# Patient Record
Sex: MALE | Race: WHITE | NOT HISPANIC OR LATINO | Employment: FULL TIME | URBAN - METROPOLITAN AREA
[De-identification: names, ages, dates, MRNs, and addresses within clinical notes are randomized per-mention and may not be internally consistent; named-entity substitution may affect disease eponyms.]

---

## 2017-02-10 ENCOUNTER — OFFICE VISIT (OUTPATIENT)
Dept: URGENT CARE | Facility: URGENT CARE | Age: 37
End: 2017-02-10
Payer: COMMERCIAL

## 2017-02-10 ENCOUNTER — RADIANT APPOINTMENT (OUTPATIENT)
Dept: GENERAL RADIOLOGY | Facility: CLINIC | Age: 37
End: 2017-02-10
Attending: NURSE PRACTITIONER
Payer: COMMERCIAL

## 2017-02-10 VITALS
HEART RATE: 99 BPM | DIASTOLIC BLOOD PRESSURE: 76 MMHG | OXYGEN SATURATION: 98 % | RESPIRATION RATE: 22 BRPM | SYSTOLIC BLOOD PRESSURE: 144 MMHG | TEMPERATURE: 100.4 F

## 2017-02-10 DIAGNOSIS — R50.9 FEVER, UNSPECIFIED: ICD-10-CM

## 2017-02-10 DIAGNOSIS — J01.90 ACUTE SINUSITIS WITH COEXISTING CONDITION REQUIRING PROPHYLACTIC TREATMENT: Primary | ICD-10-CM

## 2017-02-10 DIAGNOSIS — J20.9 ACUTE BRONCHITIS WITH COEXISTING CONDITION REQUIRING PROPHYLACTIC TREATMENT: ICD-10-CM

## 2017-02-10 LAB
FLUAV+FLUBV AG SPEC QL: NORMAL
FLUAV+FLUBV AG SPEC QL: NORMAL
SPECIMEN SOURCE: NORMAL

## 2017-02-10 PROCEDURE — 87804 INFLUENZA ASSAY W/OPTIC: CPT | Performed by: NURSE PRACTITIONER

## 2017-02-10 PROCEDURE — 99214 OFFICE O/P EST MOD 30 MIN: CPT | Performed by: NURSE PRACTITIONER

## 2017-02-10 PROCEDURE — 71020 XR CHEST 2 VW: CPT

## 2017-02-10 RX ORDER — ALBUTEROL SULFATE 90 UG/1
2 AEROSOL, METERED RESPIRATORY (INHALATION) EVERY 6 HOURS PRN
Qty: 1 INHALER | Refills: 0 | Status: SHIPPED | OUTPATIENT
Start: 2017-02-10 | End: 2017-03-31

## 2017-02-10 RX ORDER — FLUTICASONE PROPIONATE 50 MCG
1-2 SPRAY, SUSPENSION (ML) NASAL DAILY
Qty: 16 G | Refills: 0 | Status: SHIPPED | OUTPATIENT
Start: 2017-02-10 | End: 2017-03-31

## 2017-02-10 RX ORDER — PREDNISONE 20 MG/1
20 TABLET ORAL 2 TIMES DAILY
Qty: 10 TABLET | Refills: 0 | Status: SHIPPED | OUTPATIENT
Start: 2017-02-10 | End: 2017-03-31

## 2017-02-10 NOTE — MR AVS SNAPSHOT
After Visit Summary   2/10/2017    Ashish Blas    MRN: [id number]           Patient Information     Date Of Birth          1980        Visit Information        Provider Department      2/10/2017 5:50 PM Michelle Lyn APRN CNP [facility]        Today's Diagnoses     Fever, unspecified    -  1     Acute sinusitis with coexisting condition requiring prophylactic treatment         Acute bronchitis with coexisting condition requiring prophylactic treatment           Care Instructions    No Antibiotic are warranted at this  time.  It is important if your symptoms worsen or not improved in a total of 7-10 days from the onset of symptoms it is important to return and be re-evaluated.    Symptom Relief: Afdrin do not use greater then 3 days  Intranasal corticosteroid   Flonase has  been prescribed.     Tylenol or Ibuprofen if able to take for fevers and discomfort.  Do not exceed 4 grams of Tylenol in a 24 hour period.  Take Ibuprofen with food.      Follow up in 3-5 days if not improving or return sooner if worsening or fail to improve as anticipated.  Follow-up with your primary care provider next week and as needed.    Indications for emergent return to emergency department discussed with patient, who verbalized good understanding and agreement.  Patient understands the limitations of today's evaluation.         Bronchitis, Antibiotic Treatment (Adult)    Bronchitis is an infection of the air passages (bronchial tubes) in your lungs. It often occurs when you have a cold. This illness is contagious during the first few days and is spread through the air by coughing and sneezing, or by direct contact (touching the sick person and then touching your own eyes, nose, or mouth).  Symptoms of bronchitis include cough with mucus (phlegm) and low-grade fever. Bronchitis usually lasts 7 to 14 days. Mild cases can be treated with simple home remedies. More severe infection is  treated with an antibiotic.  Home care  Follow these guidelines when caring for yourself at home:    If your symptoms are severe, rest at home for the first 2 to 3 days. When you go back to your usual activities, don't let yourself get too tired.    Do not smoke. Also avoid being exposed to secondhand smoke.    You may use over-the-counter medicines to control fever or pain, unless another medicine was prescribed. (Note: If you have chronic liver or kidney disease or have ever had a stomach ulcer or gastrointestinal bleeding, talk with your healthcare provider before using these medicines. Also talk to your provider if you are taking medicine to prevent blood clots.) Aspirin should never be given to anyone younger than 18 years of age who is ill with a viral infection or fever. It may cause severe liver or brain damage.    Your appetite may be poor, so a light diet is fine. Avoid dehydration by drinking 6 to 8 glasses of fluids per day (such as water, soft drinks, sports drinks, juices, tea, or soup). Extra fluids will help loosen secretions in the nose and lungs.    Over-the-counter cough, cold, and sore-throat medicines will not shorten the length of the illness, but they may be helpful to reduce symptoms. (Note: Do not use decongestants if you have high blood pressure.)    Finish all antibiotic medicine. Do this even if you are feeling better after only a few days.  Follow-up care  Follow up with your healthcare provider, or as advised. If you had an X-ray or ECG (electrocardiogram), a specialist will review it. You will be notified of any new findings that may affect your care.  Note: If you are age 65 or older, or if you have a chronic lung disease or condition that affects your immune system, or you smoke, talk to your healthcare provider about having pneumococcal vaccinations and a yearly influenza vaccination (flu shot).  When to seek medical advice  Call your healthcare provider right away if any of these  occur:    Fever of 100.4 F (38 C) or higher    Coughing up increased amounts of colored sputum    Weakness, drowsiness, headache, facial pain, ear pain, or a stiff neck   Call 911, or get immediate medical care  Contact emergency services right away if any of these occur.    Coughing up blood    Worsening weakness, drowsiness, headache, or stiff neck    Trouble breathing, wheezing, or pain with breathing    3488-5354 [employer]. [address] [place] [address]. All rights reserved. This information is not intended as a substitute for professional medical care. Always follow your healthcare professional's instructions.        Sinusitis (Antibiotic Treatment)    The sinuses are air-filled spaces within the bones of the face. They connect to the inside of the nose. Sinusitis is an inflammation of the tissue lining the sinus cavity. Sinus inflammation can occur during a cold. It can also be due to allergies to pollens and other particles in the air. Sinusitis can cause symptoms of sinus congestion and fullness. A sinus infection causes fever, headache and facial pain. There is often green or yellow drainage from the nose or into the back of the throat (post-nasal drip). You have been given antibiotics to treat this condition.  Home care:    Take the full course of antibiotics as instructed. Do not stop taking them, even if you feel better.    Drink plenty of water, hot tea, and other liquids. This may help thin mucus. It also may promote sinus drainage.    Heat may help soothe painful areas of the face. Use a towel soaked in hot water. Or,  the shower and direct the hot spray onto your face. Using a vaporizer along with a menthol rub at night may also help.     An expectorant containing guaifenesin may help thin the mucus and promote drainage from the sinuses.    Over-the-counter decongestants may be used unless a similar medicine was prescribed. Nasal sprays work the fastest. Use one  that contains phenylephrine or oxymetazoline. First blow the nose gently. Then use the spray. Do not use these medicines more often than directed on the label or symptoms may get worse. You may also use tablets containing pseudoephedrine. Avoid products that combine ingredients, because side effects may be increased. Read labels. You can also ask the pharmacist for help. (NOTE: Persons with high blood pressure should not use decongestants. They can raise blood pressure.)    Over-the-counter antihistamines may help if allergies contributed to your sinusitis.      Do not use nasal rinses or irrigation during an acute sinus infection, unless told to by your health care provider. Rinsing may spread the infection to other sinuses.    Use acetaminophen or ibuprofen to control pain, unless another pain medicine was prescribed. (If you have chronic liver or kidney disease or ever had a stomach ulcer, talk with your doctor before using these medicines. Aspirin should never be used in anyone under 18 years of age who is ill with a fever. It may cause severe liver damage.)    Don't smoke. This can worsen symptoms.  Follow-up care  Follow up with your healthcare provider or our staff if you are not improving within the next week.  When to seek medical advice  Call your healthcare provider if any of these occur:    Facial pain or headache becoming more severe    Stiff neck    Unusual drowsiness or confusion    Swelling of the forehead or eyelids    Vision problems, including blurred or double vision    Fever of 100.4 F (38 C) or higher, or as directed by your healthcare provider    Seizure    Breathing problems    Symptoms not resolving within 10 days    4763-8131 [employer]. [address] [place] [address], [place] [address]. All rights reserved. This information is not intended as a substitute for professional medical care. Always follow your healthcare professional's instructions.        Influenza  Influenza ( the flu )  is an infection that affects your respiratory tract (the mouth, nose, and lungs, and the passages between them). Unlike a cold, the flu can make you very ill. And it can lead to pneumonia, a serious lung infection. For some people, especially older adults, young children, and people with certain chronic conditions, the flu can have serious complications and even be fatal.  What Are the Risk Factors for the Flu?     Viruses that cause influenza spread through the air in droplets when someone who has the flu coughs, sneezes, laughs, or talks.   Anyone can get the flu. But you re more likely to become infected if you:    Have a weakened immune system.    Work in a health care setting where you may be exposed to flu germs.    Live or work with someone who has the flu.    Haven t received an annual flu shot.  How Does the Flu Spread?  The flu is caused by viruses. The viruses spread through the air in droplets when someone who has the flu coughs, sneezes, laughs, or talks. You can become infected when you inhale these viruses directly. You can also become infected when you touch a surface on which the droplets have landed and then transfer the germs to your eyes, nose, or mouth. Touching used tissues, or sharing utensils, drinking glasses, or a toothbrush with an infected person can expose you to flu viruses, too.  What Are the Symptoms of the Flu?  Flu symptoms tend to come on quickly and may last a few days to a few weeks. They include:    Fever usually higher than 101 F  (38.3 C) and chills    Sore throat and headache    Dry cough    Runny nose    Tiredness and weakness    Muscle aches  Factors That Can Make Flu Worse  For some people, the flu can be very serious. The risk of complications is greater for:    Children under age 5.    Adults 65 years of age and older.    People with a chronic illness, such as diabetes or heart, kidney, or lung disease.    People who live in a nursing home or long-term care facility.    How Is the Flu Treated?  Influenza usually improves after 7 days or so. In some cases, your health care provider may prescribe an antiviral medication. This may help you get well sooner. For the medication to help, you need to take it as soon as possible (ideally within 48 hours) after your symptoms start. If you develop pneumonia or other serious illness, hospital care may be needed.  Easing Flu Symptoms    Drink lots of fluids such as water, juice, and warm soup. A good rule is to drink enough so that you urinate your normal amount.    Get plenty of rest.    Ask your health care provider what to take for fever and pain.    Call your provider if your fever rises over 101 F (38.3 C) or you become dizzy, lightheaded, or short of breath.  Taking Steps to Protect Others    Wash your hands often, especially after coughing or sneezing. Or, clean your hands with an alcohol-based hand  containing at least 60 percent alcohol.    Cough or sneeze into a tissue. Then throw the tissue away and wash your hands. If you don t have a tissue, cough and sneeze into the crook of your elbow.    Stay home until at least 24 hours after you no longer have a fever or chills. Be sure the fever isn t being hidden by fever-reducing medication.    Don t share food, utensils, drinking glasses, or a toothbrush with others.    Ask your health care provider if others in your household should receive antiviral medication to help them avoid infection.  How Can the Flu Be Prevented?    One of the best ways to avoid the flu is to get a flu vaccination each year. Viruses that cause the flu change from year to year. For that reason, doctors recommend getting the flu vaccine each year, as soon as it's available in your area. The vaccine may be given as a shot or as a nasal spray. Your health care provider can tell you which vaccine is right for you.    Wash your hands often. Frequent handwashing is a proven way to help prevent  infection.    Carry an alcohol-based hand gel containing at least 60 percent alcohol. Use it when you don t have access to soap and water. Then wash your hands as soon as you can.    Avoid touching your eyes, nose, and mouth.    At home and work, clean phones, computer keyboards, and toys often with disinfectant wipes.    If possible, avoid close contact with others who have the flu or symptoms of the flu.  Handwashing Tips  Handwashing is one of the best ways to prevent many common infections. If you re caring for or visiting someone with the flu, wash your hands each time you enter and leave the room. Follow these steps:    Use warm water and plenty of soap. Rub your hands together well.    Clean the whole hand, under your nails, between your fingers, and up the wrists.    Wash for at least 15 seconds.    Rinse, letting the water run down your fingers, not up your wrists.    Dry your hands well. Use a paper towel to turn off the faucet and open the door.  Using Alcohol-Based Hand   Alcohol-based hand  are also a good choice. Use them when you don t have access to soap and water. Follow these steps:    Squeeze about a tablespoon of gel into the palm of one hand.    Rub your hands together briskly, cleaning the backs of your hands, the palms, between your fingers, and up the wrists.    Rub until the gel is gone and your hands are completely dry.  Preventing Influenza in Healthcare Settings  The flu is a special concern for people in hospitals and long-term care facilities. To help prevent the spread of flu, many hospitals and nursing homes take these steps:    Health care providers wash their hands or use an alcohol-based hand  before and after treating each patient.    People with the flu have private rooms and bathrooms or share a room with someone with the same infection.    High-risk patients who don t have the flu are encouraged to get the flu and pneumonia vaccines.    All health care  workers are encouraged or required to get flu shots.        4104-3300 [employer]. [address] [place] [address], [place] [address]. All rights reserved. This information is not intended as a substitute for professional medical care. Always follow your healthcare professional's instructions.              Follow-ups after your visit        Who to contact     If you have questions or need follow up information about today's clinic visit or your schedule please contact [facility] directly at [phone].  Normal or non-critical lab and imaging results will be communicated to you by [employer]UM[employer], letter or phone within 4 business days after the clinic has received the results. If you do not hear from us within 7 days, please contact the clinic through [employer] or phone. If you have a critical or abnormal lab result, we will notify you by phone as soon as possible.  Submit refill requests through [employer] or call your pharmacy and they will forward the refill request to us. Please allow 3 business days for your refill to be completed.          Additional Information About Your Visit        ZIM[employer]UM[employer] Information     [employer] gives you secure access to your electronic health record. If you see a primary care provider, you can also send messages to your care team and make appointments. If you have questions, please call your primary care clinic.  If you do not have a primary care provider, please call [phone] and they will assist you.        Care EveryWhere ID     This is your Care EveryWhere ID. This could be used by other organizations to access your [place] medical records  [id number]        Your Vitals Were     Pulse Temperature Respirations Pulse Oximetry          99 100.4  F (38  C) (Tympanic) 22 98%         Blood Pressure from Last 3 Encounters:   02/10/17 144/76   11/08/16 128/74   08/23/16 130/85    Weight from Last 3 Encounters:   11/08/16 214 lb (97.07 kg)   08/23/16 204 lb  (92.534 kg)   06/03/16 204 lb 9.6 oz (92.806 kg)              We Performed the Following     Influenza A/B antigen          Today's Medication Changes          These changes are accurate as of: 2/10/17  6:50 PM.  If you have any questions, ask your nurse or doctor.               Start taking these medicines.        Dose/Directions    albuterol 108 (90 BASE) MCG/ACT Inhaler   Commonly known as:  PROAIR HFA/PROVENTIL HFA/VENTOLIN HFA   Used for:  Acute bronchitis with coexisting condition requiring prophylactic treatment   Started by:  Michelle Lyn APRN CNP        Dose:  2 puff   Inhale 2 puffs into the lungs every 6 hours as needed for shortness of breath / dyspnea or wheezing   Quantity:  1 Inhaler   Refills:  0       amoxicillin-clavulanate 875-125 MG per tablet   Commonly known as:  AUGMENTIN   Used for:  Acute sinusitis with coexisting condition requiring prophylactic treatment, Acute bronchitis with coexisting condition requiring prophylactic treatment   Started by:  Michelle Lyn APRN CNP        Dose:  1 tablet   Take 1 tablet by mouth 2 times daily   Quantity:  20 tablet   Refills:  0       predniSONE 20 MG tablet   Commonly known as:  DELTASONE   Used for:  Acute bronchitis with coexisting condition requiring prophylactic treatment   Started by:  Michelle Lyn APRN CNP        Dose:  20 mg   Take 1 tablet (20 mg) by mouth 2 times daily   Quantity:  10 tablet   Refills:  0         These medicines have changed or have updated prescriptions.        Dose/Directions    * fluticasone 50 MCG/ACT spray   Commonly known as:  FLONASE   This may have changed:  Another medication with the same name was added. Make sure you understand how and when to take each.   Used for:  Seasonal allergic rhinitis, unspecified allergic rhinitis trigger   Changed by:  Arlin Perez APRN CNP        Dose:  1-2 spray   Spray 1-2 sprays into both nostrils daily   Quantity:  1 Bottle   Refills:  11       * fluticasone 50  MCG/ACT spray   Commonly known as:  FLONASE   This may have changed:  You were already taking a medication with the same name, and this prescription was added. Make sure you understand how and when to take each.   Used for:  Acute sinusitis with coexisting condition requiring prophylactic treatment   Changed by:  Michelle Lyn APRN CNP        Dose:  1-2 spray   Spray 1-2 sprays into both nostrils daily   Quantity:  16 g   Refills:  0       * Notice:  This list has 2 medication(s) that are the same as other medications prescribed for you. Read the directions carefully, and ask your doctor or other care provider to review them with you.         Where to get your medicines      These medications were sent to [id number] - [place], [place] - [address]  [address] [address]    Hours:  Closed 10-16-08 business to [facility] Phone:  [phone]    - albuterol 108 (90 BASE) MCG/ACT Inhaler  - amoxicillin-clavulanate 875-125 MG per tablet  - fluticasone 50 MCG/ACT spray  - predniSONE 20 MG tablet             Primary Care Provider Office Phone # Fax #    Sauravmiguel Jennifer Pack [phone]       [address] [address]        Thank you!     Thank you for choosing [facility]  for your care. Our goal is always to provide you with excellent care. Hearing back from our patients is one way we can continue to improve our services. Please take a few minutes to complete the written survey that you may receive in the mail after your visit with us. Thank you!             Your Updated Medication List - Protect others around you: Learn how to safely use, store and throw away your medicines at www.disposemymeds.org.          This list is accurate as of: 2/10/17  6:50 PM.  Always use your most recent med list.                   Brand Name Dispense Instructions for use    albuterol 108 (90 BASE)  MCG/ACT Inhaler    PROAIR HFA/PROVENTIL HFA/VENTOLIN HFA    1 Inhaler    Inhale 2 puffs into the lungs every 6 hours as needed for shortness of breath / dyspnea or wheezing       ALPRAZolam 0.25 MG tablet    XANAX    20 tablet    Take 1 tablet (0.25 mg) by mouth 3 times daily as needed for anxiety       amoxicillin-clavulanate 875-125 MG per tablet    AUGMENTIN    20 tablet    Take 1 tablet by mouth 2 times daily       citalopram 40 MG tablet    celeXA    90 tablet    Take 1 tablet (40 mg) by mouth daily To start 1/2 tab daily x 6 days then full tab daily.       * fluticasone 50 MCG/ACT spray    FLONASE    1 Bottle    Spray 1-2 sprays into both nostrils daily       * fluticasone 50 MCG/ACT spray    FLONASE    16 g    Spray 1-2 sprays into both nostrils daily       Multi-vitamin Tabs tablet   Generic drug:  multivitamin, therapeutic with minerals      1 TABLET ORALLY DAILY       predniSONE 20 MG tablet    DELTASONE    10 tablet    Take 1 tablet (20 mg) by mouth 2 times daily       TYLENOL PO          VITAMIN B COMPLEX PO      1 TABLET ORALLY DAILY       ZOLOFT PO      Take by mouth daily       * Notice:  This list has 2 medication(s) that are the same as other medications prescribed for you. Read the directions carefully, and ask your doctor or other care provider to review them with you.

## 2017-02-11 NOTE — PROGRESS NOTES
SUBJECTIVE:  Ashish Blas is a 36 year old male who presents to the clinic today with a chief complaint of cough  for 2 day(s).  His cough is described as nonproductive.    The patient's symptoms are moderate and worsening.  Associated symptoms include congestion and fever. The patient's symptoms are exacerbated by no particular triggers  Patient has been using nothing  to improve symptoms.    History reviewed. No pertinent past medical history.    Social History   Substance Use Topics     Smoking status: Former Smoker -- 10 years     Types: Cigarettes     Start date: 07/19/2016     Smokeless tobacco: Never Used      Comment: Quit date is 5/30/2016     Alcohol Use: Yes      Comment: occ         ROS  CONSTITUTIONAL:POSITIVE  for fever   INTEGUMENTARY/SKIN: NEGATIVE for worrisome rashes, moles or lesions  EYES: NEGATIVE for vision changes or irritation  ENT/MOUTH: POSITIVE for sinus pressure  RESP:POSITIVE for cough-non productive  CV: NEGATIVE for chest pain, palpitations or peripheral edema  MUSCULOSKELETAL: NEGATIVE for significant arthralgias or myalgia  NEURO: NEGATIVE for weakness, dizziness or paresthesias    OBJECTIVE:  /76 mmHg  Pulse 99  Temp(Src) 100.4  F (38  C) (Tympanic)  Resp 22  SpO2 98%  GENERAL APPEARANCE: healthy, alert and no distress  EYES: EOMI,  PERRL, conjunctiva clear  HENT: ear canals and TM's normal.  mouth without ulcers, erythema or lesions  HENT: nasal turbinates erythematous, swollen  NECK: supple, nontender, no lymphadenopathy  RESP: lungs clear to auscultation - no rales, rhonchi or wheezes  CV: regular rates and rhythm, normal S1 S2, no murmur noted  ABDOMEN:  soft, nontender, no HSM or masses and bowel sounds normal  NEURO: Normal strength and tone, sensory exam grossly normal,  normal speech and mentation  SKIN: no suspicious lesions or rashes    X-RAY:  CHEST TWO VIEWS  2/10/2017 6:49 PM       HISTORY: Fever.     COMPARISON:  4/26/2016                                                                       IMPRESSION: Normal. No change.  ASSESSMENT:      ICD-10-CM    1. Acute sinusitis with coexisting condition requiring prophylactic treatment J01.90 fluticasone (FLONASE) 50 MCG/ACT spray     amoxicillin-clavulanate (AUGMENTIN) 875-125 MG per tablet   2. Acute bronchitis with coexisting condition requiring prophylactic treatment J20.9 predniSONE (DELTASONE) 20 MG tablet     albuterol (PROAIR HFA/PROVENTIL HFA/VENTOLIN HFA) 108 (90 BASE) MCG/ACT Inhaler     amoxicillin-clavulanate (AUGMENTIN) 875-125 MG per tablet   3. Fever, unspecified R50.9 Influenza A/B antigen     XR Chest 2 Views         PLAN:  Patient Instructions     No Antibiotic are warranted at this  time.  It is important if your symptoms worsen or not improved in a total of 7-10 days from the onset of symptoms it is important to return and be re-evaluated.    Symptom Relief: Afdrin do not use greater then 3 days  Intranasal corticosteroid   Flonase has  been prescribed.     Tylenol or Ibuprofen if able to take for fevers and discomfort.  Do not exceed 4 grams of Tylenol in a 24 hour period.  Take Ibuprofen with food.      Follow up in 3-5 days if not improving or return sooner if worsening or fail to improve as anticipated.  Follow-up with your primary care provider next week and as needed.    Indications for emergent return to emergency department discussed with patient, who verbalized good understanding and agreement.  Patient understands the limitations of today's evaluation.         Bronchitis, Antibiotic Treatment (Adult)    Bronchitis is an infection of the air passages (bronchial tubes) in your lungs. It often occurs when you have a cold. This illness is contagious during the first few days and is spread through the air by coughing and sneezing, or by direct contact (touching the sick person and then touching your own eyes, nose, or mouth).  Symptoms of bronchitis  include cough with mucus (phlegm) and low-grade fever. Bronchitis usually lasts 7 to 14 days. Mild cases can be treated with simple home remedies. More severe infection is treated with an antibiotic.  Home care  Follow these guidelines when caring for yourself at home:    If your symptoms are severe, rest at home for the first 2 to 3 days. When you go back to your usual activities, don't let yourself get too tired.    Do not smoke. Also avoid being exposed to secondhand smoke.    You may use over-the-counter medicines to control fever or pain, unless another medicine was prescribed. (Note: If you have chronic liver or kidney disease or have ever had a stomach ulcer or gastrointestinal bleeding, talk with your healthcare provider before using these medicines. Also talk to your provider if you are taking medicine to prevent blood clots.) Aspirin should never be given to anyone younger than 18 years of age who is ill with a viral infection or fever. It may cause severe liver or brain damage.    Your appetite may be poor, so a light diet is fine. Avoid dehydration by drinking 6 to 8 glasses of fluids per day (such as water, soft drinks, sports drinks, juices, tea, or soup). Extra fluids will help loosen secretions in the nose and lungs.    Over-the-counter cough, cold, and sore-throat medicines will not shorten the length of the illness, but they may be helpful to reduce symptoms. (Note: Do not use decongestants if you have high blood pressure.)    Finish all antibiotic medicine. Do this even if you are feeling better after only a few days.  Follow-up care  Follow up with your healthcare provider, or as advised. If you had an X-ray or ECG (electrocardiogram), a specialist will review it. You will be notified of any new findings that may affect your care.  Note: If you are age 65 or older, or if you have a chronic lung disease or condition that affects your immune system, or you smoke, talk to your healthcare provider  about having pneumococcal vaccinations and a yearly influenza vaccination (flu shot).  When to seek medical advice  Call your healthcare provider right away if any of these occur:    Fever of 100.4 F (38 C) or higher    Coughing up increased amounts of colored sputum    Weakness, drowsiness, headache, facial pain, ear pain, or a stiff neck   Call 911, or get immediate medical care  Contact emergency services right away if any of these occur.    Coughing up blood    Worsening weakness, drowsiness, headache, or stiff neck    Trouble breathing, wheezing, or pain with breathing    5863-4417 [employer]. [address] [place] [address]. All rights reserved. This information is not intended as a substitute for professional medical care. Always follow your healthcare professional's instructions.        Sinusitis (Antibiotic Treatment)    The sinuses are air-filled spaces within the bones of the face. They connect to the inside of the nose. Sinusitis is an inflammation of the tissue lining the sinus cavity. Sinus inflammation can occur during a cold. It can also be due to allergies to pollens and other particles in the air. Sinusitis can cause symptoms of sinus congestion and fullness. A sinus infection causes fever, headache and facial pain. There is often green or yellow drainage from the nose or into the back of the throat (post-nasal drip). You have been given antibiotics to treat this condition.  Home care:    Take the full course of antibiotics as instructed. Do not stop taking them, even if you feel better.    Drink plenty of water, hot tea, and other liquids. This may help thin mucus. It also may promote sinus drainage.    Heat may help soothe painful areas of the face. Use a towel soaked in hot water. Or,  the shower and direct the hot spray onto your face. Using a vaporizer along with a menthol rub at night may also help.     An expectorant containing guaifenesin may help thin the  mucus and promote drainage from the sinuses.    Over-the-counter decongestants may be used unless a similar medicine was prescribed. Nasal sprays work the fastest. Use one that contains phenylephrine or oxymetazoline. First blow the nose gently. Then use the spray. Do not use these medicines more often than directed on the label or symptoms may get worse. You may also use tablets containing pseudoephedrine. Avoid products that combine ingredients, because side effects may be increased. Read labels. You can also ask the pharmacist for help. (NOTE: Persons with high blood pressure should not use decongestants. They can raise blood pressure.)    Over-the-counter antihistamines may help if allergies contributed to your sinusitis.      Do not use nasal rinses or irrigation during an acute sinus infection, unless told to by your health care provider. Rinsing may spread the infection to other sinuses.    Use acetaminophen or ibuprofen to control pain, unless another pain medicine was prescribed. (If you have chronic liver or kidney disease or ever had a stomach ulcer, talk with your doctor before using these medicines. Aspirin should never be used in anyone under 18 years of age who is ill with a fever. It may cause severe liver damage.)    Don't smoke. This can worsen symptoms.  Follow-up care  Follow up with your healthcare provider or our staff if you are not improving within the next week.  When to seek medical advice  Call your healthcare provider if any of these occur:    Facial pain or headache becoming more severe    Stiff neck    Unusual drowsiness or confusion    Swelling of the forehead or eyelids    Vision problems, including blurred or double vision    Fever of 100.4 F (38 C) or higher, or as directed by your healthcare provider    Seizure    Breathing problems    Symptoms not resolving within 10 days    [id number] [employer]. [address] [place] [address], [place] [address]. All rights reserved. This  information is not intended as a substitute for professional medical care. Always follow your healthcare professional's instructions.        Influenza  Influenza ( the flu ) is an infection that affects your respiratory tract (the mouth, nose, and lungs, and the passages between them). Unlike a cold, the flu can make you very ill. And it can lead to pneumonia, a serious lung infection. For some people, especially older adults, young children, and people with certain chronic conditions, the flu can have serious complications and even be fatal.  What Are the Risk Factors for the Flu?     Viruses that cause influenza spread through the air in droplets when someone who has the flu coughs, sneezes, laughs, or talks.   Anyone can get the flu. But you re more likely to become infected if you:    Have a weakened immune system.    Work in a health care setting where you may be exposed to flu germs.    Live or work with someone who has the flu.    Haven t received an annual flu shot.  How Does the Flu Spread?  The flu is caused by viruses. The viruses spread through the air in droplets when someone who has the flu coughs, sneezes, laughs, or talks. You can become infected when you inhale these viruses directly. You can also become infected when you touch a surface on which the droplets have landed and then transfer the germs to your eyes, nose, or mouth. Touching used tissues, or sharing utensils, drinking glasses, or a toothbrush with an infected person can expose you to flu viruses, too.  What Are the Symptoms of the Flu?  Flu symptoms tend to come on quickly and may last a few days to a few weeks. They include:    Fever usually higher than 101 F  (38.3 C) and chills    Sore throat and headache    Dry cough    Runny nose    Tiredness and weakness    Muscle aches  Factors That Can Make Flu Worse  For some people, the flu can be very serious. The risk of complications is greater for:    Children under age 5.    Adults 65  years of age and older.    People with a chronic illness, such as diabetes or heart, kidney, or lung disease.    People who live in a nursing home or long-term care facility.   How Is the Flu Treated?  Influenza usually improves after 7 days or so. In some cases, your health care provider may prescribe an antiviral medication. This may help you get well sooner. For the medication to help, you need to take it as soon as possible (ideally within 48 hours) after your symptoms start. If you develop pneumonia or other serious illness, hospital care may be needed.  Easing Flu Symptoms    Drink lots of fluids such as water, juice, and warm soup. A good rule is to drink enough so that you urinate your normal amount.    Get plenty of rest.    Ask your health care provider what to take for fever and pain.    Call your provider if your fever rises over 101 F (38.3 C) or you become dizzy, lightheaded, or short of breath.  Taking Steps to Protect Others    Wash your hands often, especially after coughing or sneezing. Or, clean your hands with an alcohol-based hand  containing at least 60 percent alcohol.    Cough or sneeze into a tissue. Then throw the tissue away and wash your hands. If you don t have a tissue, cough and sneeze into the crook of your elbow.    Stay home until at least 24 hours after you no longer have a fever or chills. Be sure the fever isn t being hidden by fever-reducing medication.    Don t share food, utensils, drinking glasses, or a toothbrush with others.    Ask your health care provider if others in your household should receive antiviral medication to help them avoid infection.  How Can the Flu Be Prevented?    One of the best ways to avoid the flu is to get a flu vaccination each year. Viruses that cause the flu change from year to year. For that reason, doctors recommend getting the flu vaccine each year, as soon as it's available in your area. The vaccine may be given as a shot or as a nasal  spray. Your health care provider can tell you which vaccine is right for you.    Wash your hands often. Frequent handwashing is a proven way to help prevent infection.    Carry an alcohol-based hand gel containing at least 60 percent alcohol. Use it when you don t have access to soap and water. Then wash your hands as soon as you can.    Avoid touching your eyes, nose, and mouth.    At home and work, clean phones, computer keyboards, and toys often with disinfectant wipes.    If possible, avoid close contact with others who have the flu or symptoms of the flu.  Handwashing Tips  Handwashing is one of the best ways to prevent many common infections. If you re caring for or visiting someone with the flu, wash your hands each time you enter and leave the room. Follow these steps:    Use warm water and plenty of soap. Rub your hands together well.    Clean the whole hand, under your nails, between your fingers, and up the wrists.    Wash for at least 15 seconds.    Rinse, letting the water run down your fingers, not up your wrists.    Dry your hands well. Use a paper towel to turn off the faucet and open the door.  Using Alcohol-Based Hand   Alcohol-based hand  are also a good choice. Use them when you don t have access to soap and water. Follow these steps:    Squeeze about a tablespoon of gel into the palm of one hand.    Rub your hands together briskly, cleaning the backs of your hands, the palms, between your fingers, and up the wrists.    Rub until the gel is gone and your hands are completely dry.  Preventing Influenza in Healthcare Settings  The flu is a special concern for people in hospitals and long-term care facilities. To help prevent the spread of flu, many hospitals and nursing homes take these steps:    Health care providers wash their hands or use an alcohol-based hand  before and after treating each patient.    People with the flu have private rooms and bathrooms or share a room  with someone with the same infection.    High-risk patients who don t have the flu are encouraged to get the flu and pneumonia vaccines.    All health care workers are encouraged or required to get flu shots.        2930-1244 [employer]. [address] [place] [address], [place] [address]. All rights reserved. This information is not intended as a substitute for professional medical care. Always follow your healthcare professional's instructions.            MARTHA Almaraz CNP

## 2017-02-11 NOTE — PATIENT INSTRUCTIONS
No Antibiotic are warranted at this  time.  It is important if your symptoms worsen or not improved in a total of 7-10 days from the onset of symptoms it is important to return and be re-evaluated.    Symptom Relief: Afdrin do not use greater then 3 days  Intranasal corticosteroid   Flonase has  been prescribed.     Tylenol or Ibuprofen if able to take for fevers and discomfort.  Do not exceed 4 grams of Tylenol in a 24 hour period.  Take Ibuprofen with food.      Follow up in 3-5 days if not improving or return sooner if worsening or fail to improve as anticipated.  Follow-up with your primary care provider next week and as needed.    Indications for emergent return to emergency department discussed with patient, who verbalized good understanding and agreement.  Patient understands the limitations of today's evaluation.         Bronchitis, Antibiotic Treatment (Adult)    Bronchitis is an infection of the air passages (bronchial tubes) in your lungs. It often occurs when you have a cold. This illness is contagious during the first few days and is spread through the air by coughing and sneezing, or by direct contact (touching the sick person and then touching your own eyes, nose, or mouth).  Symptoms of bronchitis include cough with mucus (phlegm) and low-grade fever. Bronchitis usually lasts 7 to 14 days. Mild cases can be treated with simple home remedies. More severe infection is treated with an antibiotic.  Home care  Follow these guidelines when caring for yourself at home:    If your symptoms are severe, rest at home for the first 2 to 3 days. When you go back to your usual activities, don't let yourself get too tired.    Do not smoke. Also avoid being exposed to secondhand smoke.    You may use over-the-counter medicines to control fever or pain, unless another medicine was prescribed. (Note: If you have chronic liver or kidney disease or have ever had a stomach ulcer or gastrointestinal bleeding, talk with  your healthcare provider before using these medicines. Also talk to your provider if you are taking medicine to prevent blood clots.) Aspirin should never be given to anyone younger than 18 years of age who is ill with a viral infection or fever. It may cause severe liver or brain damage.    Your appetite may be poor, so a light diet is fine. Avoid dehydration by drinking 6 to 8 glasses of fluids per day (such as water, soft drinks, sports drinks, juices, tea, or soup). Extra fluids will help loosen secretions in the nose and lungs.    Over-the-counter cough, cold, and sore-throat medicines will not shorten the length of the illness, but they may be helpful to reduce symptoms. (Note: Do not use decongestants if you have high blood pressure.)    Finish all antibiotic medicine. Do this even if you are feeling better after only a few days.  Follow-up care  Follow up with your healthcare provider, or as advised. If you had an X-ray or ECG (electrocardiogram), a specialist will review it. You will be notified of any new findings that may affect your care.  Note: If you are age 65 or older, or if you have a chronic lung disease or condition that affects your immune system, or you smoke, talk to your healthcare provider about having pneumococcal vaccinations and a yearly influenza vaccination (flu shot).  When to seek medical advice  Call your healthcare provider right away if any of these occur:    Fever of 100.4 F (38 C) or higher    Coughing up increased amounts of colored sputum    Weakness, drowsiness, headache, facial pain, ear pain, or a stiff neck   Call 911, or get immediate medical care  Contact emergency services right away if any of these occur.    Coughing up blood    Worsening weakness, drowsiness, headache, or stiff neck    Trouble breathing, wheezing, or pain with breathing    9262-1435 [employer]. [address] [place] [address], [place] [address]. All rights reserved. This information is not  intended as a substitute for professional medical care. Always follow your healthcare professional's instructions.        Sinusitis (Antibiotic Treatment)    The sinuses are air-filled spaces within the bones of the face. They connect to the inside of the nose. Sinusitis is an inflammation of the tissue lining the sinus cavity. Sinus inflammation can occur during a cold. It can also be due to allergies to pollens and other particles in the air. Sinusitis can cause symptoms of sinus congestion and fullness. A sinus infection causes fever, headache and facial pain. There is often green or yellow drainage from the nose or into the back of the throat (post-nasal drip). You have been given antibiotics to treat this condition.  Home care:    Take the full course of antibiotics as instructed. Do not stop taking them, even if you feel better.    Drink plenty of water, hot tea, and other liquids. This may help thin mucus. It also may promote sinus drainage.    Heat may help soothe painful areas of the face. Use a towel soaked in hot water. Or,  the shower and direct the hot spray onto your face. Using a vaporizer along with a menthol rub at night may also help.     An expectorant containing guaifenesin may help thin the mucus and promote drainage from the sinuses.    Over-the-counter decongestants may be used unless a similar medicine was prescribed. Nasal sprays work the fastest. Use one that contains phenylephrine or oxymetazoline. First blow the nose gently. Then use the spray. Do not use these medicines more often than directed on the label or symptoms may get worse. You may also use tablets containing pseudoephedrine. Avoid products that combine ingredients, because side effects may be increased. Read labels. You can also ask the pharmacist for help. (NOTE: Persons with high blood pressure should not use decongestants. They can raise blood pressure.)    Over-the-counter antihistamines may help if allergies  contributed to your sinusitis.      Do not use nasal rinses or irrigation during an acute sinus infection, unless told to by your health care provider. Rinsing may spread the infection to other sinuses.    Use acetaminophen or ibuprofen to control pain, unless another pain medicine was prescribed. (If you have chronic liver or kidney disease or ever had a stomach ulcer, talk with your doctor before using these medicines. Aspirin should never be used in anyone under 18 years of age who is ill with a fever. It may cause severe liver damage.)    Don't smoke. This can worsen symptoms.  Follow-up care  Follow up with your healthcare provider or our staff if you are not improving within the next week.  When to seek medical advice  Call your healthcare provider if any of these occur:    Facial pain or headache becoming more severe    Stiff neck    Unusual drowsiness or confusion    Swelling of the forehead or eyelids    Vision problems, including blurred or double vision    Fever of 100.4 F (38 C) or higher, or as directed by your healthcare provider    Seizure    Breathing problems    Symptoms not resolving within 10 days    3992-2318 [employer]. [address] [place] [address]. All rights reserved. This information is not intended as a substitute for professional medical care. Always follow your healthcare professional's instructions.        Influenza  Influenza ( the flu ) is an infection that affects your respiratory tract (the mouth, nose, and lungs, and the passages between them). Unlike a cold, the flu can make you very ill. And it can lead to pneumonia, a serious lung infection. For some people, especially older adults, young children, and people with certain chronic conditions, the flu can have serious complications and even be fatal.  What Are the Risk Factors for the Flu?     Viruses that cause influenza spread through the air in droplets when someone who has the flu coughs, sneezes,  laughs, or talks.   Anyone can get the flu. But you re more likely to become infected if you:    Have a weakened immune system.    Work in a health care setting where you may be exposed to flu germs.    Live or work with someone who has the flu.    Haven t received an annual flu shot.  How Does the Flu Spread?  The flu is caused by viruses. The viruses spread through the air in droplets when someone who has the flu coughs, sneezes, laughs, or talks. You can become infected when you inhale these viruses directly. You can also become infected when you touch a surface on which the droplets have landed and then transfer the germs to your eyes, nose, or mouth. Touching used tissues, or sharing utensils, drinking glasses, or a toothbrush with an infected person can expose you to flu viruses, too.  What Are the Symptoms of the Flu?  Flu symptoms tend to come on quickly and may last a few days to a few weeks. They include:    Fever usually higher than 101 F  (38.3 C) and chills    Sore throat and headache    Dry cough    Runny nose    Tiredness and weakness    Muscle aches  Factors That Can Make Flu Worse  For some people, the flu can be very serious. The risk of complications is greater for:    Children under age 5.    Adults 65 years of age and older.    People with a chronic illness, such as diabetes or heart, kidney, or lung disease.    People who live in a nursing home or long-term care facility.   How Is the Flu Treated?  Influenza usually improves after 7 days or so. In some cases, your health care provider may prescribe an antiviral medication. This may help you get well sooner. For the medication to help, you need to take it as soon as possible (ideally within 48 hours) after your symptoms start. If you develop pneumonia or other serious illness, hospital care may be needed.  Easing Flu Symptoms    Drink lots of fluids such as water, juice, and warm soup. A good rule is to drink enough so that you urinate your  normal amount.    Get plenty of rest.    Ask your health care provider what to take for fever and pain.    Call your provider if your fever rises over 101 F (38.3 C) or you become dizzy, lightheaded, or short of breath.  Taking Steps to Protect Others    Wash your hands often, especially after coughing or sneezing. Or, clean your hands with an alcohol-based hand  containing at least 60 percent alcohol.    Cough or sneeze into a tissue. Then throw the tissue away and wash your hands. If you don t have a tissue, cough and sneeze into the crook of your elbow.    Stay home until at least 24 hours after you no longer have a fever or chills. Be sure the fever isn t being hidden by fever-reducing medication.    Don t share food, utensils, drinking glasses, or a toothbrush with others.    Ask your health care provider if others in your household should receive antiviral medication to help them avoid infection.  How Can the Flu Be Prevented?    One of the best ways to avoid the flu is to get a flu vaccination each year. Viruses that cause the flu change from year to year. For that reason, doctors recommend getting the flu vaccine each year, as soon as it's available in your area. The vaccine may be given as a shot or as a nasal spray. Your health care provider can tell you which vaccine is right for you.    Wash your hands often. Frequent handwashing is a proven way to help prevent infection.    Carry an alcohol-based hand gel containing at least 60 percent alcohol. Use it when you don t have access to soap and water. Then wash your hands as soon as you can.    Avoid touching your eyes, nose, and mouth.    At home and work, clean phones, computer keyboards, and toys often with disinfectant wipes.    If possible, avoid close contact with others who have the flu or symptoms of the flu.  Handwashing Tips  Handwashing is one of the best ways to prevent many common infections. If you re caring for or visiting someone with  the flu, wash your hands each time you enter and leave the room. Follow these steps:    Use warm water and plenty of soap. Rub your hands together well.    Clean the whole hand, under your nails, between your fingers, and up the wrists.    Wash for at least 15 seconds.    Rinse, letting the water run down your fingers, not up your wrists.    Dry your hands well. Use a paper towel to turn off the faucet and open the door.  Using Alcohol-Based Hand   Alcohol-based hand  are also a good choice. Use them when you don t have access to soap and water. Follow these steps:    Squeeze about a tablespoon of gel into the palm of one hand.    Rub your hands together briskly, cleaning the backs of your hands, the palms, between your fingers, and up the wrists.    Rub until the gel is gone and your hands are completely dry.  Preventing Influenza in Healthcare Settings  The flu is a special concern for people in hospitals and long-term care facilities. To help prevent the spread of flu, many hospitals and nursing homes take these steps:    Health care providers wash their hands or use an alcohol-based hand  before and after treating each patient.    People with the flu have private rooms and bathrooms or share a room with someone with the same infection.    High-risk patients who don t have the flu are encouraged to get the flu and pneumonia vaccines.    All health care workers are encouraged or required to get flu shots.        5248-4253 [employer]. [address] [place] [address], [place] [address]. All rights reserved. This information is not intended as a substitute for professional medical care. Always follow your healthcare professional's instructions.

## 2017-03-31 ENCOUNTER — OFFICE VISIT (OUTPATIENT)
Dept: FAMILY MEDICINE | Facility: CLINIC | Age: 37
End: 2017-03-31
Payer: COMMERCIAL

## 2017-03-31 VITALS
DIASTOLIC BLOOD PRESSURE: 76 MMHG | BODY MASS INDEX: 27.8 KG/M2 | SYSTOLIC BLOOD PRESSURE: 131 MMHG | HEIGHT: 73 IN | TEMPERATURE: 97.7 F | WEIGHT: 209.8 LBS | HEART RATE: 69 BPM

## 2017-03-31 DIAGNOSIS — E78.2 MIXED HYPERLIPIDEMIA: ICD-10-CM

## 2017-03-31 DIAGNOSIS — K21.9 GASTROESOPHAGEAL REFLUX DISEASE, ESOPHAGITIS PRESENCE NOT SPECIFIED: ICD-10-CM

## 2017-03-31 DIAGNOSIS — Z00.01 ENCOUNTER FOR WELL ADULT EXAM WITH ABNORMAL FINDINGS: Primary | ICD-10-CM

## 2017-03-31 DIAGNOSIS — F41.9 ANXIETY: ICD-10-CM

## 2017-03-31 LAB
ALBUMIN SERPL-MCNC: 4.5 G/DL (ref 3.4–5)
ALP SERPL-CCNC: 69 U/L (ref 40–150)
ALT SERPL W P-5'-P-CCNC: 64 U/L (ref 0–70)
ANION GAP SERPL CALCULATED.3IONS-SCNC: 7 MMOL/L (ref 3–14)
AST SERPL W P-5'-P-CCNC: 32 U/L (ref 0–45)
BASOPHILS # BLD AUTO: 0 10E9/L (ref 0–0.2)
BASOPHILS NFR BLD AUTO: 0.4 %
BILIRUB SERPL-MCNC: 1 MG/DL (ref 0.2–1.3)
BUN SERPL-MCNC: 12 MG/DL (ref 7–30)
CALCIUM SERPL-MCNC: 9.4 MG/DL (ref 8.5–10.1)
CHLORIDE SERPL-SCNC: 101 MMOL/L (ref 94–109)
CHOLEST SERPL-MCNC: 285 MG/DL
CO2 SERPL-SCNC: 30 MMOL/L (ref 20–32)
CREAT SERPL-MCNC: 0.74 MG/DL (ref 0.66–1.25)
DIFFERENTIAL METHOD BLD: NORMAL
EOSINOPHIL # BLD AUTO: 0.1 10E9/L (ref 0–0.7)
EOSINOPHIL NFR BLD AUTO: 3 %
ERYTHROCYTE [DISTWIDTH] IN BLOOD BY AUTOMATED COUNT: 12.4 % (ref 10–15)
GFR SERPL CREATININE-BSD FRML MDRD: NORMAL ML/MIN/1.7M2
GLUCOSE SERPL-MCNC: 84 MG/DL (ref 70–99)
HCT VFR BLD AUTO: 40.6 % (ref 40–53)
HDLC SERPL-MCNC: 39 MG/DL
HGB BLD-MCNC: 14.1 G/DL (ref 13.3–17.7)
LDLC SERPL CALC-MCNC: 227 MG/DL
LYMPHOCYTES # BLD AUTO: 2 10E9/L (ref 0.8–5.3)
LYMPHOCYTES NFR BLD AUTO: 42.6 %
MCH RBC QN AUTO: 30.8 PG (ref 26.5–33)
MCHC RBC AUTO-ENTMCNC: 34.7 G/DL (ref 31.5–36.5)
MCV RBC AUTO: 89 FL (ref 78–100)
MONOCYTES # BLD AUTO: 0.6 10E9/L (ref 0–1.3)
MONOCYTES NFR BLD AUTO: 13.1 %
NEUTROPHILS # BLD AUTO: 1.9 10E9/L (ref 1.6–8.3)
NEUTROPHILS NFR BLD AUTO: 40.9 %
NONHDLC SERPL-MCNC: 246 MG/DL
PLATELET # BLD AUTO: 218 10E9/L (ref 150–450)
POTASSIUM SERPL-SCNC: 3.9 MMOL/L (ref 3.4–5.3)
PROT SERPL-MCNC: 8 G/DL (ref 6.8–8.8)
RBC # BLD AUTO: 4.58 10E12/L (ref 4.4–5.9)
SODIUM SERPL-SCNC: 138 MMOL/L (ref 133–144)
TRIGL SERPL-MCNC: 93 MG/DL
TSH SERPL DL<=0.005 MIU/L-ACNC: 0.99 MU/L (ref 0.4–4)
WBC # BLD AUTO: 4.7 10E9/L (ref 4–11)

## 2017-03-31 PROCEDURE — 80061 LIPID PANEL: CPT | Performed by: FAMILY MEDICINE

## 2017-03-31 PROCEDURE — 84443 ASSAY THYROID STIM HORMONE: CPT | Performed by: FAMILY MEDICINE

## 2017-03-31 PROCEDURE — 80053 COMPREHEN METABOLIC PANEL: CPT | Performed by: FAMILY MEDICINE

## 2017-03-31 PROCEDURE — 99213 OFFICE O/P EST LOW 20 MIN: CPT | Mod: 25 | Performed by: FAMILY MEDICINE

## 2017-03-31 PROCEDURE — 85025 COMPLETE CBC W/AUTO DIFF WBC: CPT | Performed by: FAMILY MEDICINE

## 2017-03-31 PROCEDURE — 36415 COLL VENOUS BLD VENIPUNCTURE: CPT | Performed by: FAMILY MEDICINE

## 2017-03-31 PROCEDURE — 99395 PREV VISIT EST AGE 18-39: CPT | Mod: 25 | Performed by: FAMILY MEDICINE

## 2017-03-31 RX ORDER — CITALOPRAM HYDROBROMIDE 40 MG/1
40 TABLET ORAL DAILY
Qty: 90 TABLET | Refills: 3 | Status: SHIPPED | OUTPATIENT
Start: 2017-03-31 | End: 2017-07-31

## 2017-03-31 RX ORDER — ALPRAZOLAM 0.25 MG
0.25 TABLET ORAL 3 TIMES DAILY PRN
Qty: 20 TABLET | Refills: 0 | Status: CANCELLED | OUTPATIENT
Start: 2017-03-31

## 2017-03-31 RX ORDER — OMEPRAZOLE 40 MG/1
40 CAPSULE, DELAYED RELEASE ORAL DAILY
Qty: 90 CAPSULE | Refills: 3 | Status: SHIPPED | OUTPATIENT
Start: 2017-03-31 | End: 2018-05-04

## 2017-03-31 RX ORDER — ALPRAZOLAM 0.25 MG
0.25 TABLET ORAL 3 TIMES DAILY PRN
Qty: 20 TABLET | Refills: 0 | Status: SHIPPED | OUTPATIENT
Start: 2017-03-31 | End: 2017-06-01

## 2017-03-31 ASSESSMENT — ANXIETY QUESTIONNAIRES
GAD7 TOTAL SCORE: 5
2. NOT BEING ABLE TO STOP OR CONTROL WORRYING: SEVERAL DAYS
7. FEELING AFRAID AS IF SOMETHING AWFUL MIGHT HAPPEN: SEVERAL DAYS
1. FEELING NERVOUS, ANXIOUS, OR ON EDGE: SEVERAL DAYS
5. BEING SO RESTLESS THAT IT IS HARD TO SIT STILL: NOT AT ALL
6. BECOMING EASILY ANNOYED OR IRRITABLE: SEVERAL DAYS
3. WORRYING TOO MUCH ABOUT DIFFERENT THINGS: SEVERAL DAYS

## 2017-03-31 ASSESSMENT — PATIENT HEALTH QUESTIONNAIRE - PHQ9: 5. POOR APPETITE OR OVEREATING: NOT AT ALL

## 2017-03-31 NOTE — PROGRESS NOTES
"SUBJECTIVE:     CC: Ashish Blas is an 36 year old male who presents for preventative health visit.     Has a history of GERD s/p Nissen 2008. Has had a return of GERD. Has started having reflux symptoms again for about 1 year. Initially after NIssen he had a hard time swallowing starchy foods as they would seem to get stuck. Now has no issue with those so is wondering if \"things have loosened up\".  Father also had Nissen and is also back on PPIs now.    Physical   Annual:     Getting at least 3 servings of Calcium per day::  NO    Bi-annual eye exam::  Yes    Dental care twice a year::  Yes    Sleep apnea or symptoms of sleep apnea::  Daytime drowsiness    Diet::  Regular (no restrictions)    Frequency of exercise::  1 day/week    Duration of exercise::  Less than 15 minutes    Taking medications regularly::  Yes    Medication side effects::  None    Additional concerns today::  YES          Anxiety Follow-Up    Status since last visit: Improved     Other associated symptoms:None    Complicating factors:   Significant life event: No   Current substance abuse: None  Depression symptoms: No  CELSO-7 SCORE 12/16/2014 4/8/2016 5/27/2016   Total Score 18 - -   Total Score - 18 11        GAD7           Today's PHQ-2 Score:   PHQ-2 ( 1999 Pfizer) 3/30/2017   Q1: Little interest or pleasure in doing things -   Q2: Feeling down, depressed or hopeless -   PHQ-2 Score -   Little interest or pleasure in doing things Not at all   Feeling down, depressed or hopeless Several days   PHQ-2 Score 1       Abuse: Current or Past(Physical, Sexual or Emotional)- No  Do you feel safe in your environment - Yes    Social History   Substance Use Topics     Smoking status: Former Smoker     Years: 10.00     Types: Cigarettes     Start date: 7/19/2016     Smokeless tobacco: Never Used      Comment: Quit date is 5/30/2016     Alcohol use Yes      Comment: occ     The patient does not drink >3 drinks per day nor >7 drinks per week.    Last PSA: " No results found for: PSA    Recent Labs   Lab Test  01/20/14   0906   CHOL  269*   HDL  35*   LDL  202*   TRIG  158*   CHOLHDLRATIO  8.0*       Reviewed orders with patient. Reviewed health maintenance and updated orders accordingly - Yes    Reviewed and updated as needed this visit by clinical staff         Reviewed and updated as needed this visit by Provider        History reviewed. No pertinent past medical history.   Past Surgical History:   Procedure Laterality Date     APPENDECTOMY  10/15/2009    age 30 approx     COLONOSCOPY  2/17/2014    Procedure: COLONOSCOPY;  Colonoscopy  ;  Surgeon: Abdon Pagna MD;  Location: [facility]     COLONOSCOPY N/A 5/11/2016    Procedure: COLONOSCOPY;  Surgeon: Christian Malagon MD;  Location: [facility]     ENDOSCOPY      many since he was 18     procedure for acid reflux  08/15/2009       ROS:  Constitutional, neuro, ENT, endocrine, pulmonary, cardiac, gastrointestinal, genitourinary, musculoskeletal, integument and psychiatric systems are negative, except as otherwise noted.     Problem list, Medication list, Allergies, and Medical/Social/Surgical histories reviewed in [facility] and updated as appropriate.  Labs reviewed in EPIC  Patient Active Problem List   Diagnosis     Esophageal reflux     Anxiety     Mixed hyperlipidemia     Past Surgical History:   Procedure Laterality Date     APPENDECTOMY  10/15/2009    age 30 approx     COLONOSCOPY  2/17/2014    Procedure: COLONOSCOPY;  Colonoscopy  ;  Surgeon: Abdon Pagan MD;  Location: [facility]     COLONOSCOPY N/A 5/11/2016    Procedure: COLONOSCOPY;  Surgeon: Christian Malagon MD;  Location: [facility]     ENDOSCOPY      many since he was 18     procedure for acid reflux  08/15/2009       Social History   Substance Use Topics     Smoking status: Former Smoker     Years: 10.00     Types: Cigarettes     Start date: 7/19/2016     Smokeless tobacco: Never Used      Comment: Quit date is 5/30/2016     Alcohol use Yes       Comment: occ     Family History   Problem Relation Age of Onset     Hypertension Mother      DIABETES Mother      Lipids Mother      Hyperlipidemia Mother      Lipids Father      CANCER Father      Non Hodgkins Lymphoma     Hyperlipidemia Father      DIABETES Maternal Grandmother      Hypertension Maternal Grandmother      CEREBROVASCULAR DISEASE Maternal Grandmother      70     CANCER Maternal Grandmother      rectal cancer     HEART DISEASE Maternal Grandfather      later 50s     Breast Cancer Paternal Grandmother      Lipids Brother      CANCER Sister      skin cancer     Cancer - colorectal Brother 37     Hypothyroidism Brother          Current Outpatient Prescriptions   Medication Sig Dispense Refill     ESOMEPRAZOLE MAGNESIUM PO        citalopram (CELEXA) 40 MG tablet Take 1 tablet (40 mg) by mouth daily 90 tablet 03     omeprazole (PRILOSEC) 40 MG capsule Take 1 capsule (40 mg) by mouth daily Take 30-60 minutes before a meal. 90 capsule 3     ALPRAZolam (XANAX) 0.25 MG tablet Take 1 tablet (0.25 mg) by mouth 3 times daily as needed for anxiety 20 tablet 0     MULTI-VITAMIN OR TABS 1 TABLET ORALLY DAILY       VITAMIN B COMPLEX OR 1 TABLET ORALLY DAILY       [DISCONTINUED] citalopram (CELEXA) 40 MG tablet Take 1 tablet (40 mg) by mouth daily To start 1/2 tab daily x 6 days then full tab daily. 90 tablet 03     Acetaminophen (TYLENOL PO)        No Known Allergies  OBJECTIVE:     There were no vitals taken for this visit.  EXAM:  GENERAL: healthy, alert and no distress  EYES: Eyes grossly normal to inspection, PERRL and conjunctivae and sclerae normal  HENT: ear canals and TM's normal, nose and mouth without ulcers or lesions  NECK: no adenopathy, no asymmetry, masses, or scars and thyroid normal to palpation  RESP: lungs clear to auscultation - no rales, rhonchi or wheezes  CV: regular rate and rhythm, normal S1 S2, no S3 or S4, no murmur, click or rub, no peripheral edema and peripheral pulses strong  ABDOMEN:  soft, nontender, no hepatosplenomegaly, no masses and bowel sounds normal  MS: no gross musculoskeletal defects noted, no edema  SKIN: no suspicious lesions or rashes  NEURO: Normal strength and tone, mentation intact and speech normal  PSYCH: mentation appears normal, affect normal/bright    ASSESSMENT/PLAN:     1. Encounter for well adult exam with abnormal findings    2. Anxiety  Stable. Refilled medication.    - citalopram (CELEXA) 40 MG tablet; Take 1 tablet (40 mg) by mouth daily  Dispense: 90 tablet; Refill: 03  - ALPRAZolam (XANAX) 0.25 MG tablet; Take 1 tablet (0.25 mg) by mouth 3 times daily as needed for anxiety  Dispense: 20 tablet; Refill: 0  - TSH with free T4 reflex    3. Gastroesophageal reflux disease, esophagitis presence not specified  Suboptimally controlled. Start prescription strength omeprazole.  Will also have him follow-up with GI given history of Nissen to discuss further management,  - omeprazole (PRILOSEC) 40 MG capsule; Take 1 capsule (40 mg) by mouth daily Take 30-60 minutes before a meal.  Dispense: 90 capsule; Refill: 3  - GASTROENTEROLOGY ADULT REF CONSULT ONLY  - CBC with platelets differential    4. Mixed hyperlipidemia  Will try lifestyle measures first. Future lipid pended so he can see if that's working to lower lipids. If not may need to start medications. Developed hyperlipidemia in his late teens. Family history of CAD.  - Lipid panel reflex to direct LDL  - Comprehensive metabolic panel  - Lipid panel reflex to direct LDL; Future    COUNSELING:   Reviewed preventive health counseling, as reflected in patient instructions    BP Screening:   Last 3 BP Readings:    BP Readings from Last 3 Encounters:   03/31/17 131/76   02/10/17 144/76   11/08/16 128/74       The following was recommended to the patient:  Re-screen BP within a year and recommended lifestyle modifications     reports that he has quit smoking. His smoking use included Cigarettes. He started smoking about 8 months  "ago. He quit after 10.00 years of use. He has never used smokeless tobacco.    Estimated body mass index is 28.43 kg/(m^2) as calculated from the following:    Height as of 11/8/16: 6' 0.75\" (1.848 m).    Weight as of 11/8/16: 214 lb (97.1 kg).   Weight management plan: Discussed healthy diet and exercise guidelines and patient will follow up in 12 months in clinic to re-evaluate.    Counseling Resources:  ATP IV Guidelines  Pooled Cohorts Equation Calculator  FRAX Risk Assessment  ICSI Preventive Guidelines  Dietary Guidelines for Americans, 2010  USDA's MyPlate  ASA Prophylaxis  Lung CA Screening    Kimmie Pack MD  [facility]   "

## 2017-03-31 NOTE — MR AVS SNAPSHOT
After Visit Summary   3/31/2017    Ashish Blas    MRN: [id number]           Patient Information     Date Of Birth          1980        Visit Information        Provider Department      3/31/2017 7:40 AM Kimmie Pack MD [facility]        Today's Diagnoses     Gastroesophageal reflux disease, esophagitis presence not specified    -  1    Anxiety        Encounter for well adult exam with abnormal findings        Mixed hyperlipidemia          Care Instructions      Preventive Health Recommendations  Male Ages 26 - 39    Yearly exam:             See your health care provider every year in order to  o   Review health changes.   o   Discuss preventive care.    o   Review your medicines if your doctor has prescribed any.    You should be tested each year for STDs (sexually transmitted diseases), if you re at risk.     After age 35, talk to your provider about cholesterol testing. If you are at risk for heart disease, have your cholesterol tested at least every 5 years.     If you are at risk for diabetes, you should have a diabetes test (fasting glucose).  Shots: Get a flu shot each year. Get a tetanus shot every 10 years.     Nutrition:    Eat at least 5 servings of fruits and vegetables daily.     Eat whole-grain bread, whole-wheat pasta and brown rice instead of white grains and rice.     Talk to your provider about Calcium and Vitamin D.     Lifestyle    Exercise for at least 150 minutes a week (30 minutes a day, 5 days a week). This will help you control your weight and prevent disease.     Limit alcohol to one drink per day.     No smoking.     Wear sunscreen to prevent skin cancer.     See your dentist every six months for an exam and cleaning.           Follow-ups after your visit        Additional Services     GASTROENTEROLOGY ADULT REF CONSULT ONLY       Preferred Location: [facility] [phone], [facility] [place], [place]: [phone] and [place] [facility] ([phone])  [phone]    Has a history of GERD s/p Nissen 2008. Has had a return of GERD. Has started having reflux symptoms again for about 1 year. Initially after Nissen he had a hard time swallowing starchy foods as they would seem to get stuck. Now has no issue with those so is wondering if \"things have loosened up\".  Father also had Nissen and is also back on PPIs now.    Please be aware that coverage of these services is subject to the terms and limitations of your health insurance plan.  Call member services at your health plan with any benefit or coverage questions.  Any procedures must be performed at a [place] facility OR coordinated by your clinic's referral office.    Please bring the following with you to your appointment:    (1) Any X-Rays, CTs or MRIs which have been performed.  Contact the facility where they were done to arrange for  prior to your scheduled appointment.    (2) List of current medications   (3) This referral request   (4) Any documents/labs given to you for this referral                  Future tests that were ordered for you today     Open Future Orders        Priority Expected Expires Ordered    Lipid panel reflex to direct LDL Routine  3/31/2018 3/31/2017            Who to contact     If you have questions or need follow up information about today's clinic visit or your schedule please contact [facility] directly at [phone].  Normal or non-critical lab and imaging results will be communicated to you by MyChart, letter or phone within 4 business days after the clinic has received the results. If you do not hear from us within 7 days, please contact the clinic through MyChart or phone. If you have a critical or abnormal lab result, we will notify you by phone as soon as possible.  Submit refill requests through "[employer] or call your pharmacy and they will forward the refill request to us. Please allow 3 business days for your refill to be completed.          " "Additional Information About Your Visit        [employer] Information     [email or web address] gives you secure access to your electronic health record. If you see a primary care provider, you can also send messages to your care team and make appointments. If you have questions, please call your primary care clinic.  If you do not have a primary care provider, please call [phone] and they will assist you.        Care EveryWhere ID     This is your Care EveryWhere ID. This could be used by other organizations to access your [facility] medical records  [id number]        Your Vitals Were     Pulse Temperature Height BMI (Body Mass Index)          69 97.7  F (36.5  C) (Tympanic) 6' 0.75\" (1.848 m) 27.87 kg/m2         Blood Pressure from Last 3 Encounters:   03/31/17 131/76   02/10/17 144/76   11/08/16 128/74    Weight from Last 3 Encounters:   03/31/17 209 lb 12.8 oz (95.2 kg)   11/08/16 214 lb (97.1 kg)   08/23/16 204 lb (92.5 kg)              We Performed the Following     CBC with platelets differential     Comprehensive metabolic panel     GASTROENTEROLOGY ADULT REF CONSULT ONLY     Lipid panel reflex to direct LDL     TSH with free T4 reflex          Today's Medication Changes          These changes are accurate as of: 3/31/17  8:22 AM.  If you have any questions, ask your nurse or doctor.               Start taking these medicines.        Dose/Directions    omeprazole 40 MG capsule   Commonly known as:  priLOSEC   Used for:  Gastroesophageal reflux disease, esophagitis presence not specified   Started by:  Kimmie Pack MD        Dose:  40 mg   Take 1 capsule (40 mg) by mouth daily Take 30-60 minutes before a meal.   Quantity:  90 capsule   Refills:  3         These medicines have changed or have updated prescriptions.        Dose/Directions    citalopram 40 MG tablet   Commonly known as:  celeXA   This may have changed:  additional instructions   Used for:  Anxiety   Changed by:  Kimmie Pack MD     "    Dose:  40 mg   Take 1 tablet (40 mg) by mouth daily   Quantity:  90 tablet   Refills:  03         Stop taking these medicines if you haven't already. Please contact your care team if you have questions.     albuterol 108 (90 BASE) MCG/ACT Inhaler   Commonly known as:  PROAIR HFA/PROVENTIL HFA/VENTOLIN HFA   Stopped by:  Kimmie Pack MD           amoxicillin-clavulanate 875-125 MG per tablet   Commonly known as:  AUGMENTIN   Stopped by:  Kimmie Pack MD           fluticasone 50 MCG/ACT spray   Commonly known as:  FLONASE   Stopped by:  Kimmie Pack MD           predniSONE 20 MG tablet   Commonly known as:  DELTASONE   Stopped by:  Kimmie Pack MD           ZOLOFT PO   Stopped by:  Kimmie Pack MD                Where to get your medicines      These medications were sent to [id number] [place], [place] - [address]  [address] [facility] [address]    Hours:  Closed 10-16-08 business to [facility] Phone:  [phone]     citalopram 40 MG tablet    omeprazole 40 MG capsule         Some of these will need a paper prescription and others can be bought over the counter.  Ask your nurse if you have questions.     Bring a paper prescription for each of these medications     ALPRAZolam 0.25 MG tablet                Primary Care Provider Office Phone # Fax #    Kimmie Pack [phone]       [address] [address]        Thank you!     Thank you for choosing [facility]  for your care. Our goal is always to provide you with excellent care. Hearing back from our patients is one way we can continue to improve our services. Please take a few minutes to complete the written survey that you may receive in the mail after your visit with us. Thank you!             Your Updated Medication List - Protect others around you: Learn how to safely  use, store and throw away your medicines at www.disposemymeds.org.          This list is accurate as of: 3/31/17  8:22 AM.  Always use your most recent med list.                   Brand Name Dispense Instructions for use    ALPRAZolam 0.25 MG tablet    XANAX    20 tablet    Take 1 tablet (0.25 mg) by mouth 3 times daily as needed for anxiety       citalopram 40 MG tablet    celeXA    90 tablet    Take 1 tablet (40 mg) by mouth daily       ESOMEPRAZOLE MAGNESIUM PO          Multi-vitamin Tabs tablet   Generic drug:  multivitamin, therapeutic with minerals      1 TABLET ORALLY DAILY       omeprazole 40 MG capsule    priLOSEC    90 capsule    Take 1 capsule (40 mg) by mouth daily Take 30-60 minutes before a meal.       TYLENOL PO          VITAMIN B COMPLEX PO      1 TABLET ORALLY DAILY

## 2017-03-31 NOTE — NURSING NOTE
"Chief Complaint   Patient presents with     Physical       Initial /76 (BP Location: Right arm, Patient Position: Chair, Cuff Size: Adult Regular)  Pulse 69  Temp 97.7  F (36.5  C) (Tympanic)  Ht 6' 0.75\" (1.848 m)  Wt 209 lb 12.8 oz (95.2 kg)  BMI 27.87 kg/m2 Estimated body mass index is 27.87 kg/(m^2) as calculated from the following:    Height as of this encounter: 6' 0.75\" (1.848 m).    Weight as of this encounter: 209 lb 12.8 oz (95.2 kg).  Medication Reconciliation: complete    Health Maintenance that is potentially due pending provider review:  NONE and PHQ9    Akilah SPANGLER MA        "

## 2017-04-01 ASSESSMENT — PATIENT HEALTH QUESTIONNAIRE - PHQ9: SUM OF ALL RESPONSES TO PHQ QUESTIONS 1-9: 3

## 2017-04-01 ASSESSMENT — ANXIETY QUESTIONNAIRES: GAD7 TOTAL SCORE: 5

## 2017-04-11 ENCOUNTER — MYC MEDICAL ADVICE (OUTPATIENT)
Dept: FAMILY MEDICINE | Facility: CLINIC | Age: 37
End: 2017-04-11

## 2017-06-01 ENCOUNTER — MYC REFILL (OUTPATIENT)
Dept: FAMILY MEDICINE | Facility: CLINIC | Age: 37
End: 2017-06-01

## 2017-06-01 DIAGNOSIS — F41.9 ANXIETY: ICD-10-CM

## 2017-06-02 RX ORDER — ALPRAZOLAM 0.25 MG
0.25 TABLET ORAL 3 TIMES DAILY PRN
Qty: 20 TABLET | Refills: 0 | Status: SHIPPED | OUTPATIENT
Start: 2017-06-02 | End: 2017-12-06

## 2017-06-02 NOTE — TELEPHONE ENCOUNTER
Message from [employer]:  Original authorizing provider: MD Ashish Rick would like a refill of the following medications:  ALPRAZolam (XANAX) 0.25 MG tablet [Kimmie Pack MD]    Preferred pharmacy: [id number] [place] [address]    Comment:  Good morning, Can I get a script written for Alprazolam? We are going on a trip and would like to take these with me just in case stressful situations or panic attack happens. Thank you, Ashish Blas

## 2017-07-17 ASSESSMENT — ENCOUNTER SYMPTOMS
MUSCLE CRAMPS: 1
INSOMNIA: 0
POLYDIPSIA: 0
JOINT SWELLING: 0
NIGHT SWEATS: 0
HEARTBURN: 1
CHILLS: 0
SINUS PAIN: 1
SORE THROAT: 0
MYALGIAS: 1
WEIGHT GAIN: 0
NAUSEA: 1
ALTERED TEMPERATURE REGULATION: 0
FEVER: 0
BOWEL INCONTINENCE: 0
JAUNDICE: 0
DEPRESSION: 0
DECREASED APPETITE: 0
WEIGHT LOSS: 0
ARTHRALGIAS: 1
BLOOD IN STOOL: 0
POLYPHAGIA: 0
PANIC: 1
HALLUCINATIONS: 0
HOARSE VOICE: 0
NECK MASS: 0
BACK PAIN: 1
DIARRHEA: 1
NERVOUS/ANXIOUS: 1
MUSCLE WEAKNESS: 1
INCREASED ENERGY: 0
ABDOMINAL PAIN: 1
RECTAL PAIN: 1
FATIGUE: 1
BLOATING: 1
RECTAL BLEEDING: 1
NECK PAIN: 1
VOMITING: 0
STIFFNESS: 1
CONSTIPATION: 0
TASTE DISTURBANCE: 0
SMELL DISTURBANCE: 0
SINUS CONGESTION: 1
TROUBLE SWALLOWING: 0
DECREASED CONCENTRATION: 1

## 2017-07-19 ENCOUNTER — PRE VISIT (OUTPATIENT)
Dept: GASTROENTEROLOGY | Facility: CLINIC | Age: 37
End: 2017-07-19

## 2017-07-19 NOTE — TELEPHONE ENCOUNTER
1.  Date/reason for appt: 7/31/17-Gastroesophageal reflux disease    2.  Referring provider: Dr. Pack - internal     3.  Call to patient (Yes / No - short description): No - pt is referred. All records, imaging and procedures are in Epic.     4.  Previous care at / records requested from:     1. [facility] - 3/31/17   2. Op note Nissen 8/7/09 in Epic

## 2017-07-28 ENCOUNTER — TELEPHONE (OUTPATIENT)
Dept: GASTROENTEROLOGY | Facility: CLINIC | Age: 37
End: 2017-07-28

## 2017-07-31 ENCOUNTER — OFFICE VISIT (OUTPATIENT)
Dept: GASTROENTEROLOGY | Facility: CLINIC | Age: 37
End: 2017-07-31

## 2017-07-31 ENCOUNTER — HOSPITAL ENCOUNTER (OUTPATIENT)
Facility: CLINIC | Age: 37
End: 2017-07-31
Attending: INTERNAL MEDICINE | Admitting: INTERNAL MEDICINE

## 2017-07-31 VITALS
HEART RATE: 74 BPM | BODY MASS INDEX: 23.97 KG/M2 | TEMPERATURE: 98.2 F | SYSTOLIC BLOOD PRESSURE: 132 MMHG | WEIGHT: 180.9 LBS | DIASTOLIC BLOOD PRESSURE: 81 MMHG | HEIGHT: 73 IN

## 2017-07-31 DIAGNOSIS — K21.9 GASTROESOPHAGEAL REFLUX DISEASE, ESOPHAGITIS PRESENCE NOT SPECIFIED: Primary | ICD-10-CM

## 2017-07-31 DIAGNOSIS — Z98.890 S/P NISSEN FUNDOPLICATION (WITHOUT GASTROSTOMY TUBE) PROCEDURE: ICD-10-CM

## 2017-07-31 RX ORDER — SERTRALINE HYDROCHLORIDE 100 MG/1
TABLET, FILM COATED ORAL
COMMUNITY
Start: 2013-10-05 | End: 2020-10-27 | Stop reason: SINTOL

## 2017-07-31 ASSESSMENT — PAIN SCALES - GENERAL: PAINLEVEL: MILD PAIN (3)

## 2017-07-31 NOTE — PATIENT INSTRUCTIONS
Great to meet you today    Schedule upper endoscopy with Dr. Angeles at [address]  Does not need to be mac   You are scheduled on August 22  Check in time is 230 pm   [facility]   [address] [address] [facility]   You will need a   You will be sent the instructions  A pre assessment nurse will call about one hour prior to procedure      Schedule esophagram to look for evidence of slipped Nissen  You are scheduled August 16  Check in time is 7301 [address] [facility] floor imaging department  Nothing to eat or drink after  midnight      Continue omeprazole and follow anti-reflux precautions      Follow up with Rashaad Stockton in 2-3 months

## 2017-07-31 NOTE — MR AVS SNAPSHOT
After Visit Summary   7/31/2017    Ashish Blas    MRN: [id number]           Patient Information     Date Of Birth          1980        Visit Information        Provider Department      7/31/2017 8:40 AM Thor Angeles MD [facility]        Today's Diagnoses     Gastroesophageal reflux disease, esophagitis presence not specified    -  1    S/P Nissen fundoplication (without gastrostomy tube) procedure          Care Instructions    Great to meet you today    Schedule upper endoscopy with Dr. Angeles at [address]  Does not need to be mac   You are scheduled on August 22  Check in time is 230 pm   [address]   You will need a   You will be sent the instructions  A pre assessment nurse will call about one hour prior to procedure      Schedule esophagram to look for evidence of slipped Nissen  You are scheduled August 16  Check in time is 9952 [address] [facility] imaging department  Nothing to eat or drink after  midnight      Continue omeprazole and follow anti-reflux precautions      Follow up with Rashaad Stockton in 2-3 months            Follow-ups after your visit        Additional Services     GASTROENTEROLOGY ADULT REF PROCEDURE ONLY       Last Lab Result: Creatinine (mg/dL)       Date                     Value                 03/31/2017               0.74             ----------  Body mass index is 24.03 kg/(m^2).     Needed:  No  Language:  English    Patient will be contacted to schedule procedure.     Please be aware that coverage of these services is subject to the terms and limitations of your health insurance plan.  Call member services at your health plan with any benefit or coverage questions.  Any procedures must be performed at a [facility] facility OR coordinated by your clinic's referral office.    Please bring the following with you to your appointment:    (1) Any X-Rays, CTs or MRIs which have been  performed.  Contact the facility where they were done to arrange for  prior to your scheduled appointment.    (2) List of current medications   (3) This referral request   (4) Any documents/labs given to you for this referral                  Your next 10 appointments already scheduled     Aug 16, 2017 11:30 AM CDT   XR ESOPHAGRAM with UCXR2, UC GIGU RAD   [facility] ([facility])    [address] [place] [address]  [address] [facility] [address] [phone]           Please bring a list of your current medicines to your exam. (Include vitamins, minerals and over-the-counter medicines.) Leave your valuables at home.  Tell the doctor if there is a chance you could be pregnant.  Do not eat for 8 hours before the exam. Keep drinking clear liquids until 2 hours before the exam.  You may take pain medicine (with a sip of water) up to 4 hours before the exam.  Do not swallow any other medicines unless your doctor tells you to. Talk to your doctor to be sure it s safe to stop your medicines.  Please call the Imaging Department at your exam site with any questions.            Oct 02, 2017  7:00 AM CDT   (Arrive by 6:45 AM)   Return Visit with Rashaad Stockton PA-C   [facility] ([facility])    [address] [place] [address]  [address] [facility] [address] [phone]              Future tests that were ordered for you today     Open Future Orders        Priority Expected Expires Ordered    XR Esophagram Routine 7/31/2017 7/31/2018 7/31/2017            Who to contact     Please call your clinic at [phone] to:    Ask questions about your health    Make or cancel appointments    Discuss your medicines    Learn about your test results    Speak to your doctor   If you have compliments or concerns about an experience at your clinic, or if you wish to file a complaint, please contact [facility] Patient  "Relations at [phone] or email us at [email or web address]         Additional Information About Your Visit        [employer] Information     [employer] gives you secure access to your electronic health record. If you see a primary care provider, you can also send messages to your care team and make appointments. If you have questions, please call your primary care clinic.  If you do not have a primary care provider, please call [phone] and they will assist you.      [employer] is an electronic gateway that provides easy, online access to your medical records. With [employer], you can request a clinic appointment, read your test results, renew a prescription or communicate with your care team.     To access your existing account, please contact your [facility] or call [phone] for assistance.        Care EveryWhere ID     This is your Care EveryWhere ID. This could be used by other organizations to access your [facility] medical records  [id number]        Your Vitals Were     Pulse Temperature Height BMI (Body Mass Index)          74 98.2  F (36.8  C) (Oral) 1.848 m (6' 0.75\") 24.03 kg/m2         Blood Pressure from Last 3 Encounters:   07/31/17 132/81   03/31/17 131/76   02/10/17 144/76    Weight from Last 3 Encounters:   07/31/17 82.1 kg (180 lb 14.4 oz)   03/31/17 95.2 kg (209 lb 12.8 oz)   11/08/16 97.1 kg (214 lb)              We Performed the Following     GASTROENTEROLOGY ADULT REF PROCEDURE ONLY          Today's Medication Changes          These changes are accurate as of: 7/31/17  9:31 AM.  If you have any questions, ask your nurse or doctor.               Stop taking these medicines if you haven't already. Please contact your care team if you have questions.     citalopram 40 MG tablet   Commonly known as:  celeXA   Stopped by:  Thor Angeles MD           ESOMEPRAZOLE MAGNESIUM PO   Stopped by:  Thor Angeles MD                    Primary " Care Provider Office Phone # Fax #    Kimmie Jennifer Pack [phone]       [facility] [address] [place] [address]        Equal Access to Services     SHANI CHEUNG : Hadii demian ku williamo Somarkusali, waaxda luqadaha, qaybta kaalmada adeegyada, waxlea leonn antonio sandoval laLadirebecca nolan. So [facility] [phone].    ATENCIÓN: Si habla español, tiene a powell disposición servicios gratuitos de asistencia lingüística. Llame al [phone].    We comply with applicable federal civil rights laws and [place] laws. We do not discriminate on the basis of race, color, national origin, age, disability sex, sexual orientation or gender identity.            Thank you!     Thank you for choosing [facility]  for your care. Our goal is always to provide you with excellent care. Hearing back from our patients is one way we can continue to improve our services. Please take a few minutes to complete the written survey that you may receive in the mail after your visit with us. Thank you!             Your Updated Medication List - Protect others around you: Learn how to safely use, store and throw away your medicines at www.disposemymeds.org.          This list is accurate as of: 7/31/17  9:31 AM.  Always use your most recent med list.                   Brand Name Dispense Instructions for use Diagnosis    ALPRAZolam 0.25 MG tablet    XANAX    20 tablet    Take 1 tablet (0.25 mg) by mouth 3 times daily as needed for anxiety    Anxiety       Multi-vitamin Tabs tablet   Generic drug:  multivitamin, therapeutic with minerals      1 TABLET ORALLY DAILY        omeprazole 40 MG capsule    priLOSEC    90 capsule    Take 1 capsule (40 mg) by mouth daily Take 30-60 minutes before a meal.    Gastroesophageal reflux disease, esophagitis presence not specified       sertraline 100 MG tablet    ZOLOFT     TAKE ONE TABLET BY MOUTH ONE TIME DAILY        TYLENOL PO           VITAMIN  B COMPLEX PO      1 TABLET ORALLY DAILY

## 2017-07-31 NOTE — NURSING NOTE
Printed after visit summary given to pt along with verbal instructions.  Esophogram  and egd scheduled.  Has follow up appt.

## 2017-07-31 NOTE — PROGRESS NOTES
GI CLINIC VISIT - NEW PATIENT    CC/REFERRING MD:   Kimmie Pack    REASON FOR CONSULTATION:  GERD and history of Nissen    HPI:  36 year old male with life long history of GERD. Has had hiatal hernia in the past (See EPIC procedure notes). Underwent Nissen fundoplication in 2009 with good results. However, symptoms of reflux returned this January. Burning coming up chest and into back of throat. Worse with lying down at night. His PCP game him omeprazole which has helped - though he thinks this med causes some epigastric burning.    No dysphagia or odynophagia. After surgery he had trouble swallowing rice but this has resolved.    No changes in BMs.    ROS:  10pt ROS performed and otherwise negative.    PERTINENT PAST MEDICAL HISTORY:  Past Medical History:   Diagnosis Date     Anxiety 2002     Esophageal reflux 1/15/17    Started around this time again.     Stomach problems     gas, burning sensation       PREVIOUS ABDOMINAL/GYNECOLOGIC SURGERIES:  Past Surgical History:   Procedure Laterality Date     ABDOMEN SURGERY  08/1/2009    niacin     APPENDECTOMY  10/15/2009    age 30 approx     COLONOSCOPY  2/17/2014    Procedure: COLONOSCOPY;  Colonoscopy  ;  Surgeon: Abdon Pagan MD;  Location: [facility]     COLONOSCOPY N/A 5/11/2016    Procedure: COLONOSCOPY;  Surgeon: Christian Malagon MD;  Location: [facility]     ENDOSCOPY      many since he was 18     procedure for acid reflux  08/15/2009         PREVIOUS ENDOSCOPY:  EGDs reviewed  2 normal colonoscopies (last 2016)    PERTINENT MEDICATIONS:  Current Outpatient Prescriptions   Medication     sertraline (ZOLOFT) 100 MG tablet     ALPRAZolam (XANAX) 0.25 MG tablet     omeprazole (PRILOSEC) 40 MG capsule     Acetaminophen (TYLENOL PO)     MULTI-VITAMIN OR TABS     VITAMIN B COMPLEX OR     No current facility-administered medications for this visit.        Medications reviewed with patient today, see Medication List/Assessment for details.  No other  "NSAID/anticoagulation reported by patient.  No other OTC/herbal/supplements reported by patient.    SOCIAL HISTORY:  Does not smoke. Drinks alcohol a couple times per week. Working.    FAMILY HISTORY:  No panc/esophageal/other GI CA, no other HNPCC-related Shanda.  No IBD/celiac, no other AI/liver/thyroid disease.    Grandmother with rectal cancer    Brother with colon cancer in his 30s.    Mother - HTN and diabetes    PHYSICAL EXAMINATION:  Vitals reviewed, AFVSS  /81  Pulse 74  Temp 98.2  F (36.8  C) (Oral)  Ht 1.848 m (6' 0.75\")  Wt 82.1 kg (180 lb 14.4 oz)  BMI 24.03 kg/m2  Gen: aaox3, cooperative, pleasant, not dyspneic/diaphoretic, nad  HEENT: ncat, neck supple, no clad/sclad, normal op w/o ulcer/exudate, anicteric, mmm  Resp/CV unremarkable  Abd:  Soft, nt/nd  Ext: no c/c/e  Skin: warm, perfused, no jaundice  Neuro: grossly intact, no asterixis noted    PERTINENT STUDIES:    Office Visit on 03/31/2017   Component Date Value Ref Range Status     Cholesterol 03/31/2017 285* <200 mg/dL Final     Triglycerides 03/31/2017 93  <150 mg/dL Final     HDL Cholesterol 03/31/2017 39* >39 mg/dL Final     LDL Cholesterol Calculated 03/31/2017 227* <100 mg/dL Final     Non HDL Cholesterol 03/31/2017 246* <130 mg/dL Final     Sodium 03/31/2017 138  133 - 144 mmol/L Final     Potassium 03/31/2017 3.9  3.4 - 5.3 mmol/L Final     Chloride 03/31/2017 101  94 - 109 mmol/L Final     Carbon Dioxide 03/31/2017 30  20 - 32 mmol/L Final     Anion Gap 03/31/2017 7  3 - 14 mmol/L Final     Glucose 03/31/2017 84  70 - 99 mg/dL Final     Urea Nitrogen 03/31/2017 12  7 - 30 mg/dL Final     Creatinine 03/31/2017 0.74  0.66 - 1.25 mg/dL Final     GFR Estimate 03/31/2017   >60 mL/min/1.7m2 Final                    Value:>90  Non  GFR Calc       GFR Estimate If Black 03/31/2017   >60 mL/min/1.7m2 Final                    Value:>90   GFR Calc       Calcium 03/31/2017 9.4  8.5 - 10.1 mg/dL Final     " Bilirubin Total 03/31/2017 1.0  0.2 - 1.3 mg/dL Final     Albumin 03/31/2017 4.5  3.4 - 5.0 g/dL Final     Protein Total 03/31/2017 8.0  6.8 - 8.8 g/dL Final     Alkaline Phosphatase 03/31/2017 69  40 - 150 U/L Final     ALT 03/31/2017 64  0 - 70 U/L Final     AST 03/31/2017 32  0 - 45 U/L Final     WBC 03/31/2017 4.7  4.0 - 11.0 10e9/L Final     RBC Count 03/31/2017 4.58  4.4 - 5.9 10e12/L Final     Hemoglobin 03/31/2017 14.1  13.3 - 17.7 g/dL Final     Hematocrit 03/31/2017 40.6  40.0 - 53.0 % Final     MCV 03/31/2017 89  78 - 100 fl Final     MCH 03/31/2017 30.8  26.5 - 33.0 pg Final     MCHC 03/31/2017 34.7  31.5 - 36.5 g/dL Final     RDW 03/31/2017 12.4  10.0 - 15.0 % Final     Platelet Count 03/31/2017 218  150 - 450 10e9/L Final     Diff Method 03/31/2017 Automated Method   Final     % Neutrophils 03/31/2017 40.9  % Final     % Lymphocytes 03/31/2017 42.6  % Final     % Monocytes 03/31/2017 13.1  % Final     % Eosinophils 03/31/2017 3.0  % Final     % Basophils 03/31/2017 0.4  % Final     Absolute Neutrophil 03/31/2017 1.9  1.6 - 8.3 10e9/L Final     Absolute Lymphocytes 03/31/2017 2.0  0.8 - 5.3 10e9/L Final     Absolute Monocytes 03/31/2017 0.6  0.0 - 1.3 10e9/L Final     Absolute Eosinophils 03/31/2017 0.1  0.0 - 0.7 10e9/L Final     Absolute Basophils 03/31/2017 0.0  0.0 - 0.2 10e9/L Final     TSH 03/31/2017 0.99  0.40 - 4.00 mU/L Final       ASSESSMENT/PLAN:  1.  GERD with history of Nissen fundoplication. Symptoms are suspicious for slipped/loosened Nissen.   -check esophagram and EGD to further evaluate. If slipped will refer to thoracic surgery to consider a re-do  -continue PPI for now  -continue anti-reflux precautions    2. Family history of colon cancer. Brother with colon cancer in 30s and grandma with rectal cancer. Pt has had 2 colonoscopies without abnormality  -at follow up visit will discuss genetic counseling referral (evaluate for Kapoor Syndrome)    RTC 2 months with Rashaad Stockton, lula  if symptomatic.      Thank you for this consultation.  It was a pleasure to participate in the care of this patient; please contact us with any further questions.      Thor Angeles MD    [facility]   Department of Gastroenterology, Hepatology and Nutrition    Answers for HPI/ROS submitted by the patient on 7/17/2017   General Symptoms: Yes  Skin Symptoms: No  HENT Symptoms: Yes  EYE SYMPTOMS: No  HEART SYMPTOMS: No  LUNG SYMPTOMS: No  INTESTINAL SYMPTOMS: Yes  URINARY SYMPTOMS: No  REPRODUCTIVE SYMPTOMS: No  SKELETAL SYMPTOMS: Yes  BLOOD SYMPTOMS: No  NERVOUS SYSTEM SYMPTOMS: No  MENTAL HEALTH SYMPTOMS: Yes  Fever: No  Loss of appetite: No  Weight loss: No  Weight gain: No  Fatigue: Yes  Night sweats: No  Chills: No  Increased stress: Yes  Excessive hunger: No  Excessive thirst: No  Feeling hot or cold when others believe the temperature is normal: No  Loss of height: No  Post-operative complications: No  Surgical site pain: No  Hallucinations: No  Change in or Loss of Energy: No  Hyperactivity: No  Confusion: No  Ear pain: Yes  Ear discharge: No  Hearing loss: No  Tinnitus: No  Nosebleeds: No  Congestion: Yes  Sinus pain: Yes  Trouble swallowing: No   Voice hoarseness: No  Mouth sores: Yes  Sore throat: No  Tooth pain: No  Gum tenderness: Yes  Bleeding gums: Yes  Change in taste: No  Change in sense of smell: No  Dry mouth: No  Hearing aid used: No  Neck lump: No  Heart burn or indigestion: Yes  Nausea: Yes  Vomiting: No  Abdominal pain: Yes  Bloating: Yes  Constipation: No  Diarrhea: Yes  Blood in stool: No  Black stools: No  Rectal or Anal pain: Yes  Fecal incontinence: No  Rectal bleeding: Yes  Yellowing of skin or eyes: No  Vomit with blood: No  Change in stools: No  Hemorrhoids: Yes  Back pain: Yes  Muscle aches: Yes  Neck pain: Yes  Swollen joints: No  Joint pain: Yes  Bone pain: No  Muscle cramps: Yes  Muscle weakness: Yes  Joint stiffness: Yes  Bone fracture:  No  Nervous or Anxious: Yes  Depression: No  Trouble sleeping: No  Trouble thinking or concentrating: Yes  Mood changes: Yes  Panic attacks: Yes

## 2017-07-31 NOTE — LETTER
7/31/2017       RE: Ashish Blas  [id number]  [facility] [address]     Dear Colleague,    Thank you for referring your patient, Ashish Blas, to the [facility] GASTROENTEROLOGY AND IBD at [facility]. Please see a copy of my visit note below.    GI CLINIC VISIT - NEW PATIENT    CC/REFERRING MD:   Kimmie Pack    REASON FOR CONSULTATION:  GERD and history of Nissen    HPI:  36 year old male with life long history of GERD. Has had hiatal hernia in the past (See EPIC procedure notes). Underwent Nissen fundoplication in 2009 with good results. However, symptoms of reflux returned this January. Burning coming up chest and into back of throat. Worse with lying down at night. His PCP game him omeprazole which has helped - though he thinks this med causes some epigastric burning.    No dysphagia or odynophagia. After surgery he had trouble swallowing rice but this has resolved.    No changes in BMs.    ROS:  10pt ROS performed and otherwise negative.    PERTINENT PAST MEDICAL HISTORY:  Past Medical History:   Diagnosis Date     Anxiety 2002     Esophageal reflux 1/15/17    Started around this time again.     Stomach problems     gas, burning sensation       PREVIOUS ABDOMINAL/GYNECOLOGIC SURGERIES:  Past Surgical History:   Procedure Laterality Date     ABDOMEN SURGERY  08/1/2009    niacin     APPENDECTOMY  10/15/2009    age 30 approx     COLONOSCOPY  2/17/2014    Procedure: COLONOSCOPY;  Colonoscopy  ;  Surgeon: Abdon Pagan MD;  Location: [facility]     COLONOSCOPY N/A 5/11/2016    Procedure: COLONOSCOPY;  Surgeon: Christian Malagon MD;  Location: [facility]     ENDOSCOPY      many since he was 18     procedure for acid reflux  08/15/2009         PREVIOUS ENDOSCOPY:  EGDs reviewed  2 normal colonoscopies (last 2016)    PERTINENT MEDICATIONS:  Current Outpatient Prescriptions   Medication     sertraline (ZOLOFT) 100 MG tablet     ALPRAZolam (XANAX) 0.25 MG tablet  "    omeprazole (PRILOSEC) 40 MG capsule     Acetaminophen (TYLENOL PO)     MULTI-VITAMIN OR TABS     VITAMIN B COMPLEX OR     No current facility-administered medications for this visit.        Medications reviewed with patient today, see Medication List/Assessment for details.  No other NSAID/anticoagulation reported by patient.  No other OTC/herbal/supplements reported by patient.    SOCIAL HISTORY:  Does not smoke. Drinks alcohol a couple times per week. Working.    FAMILY HISTORY:  No panc/esophageal/other GI CA, no other HNPCC-related Shanda.  No IBD/celiac, no other AI/liver/thyroid disease.    Grandmother with rectal cancer    Brother with colon cancer in his 30s.    Mother - HTN and diabetes    PHYSICAL EXAMINATION:  Vitals reviewed, AFVSS  /81  Pulse 74  Temp 98.2  F (36.8  C) (Oral)  Ht 1.848 m (6' 0.75\")  Wt 82.1 kg (180 lb 14.4 oz)  BMI 24.03 kg/m2  Gen: aaox3, cooperative, pleasant, not dyspneic/diaphoretic, nad  HEENT: ncat, neck supple, no clad/sclad, normal op w/o ulcer/exudate, anicteric, mmm  Resp/CV unremarkable  Abd:  Soft, nt/nd  Ext: no c/c/e  Skin: warm, perfused, no jaundice  Neuro: grossly intact, no asterixis noted    PERTINENT STUDIES:    Office Visit on 03/31/2017   Component Date Value Ref Range Status     Cholesterol 03/31/2017 285* <200 mg/dL Final     Triglycerides 03/31/2017 93  <150 mg/dL Final     HDL Cholesterol 03/31/2017 39* >39 mg/dL Final     LDL Cholesterol Calculated 03/31/2017 227* <100 mg/dL Final     Non HDL Cholesterol 03/31/2017 246* <130 mg/dL Final     Sodium 03/31/2017 138  133 - 144 mmol/L Final     Potassium 03/31/2017 3.9  3.4 - 5.3 mmol/L Final     Chloride 03/31/2017 101  94 - 109 mmol/L Final     Carbon Dioxide 03/31/2017 30  20 - 32 mmol/L Final     Anion Gap 03/31/2017 7  3 - 14 mmol/L Final     Glucose 03/31/2017 84  70 - 99 mg/dL Final     Urea Nitrogen 03/31/2017 12  7 - 30 mg/dL Final     Creatinine 03/31/2017 0.74  0.66 - 1.25 mg/dL Final     GFR " Estimate 03/31/2017   >60 mL/min/1.7m2 Final                    Value:>90  Non  GFR Calc       GFR Estimate If Black 03/31/2017   >60 mL/min/1.7m2 Final                    Value:>90   GFR Calc       Calcium 03/31/2017 9.4  8.5 - 10.1 mg/dL Final     Bilirubin Total 03/31/2017 1.0  0.2 - 1.3 mg/dL Final     Albumin 03/31/2017 4.5  3.4 - 5.0 g/dL Final     Protein Total 03/31/2017 8.0  6.8 - 8.8 g/dL Final     Alkaline Phosphatase 03/31/2017 69  40 - 150 U/L Final     ALT 03/31/2017 64  0 - 70 U/L Final     AST 03/31/2017 32  0 - 45 U/L Final     WBC 03/31/2017 4.7  4.0 - 11.0 10e9/L Final     RBC Count 03/31/2017 4.58  4.4 - 5.9 10e12/L Final     Hemoglobin 03/31/2017 14.1  13.3 - 17.7 g/dL Final     Hematocrit 03/31/2017 40.6  40.0 - 53.0 % Final     MCV 03/31/2017 89  78 - 100 fl Final     MCH 03/31/2017 30.8  26.5 - 33.0 pg Final     MCHC 03/31/2017 34.7  31.5 - 36.5 g/dL Final     RDW 03/31/2017 12.4  10.0 - 15.0 % Final     Platelet Count 03/31/2017 218  150 - 450 10e9/L Final     Diff Method 03/31/2017 Automated Method   Final     % Neutrophils 03/31/2017 40.9  % Final     % Lymphocytes 03/31/2017 42.6  % Final     % Monocytes 03/31/2017 13.1  % Final     % Eosinophils 03/31/2017 3.0  % Final     % Basophils 03/31/2017 0.4  % Final     Absolute Neutrophil 03/31/2017 1.9  1.6 - 8.3 10e9/L Final     Absolute Lymphocytes 03/31/2017 2.0  0.8 - 5.3 10e9/L Final     Absolute Monocytes 03/31/2017 0.6  0.0 - 1.3 10e9/L Final     Absolute Eosinophils 03/31/2017 0.1  0.0 - 0.7 10e9/L Final     Absolute Basophils 03/31/2017 0.0  0.0 - 0.2 10e9/L Final     TSH 03/31/2017 0.99  0.40 - 4.00 mU/L Final       ASSESSMENT/PLAN:  1.  GERD with history of Nissen fundoplication. Symptoms are suspicious for slipped/loosened Nissen.   -check esophagram and EGD to further evaluate. If slipped will refer to thoracic surgery to consider a re-do  -continue PPI for now  -continue anti-reflux  precautions    2. Family history of colon cancer. Brother with colon cancer in 30s and grandma with rectal cancer. Pt has had 2 colonoscopies without abnormality  -at follow up visit will discuss genetic counseling referral (evaluate for Kapoor Syndrome)    RTC 2 months with Rashaad Stockton, sooner if symptomatic.      Thank you for this consultation.  It was a pleasure to participate in the care of this patient; please contact us with any further questions.      Thor Angeles MD

## 2017-07-31 NOTE — NURSING NOTE
"Chief Complaint   Patient presents with     Consult     Dx; Gastroesophageal reflux disease, esophagitis presence not specified        Vitals:    07/31/17 0820   BP: 132/81   Pulse: 74   Temp: 98.2  F (36.8  C)   TempSrc: Oral   Weight: 180 lb 14.4 oz   Height: 6' 0.75\"       Body mass index is 24.03 kg/(m^2).  Farnk Arciniega CMA                     "

## 2017-08-09 ENCOUNTER — HOSPITAL ENCOUNTER (EMERGENCY)
Facility: CLINIC | Age: 37
Discharge: HOME | End: 2017-08-09
Attending: EMERGENCY MEDICINE | Admitting: EMERGENCY MEDICINE
Payer: COMMERCIAL

## 2017-08-09 VITALS
OXYGEN SATURATION: 100 % | WEIGHT: 177 LBS | DIASTOLIC BLOOD PRESSURE: 77 MMHG | TEMPERATURE: 98 F | HEART RATE: 63 BPM | SYSTOLIC BLOOD PRESSURE: 122 MMHG | HEIGHT: 72 IN | BODY MASS INDEX: 23.98 KG/M2 | RESPIRATION RATE: 16 BRPM

## 2017-08-09 DIAGNOSIS — R21 RASH AND NONSPECIFIC SKIN ERUPTION: ICD-10-CM

## 2017-08-09 PROCEDURE — 99282 EMERGENCY DEPT VISIT SF MDM: CPT

## 2017-08-09 PROCEDURE — 99283 EMERGENCY DEPT VISIT LOW MDM: CPT | Performed by: EMERGENCY MEDICINE

## 2017-08-09 NOTE — ED AVS SNAPSHOT
[facility] Emergency Department    [address] [facility] [address]    Phone:  [phone]    Fax:  [phone]                                       Ashish Blas   MRN: [id number]    Department:  [facility] Emergency Department   Date of Visit:  8/9/2017           Patient Information     Date Of Birth          1980        Your diagnoses for this visit were:     Rash and nonspecific skin eruption        You were seen by Mark Cazares MD.      Follow-up Information     Follow up with [facility] Emergency Department.    Specialty:  EMERGENCY MEDICINE    Why:  If symptoms worsen    Contact information:    [address] [place] [address] [id number] [phone]    Additional information:    The medical center is located at   [address] [place] [address] (between [facility] and   [address] [facility], [place] north   of [place]).        Follow up with [facility].    Specialty:  Allergy    Why:  For re-evaluation if symptoms not resolving    Contact information:    [address] [place] [id number] [phone]    Additional information:    The medical center is located at   [address] [place] [address] (between [address]   [address] [facility], four [place] north   of [place]).        Discharge Instructions         Avoid nutritional yeast product which appears to have triggered  your rash/allergic reaction.    Use Benadryl 25 mg to 50 mg every 6 hours as needed for rash or itching.    Return if worsening, otherwise follow-up in allergy clinic if symptoms don't resolve promptly      Discharge References/Attachments     ALLERGIC REACTION, OTHER (GENERAL) (ENGLISH)      Future Appointments        Provider Department Dept Phone Center    8/16/2017 11:30 AM [facility]; [facility] IMAGING CENTER XRAY ROOM 2 [facility] Center Xray [phone] [facility]    10/2/2017 7:00 AM Rashaad Stockton PA-C [facility] Gastroenterology and [phone] [facility]      24 Hour  Appointment Hotline       To make an appointment at any [facility] clinic, call [phone] ([phone]). If you don't have a family doctor or clinic, we will help you find one. [facility] clinics are conveniently located to serve the needs of you and your family.             Review of your medicines      Our records show that you are taking the medicines listed below. If these are incorrect, please call your family doctor or clinic.        Dose / Directions Last dose taken    ALPRAZolam 0.25 MG tablet   Commonly known as:  XANAX   Dose:  0.25 mg   Quantity:  20 tablet        Take 1 tablet (0.25 mg) by mouth 3 times daily as needed for anxiety   Refills:  0        Multi-vitamin Tabs tablet   Generic drug:  multivitamin, therapeutic with minerals        1 TABLET ORALLY DAILY   Refills:  0        omeprazole 40 MG capsule   Commonly known as:  priLOSEC   Dose:  40 mg   Quantity:  90 capsule        Take 1 capsule (40 mg) by mouth daily Take 30-60 minutes before a meal.   Refills:  3        sertraline 100 MG tablet   Commonly known as:  ZOLOFT        TAKE ONE TABLET BY MOUTH ONE TIME DAILY   Refills:  0        TYLENOL PO        Refills:  0        VITAMIN B COMPLEX PO        1 TABLET ORALLY DAILY   Refills:  0                Orders Needing Specimen Collection     None      Pending Results     No orders found from 8/7/2017 to 8/10/2017.            Pending Culture Results     No orders found from 8/7/2017 to 8/10/2017.            Pending Results Instructions     If you had any lab results that were not finalized at the time of your Discharge, you can call the ED Lab Result RN at [phone]. You will be contacted by this team for any positive Lab results or changes in treatment. The nurses are available 7 days a week from 10A to 6:30P.  You can leave a message 24 hours per day and they will return your call.        Test Results From Your Hospital Stay               Thank you for choosing [facility]       Thank you for  choosing [facility] for your care. Our goal is always to provide you with excellent care. Hearing back from our patients is one way we can continue to improve our services. Please take a few minutes to complete the written survey that you may receive in the mail after you visit with us. Thank you!        [facility] Information     [facility] gives you secure access to your electronic health record. If you see a primary care provider, you can also send messages to your care team and make appointments. If you have questions, please call your primary care clinic.  If you do not have a primary care provider, please call [phone] and they will assist you.        Care EveryWhere ID     This is your Care EveryWhere ID. This could be used by other organizations to access your [facility] medical records  [id number]        Equal Access to Services     SHANI CHEUNG : Nba Browning, sabine sher, nola dao, jane nolan. So [facility] [phone].    ATENCIÓN: Si habla español, tiene a powell disposición servicios gratuitos de asistencia lingüística. Llame al [phone].    We comply with applicable federal civil rights laws and [place] laws. We do not discriminate on the basis of race, color, national origin, age, disability sex, sexual orientation or gender identity.            After Visit Summary       This is your record. Keep this with you and show to your community pharmacist(s) and doctor(s) at your next visit.

## 2017-08-09 NOTE — DISCHARGE INSTRUCTIONS
Avoid nutritional yeast product which appears to have triggered  your rash/allergic reaction.    Use Benadryl 25 mg to 50 mg every 6 hours as needed for rash or itching.    Return if worsening, otherwise follow-up in allergy clinic if symptoms don't resolve promptly.

## 2017-08-09 NOTE — ED AVS SNAPSHOT
[facility] Emergency Department    [address] [facility] [address] [facility] [address]    Phone:  [phone]    Fax:  [phone]                                       Ashish Blas   MRN: [id number]    Department:  [facility] Emergency Department   Date of Visit:  8/9/2017           After Visit Summary Signature Page     I have received my discharge instructions, and my questions have been answered. I have discussed any challenges I see with this plan with the nurse or doctor.    ..........................................................................................................................................  Patient/Patient Representative Signature      ..........................................................................................................................................  Patient Representative Print Name and Relationship to Patient    ..................................................               ................................................  Date                                            Time    ..........................................................................................................................................  Reviewed by Signature/Title    ...................................................              ..............................................  Date                                                            Time

## 2017-08-09 NOTE — ED NOTES
Pt with red hot rash on arms and legs and face Denies any new meds, changes in dietary and or chemical exposure in the last few days

## 2017-08-09 NOTE — ED PROVIDER NOTES
"  History     Chief Complaint   Patient presents with     Rash     body aches      HPI  Ashish Blas is a 36 year old male who is using a new Nutritional Yeast over the counter product with B vitamins and developed a \"burning\" erythematous rash to the arms and anterior knees a short time after taking this product this morning at ~ 0630 am.  He began using this product yesterday.  He has no other signs or symptoms of an allergic reaction or anaphylactic reaction.  He has no hives, angioedema, or lip, tongue or oropharyngeal swelling.  No shortness of breath, wheezing or chest pain.  No abdominal pain.  No other known potential allergen exposures.  No prior history of allergic reaction.  No other acute complaints or concerns, no other systemic symptoms or complaints.    I have reviewed the Medications, Allergies, Past Medical and Surgical History, and Social History in the Epic system.    Patient Active Problem List   Diagnosis     Esophageal reflux     Anxiety     Mixed hyperlipidemia     Past Medical History:   Diagnosis Date     Anxiety 2002     Esophageal reflux 1/15/17    Started around this time again.     Stomach problems     gas, burning sensation     Past Surgical History:   Procedure Laterality Date     ABDOMEN SURGERY  08/1/2009    niacin     APPENDECTOMY  10/15/2009    age 30 approx     COLONOSCOPY  2/17/2014    Procedure: COLONOSCOPY;  Colonoscopy  ;  Surgeon: Abdon Pagan MD;  Location: [facility]     COLONOSCOPY N/A 5/11/2016    Procedure: COLONOSCOPY;  Surgeon: Christian Malagon MD;  Location: [facility]     ENDOSCOPY      many since he was 18     procedure for acid reflux  08/15/2009     No current facility-administered medications for this encounter.      Current Outpatient Prescriptions   Medication     sertraline (ZOLOFT) 100 MG tablet     ALPRAZolam (XANAX) 0.25 MG tablet     omeprazole (PRILOSEC) 40 MG capsule     Acetaminophen (TYLENOL PO)     MULTI-VITAMIN OR TABS     VITAMIN B COMPLEX OR "     No Known Allergies  Social History   Substance Use Topics     Smoking status: Former Smoker     Years: 10.00     Types: Cigarettes     Start date: 7/19/2016     Quit date: 1/2/2017     Smokeless tobacco: Never Used      Comment: Quit date is 5/30/2016     Alcohol use Yes      Comment: occ     Family History   Problem Relation Age of Onset     Hypertension Mother      DIABETES Mother      Lipids Mother      Hyperlipidemia Mother      Lipids Father      CANCER Father      Non Hodgkins Lymphoma     Hyperlipidemia Father      DIABETES Maternal Grandmother      Hypertension Maternal Grandmother      CEREBROVASCULAR DISEASE Maternal Grandmother      70     CANCER Maternal Grandmother      rectal cancer     HEART DISEASE Maternal Grandfather      later 50s     Breast Cancer Paternal Grandmother      Lipids Brother      CANCER Sister      skin cancer     Cancer - colorectal Brother 37     Hypothyroidism Brother      Thyroid Disease Brother        Review of Systems   Constitutional: Negative.    HENT: Negative.    Respiratory: Negative.    Cardiovascular: Negative.    Gastrointestinal: Negative.    Musculoskeletal: Negative.    Skin: Positive for rash.       Physical Exam   BP: 122/77  Pulse: 63  Temp: 98  F (36.7  C)  Resp: 16  Height: 182.9 cm (6')  Weight: 80.3 kg (177 lb)  SpO2: 100 %  Physical Exam   Constitutional: He is oriented to person, place, and time. He appears well-developed and well-nourished. No distress.   HENT:   Head: Normocephalic and atraumatic.   Mouth/Throat: Oropharynx is clear and moist.   No facial, lip, tongue or oropharyngeal angioedema or swelling.   Eyes: Conjunctivae and EOM are normal. No scleral icterus.   Neck: Normal range of motion and phonation normal. Neck supple.   Cardiovascular: Normal heart sounds.    Pulmonary/Chest: Effort normal and breath sounds normal. No stridor. No respiratory distress.   Musculoskeletal: Normal range of motion. He exhibits no edema.   Neurological: He is  "alert and oriented to person, place, and time.   Skin: Skin is warm and dry. Rash (non-urticarial erythematous areas involving the dorsal and medial forearms and distal upper arms) noted. He is not diaphoretic. No pallor.   Psychiatric: He has a normal mood and affect. His behavior is normal.   Nursing note and vitals reviewed.      ED Course     ED Course     Procedures             Labs Ordered and Resulted from Time of ED Arrival Up to the Time of Departure from the ED - No data to display    9:04 AM - Markedly improved after cool compresses to the arms.  He declined Benadryl.  He states that he will take Benadryl OTC prn.    Assessments & Plan (with Medical Decision Making)   Patient appears to have an adverse reaction to the new over-the-counter nutritional supplement that he states is called \"nutritional yeast\" and has B vitamin.  He has erythematous flushing to the arms and anterior knees, but no other evidence of allergic or anaphylactic reaction. ? true Type I/IgE mediated allergic reaction vs  flushing/hypersensitivity from B vitamin/Niacin in the supplement. He appears stable and appropriate for discharge with plan to use Benadryl prn and avoid the supplement/precipitant.  He'll continue cool compresses which significantly helped when used in the emergency department.  He can follow-up and allergy clinic if symptoms don't resolve with discontinuation of use of the product. Patient was provided instructions for supportive care and will return as needed for worsened condition or worsening symptoms, or new problems or concerns.      I have reviewed the nursing notes.    I have reviewed the findings, diagnosis, plan and need for follow up with the patient.      New Prescriptions    No medications on file       Final diagnoses:   Rash and nonspecific skin eruption       8/9/2017   [facility] EMERGENCY DEPARTMENT     Mark Cazares MD  08/10/17 0700    "

## 2017-08-10 ENCOUNTER — OFFICE VISIT (OUTPATIENT)
Dept: FAMILY MEDICINE | Facility: CLINIC | Age: 37
End: 2017-08-10
Payer: COMMERCIAL

## 2017-08-10 VITALS
BODY MASS INDEX: 25.63 KG/M2 | TEMPERATURE: 97.6 F | SYSTOLIC BLOOD PRESSURE: 124 MMHG | DIASTOLIC BLOOD PRESSURE: 79 MMHG | WEIGHT: 189 LBS | HEART RATE: 62 BPM

## 2017-08-10 DIAGNOSIS — R20.8 BURNING SENSATION: ICD-10-CM

## 2017-08-10 DIAGNOSIS — N48.89 PENIS PAIN: Primary | ICD-10-CM

## 2017-08-10 LAB
ALBUMIN UR-MCNC: NEGATIVE MG/DL
APPEARANCE UR: CLEAR
BILIRUB UR QL STRIP: NEGATIVE
COLOR UR AUTO: YELLOW
GLUCOSE UR STRIP-MCNC: NEGATIVE MG/DL
HGB UR QL STRIP: NEGATIVE
KETONES UR STRIP-MCNC: 40 MG/DL
LEUKOCYTE ESTERASE UR QL STRIP: NEGATIVE
NITRATE UR QL: NEGATIVE
PH UR STRIP: 6 PH (ref 5–7)
SP GR UR STRIP: 1.01 (ref 1–1.03)
URN SPEC COLLECT METH UR: ABNORMAL
UROBILINOGEN UR STRIP-ACNC: 0.2 EU/DL (ref 0.2–1)

## 2017-08-10 PROCEDURE — 81003 URINALYSIS AUTO W/O SCOPE: CPT | Performed by: NURSE PRACTITIONER

## 2017-08-10 PROCEDURE — 87591 N.GONORRHOEAE DNA AMP PROB: CPT | Performed by: NURSE PRACTITIONER

## 2017-08-10 PROCEDURE — 87491 CHLMYD TRACH DNA AMP PROBE: CPT | Performed by: NURSE PRACTITIONER

## 2017-08-10 PROCEDURE — 99213 OFFICE O/P EST LOW 20 MIN: CPT | Performed by: NURSE PRACTITIONER

## 2017-08-10 ASSESSMENT — ENCOUNTER SYMPTOMS
CARDIOVASCULAR NEGATIVE: 1
CONSTITUTIONAL NEGATIVE: 1
GASTROINTESTINAL NEGATIVE: 1
RESPIRATORY NEGATIVE: 1
MUSCULOSKELETAL NEGATIVE: 1

## 2017-08-10 NOTE — PATIENT INSTRUCTIONS
Your provider has referred you to: FMG: [facility] [place] [phone]       Follow-up with your primary care provider next week and as needed.    Indications for emergent return to emergency department discussed with patient, who verbalized good understanding and agreement.  Patient understands the limitations of today's evaluation.         Male Reproductive Anatomy  The reproductive system is the part of the body involved in sexual function. Below are the main parts of the male reproductive anatomy.       The urethra transports urine and semen. When you have an orgasm, semen is ejaculated out of the urethra.  The testes are glands that produce sperm (reproductive cells) and hormones.  The epididymis is a coiled tube that holds sperm while they mature.  The vas deferens carry sperm from the epididymis to the penis.  After sexual arousal, sperm move away from the epididymis through the vas deferens. Along the way, the sperm mix with fluids produced by the seminal vesicles and prostate gland to form semen. The semen helps nourish sperm and carry them along.   Date Last Reviewed: 1/1/2017 2000-2017 [employer]. [address] [place] [address], [place] [address]. All rights reserved. This information is not intended as a substitute for professional medical care. Always follow your healthcare professional's instructions.

## 2017-08-10 NOTE — MR AVS SNAPSHOT
After Visit Summary   8/10/2017    Ashish Blas    MRN: [id number]           Patient Information     Date Of Birth          1980        Visit Information        Provider Department      8/10/2017 1:00 PM Michelle Lyn APRN [facility]        Today's Diagnoses     Burning sensation    -  1    Penis pain          Care Instructions    Your provider has referred you to: FMG: [facility] [phone]       Follow-up with your primary care provider next week and as needed.    Indications for emergent return to emergency department discussed with patient, who verbalized good understanding and agreement.  Patient understands the limitations of today's evaluation.         Male Reproductive Anatomy  The reproductive system is the part of the body involved in sexual function. Below are the main parts of the male reproductive anatomy.       The urethra transports urine and semen. When you have an orgasm, semen is ejaculated out of the urethra.  The testes are glands that produce sperm (reproductive cells) and hormones.  The epididymis is a coiled tube that holds sperm while they mature.  The vas deferens carry sperm from the epididymis to the penis.  After sexual arousal, sperm move away from the epididymis through the vas deferens. Along the way, the sperm mix with fluids produced by the seminal vesicles and prostate gland to form semen. The semen helps nourish sperm and carry them along.   Date Last Reviewed: 1/1/2017 2000-2017 [employer]. [address] [place] [address]. All rights reserved. This information is not intended as a substitute for professional medical care. Always follow your healthcare professional's instructions.                Follow-ups after your visit        Additional Services     UROLOGY ADULT REFERRAL       Your provider has referred you to: FMG: [facility] [phone]   [email or web address]    Please be aware that coverage of these services is subject to the terms and limitations of your health insurance plan.  Call member services at your health plan with any benefit or coverage questions.      Please bring the following with you to your appointment:    (1) Any X-Rays, CTs or MRIs which have been performed.  Contact the facility where they were done to arrange for  prior to your scheduled appointment.    (2) List of current medications  (3) This referral request   (4) Any documents/labs given to you for this referral                  Your next 10 appointments already scheduled     Aug 16, 2017 11:30 AM CDT   XR ESOPHAGRAM with UCXR2, UC GIGU RAD   [facility] Xray ([facility])    [address] [address] [phone]           Please bring a list of your current medicines to your exam. (Include vitamins, minerals and over-the-counter medicines.) Leave your valuables at home.  Tell the doctor if there is a chance you could be pregnant.  Do not eat for 8 hours before the exam. Keep drinking clear liquids until 2 hours before the exam.  You may take pain medicine (with a sip of water) up to 4 hours before the exam.  Do not swallow any other medicines unless your doctor tells you to. Talk to your doctor to be sure it s safe to stop your medicines.  Please call the Imaging Department at your exam site with any questions.            Aug 22, 2017   Procedure with Thor Angeles MD   [facility], [place], Endoscopy ([facility])    [address] [facility] [id number] [phone]           The [facility] is located on the corner of [facility] and [facility] on the [facility]. It is easily accessible from virtually any point in the [place] area, via [address] and [address].            Oct  02, 2017  7:00 AM CDT   (Arrive by 6:45 AM)   Return Visit with Rashaad Stockton PA-C   [facility] Gastroenterology and IBD ([facility])    [address] [address] [phone]              Who to contact     If you have questions or need follow up information about today's clinic visit or your schedule please contact [facility] directly at [phone].  Normal or non-critical lab and imaging results will be communicated to you by [employer], letter or phone within 4 business days after the clinic has received the results. If you do not hear from us within 7 days, please contact the clinic through [employer] or phone. If you have a critical or abnormal lab result, we will notify you by phone as soon as possible.  Submit refill requests through [employer] or call your pharmacy and they will forward the refill request to us. Please allow 3 business days for your refill to be completed.          Additional Information About Your Visit        [employer] Information     [employer] gives you secure access to your electronic health record. If you see a primary care provider, you can also send messages to your care team and make appointments. If you have questions, please call your primary care clinic.  If you do not have a primary care provider, please call [phone] and they will assist you.        Care EveryWhere ID     This is your Care EveryWhere ID. This could be used by other organizations to access your [facility] medical records  [id number]        Your Vitals Were     Pulse Temperature BMI (Body Mass Index)             62 97.6  F (36.4  C) (Tympanic) 25.63 kg/m2          Blood Pressure from Last 3 Encounters:   08/10/17 124/79   08/09/17 122/77   07/31/17 132/81    Weight from Last 3 Encounters:   08/10/17 189 lb (85.7 kg)   08/09/17 177 lb (80.3 kg)   07/31/17 180 lb 14.4 oz (82.1 kg)              We Performed the Following     *UA reflex to  Microscopic and Culture ([facility] [facility] (except [place] and [place])     Chlamydia trachomatis PCR     Neisseria gonorrhoeae PCR     UROLOGY ADULT REFERRAL        Primary Care Provider Office Phone # Fax #    Kimmie Jennifer Pack [phone] [address] [address] [facility] [address]        Equal Access to Services     SHANI CHEUNG : Hadii aad ku hadasho Soomaali, waaxda luqadaha, qaybta kaalmada adeegyada, waxay idiin hayaan adeeg kharash lacindyn . So [facility] [phone].    ATENCIÓN: Si habla español, tiene a powell disposición servicios gratuitos de asistencia lingüística. Llame al [phone].    We comply with applicable federal civil rights laws and [place] laws. We do not discriminate on the basis of race, color, national origin, age, disability sex, sexual orientation or gender identity.            Thank you!     Thank you for choosing [facility]  for your care. Our goal is always to provide you with excellent care. Hearing back from our patients is one way we can continue to improve our services. Please take a few minutes to complete the written survey that you may receive in the mail after your visit with us. Thank you!             Your Updated Medication List - Protect others around you: Learn how to safely use, store and throw away your medicines at www.disposemymeds.org.          This list is accurate as of: 8/10/17  1:59 PM.  Always use your most recent med list.                   Brand Name Dispense Instructions for use Diagnosis    ALPRAZolam 0.25 MG tablet    XANAX    20 tablet    Take 1 tablet (0.25 mg) by mouth 3 times daily as needed for anxiety    Anxiety       Multi-vitamin Tabs tablet   Generic drug:  multivitamin, therapeutic with minerals      1 TABLET ORALLY DAILY        omeprazole 40 MG capsule    priLOSEC    90 capsule    Take 1 capsule (40 mg) by mouth daily Take 30-60 minutes before a meal.    Gastroesophageal reflux disease,  esophagitis presence not specified       sertraline 100 MG tablet    ZOLOFT     TAKE ONE TABLET BY MOUTH ONE TIME DAILY        TYLENOL PO           VITAMIN B COMPLEX PO      1 TABLET ORALLY DAILY

## 2017-08-10 NOTE — NURSING NOTE
Chief Complaint   Patient presents with     Penis/Scrotum Problem       Initial /79 (BP Location: Right arm, Cuff Size: Adult Large)  Pulse 62  Temp 97.6  F (36.4  C) (Tympanic)  Wt 189 lb (85.7 kg)  BMI 25.63 kg/m2 Estimated body mass index is 25.63 kg/(m^2) as calculated from the following:    Height as of 8/9/17: 6' (1.829 m).    Weight as of this encounter: 189 lb (85.7 kg).  Medication Reconciliation: complete    Health Maintenance that is potentially due pending provider review:  NONE    n/a    Is there anyone who you would like to be able to receive your results? Not Applicable  If yes have patient fill out MARIBEL Bacon M.A.

## 2017-08-10 NOTE — PROGRESS NOTES
SUBJECTIVE:                                                    Ashish Blas is a 36 year old male who presents to clinic today for the following health issues:    Concern - burning on penis tip   Onset: 2-3 months     Description:   Patient describes having a burning and stinging pain on tip of penis.  It burns with urination and also just all the time.  Will go away for about 3 weeks then come back.     Intensity: mild    Progression of Symptoms:  same    Accompanying Signs & Symptoms:  No discharge, clear urine. no discoloration, or swelling.      Previous history of similar problem:   Maybe when was 21, though cant remember     Precipitating factors:   Worsened by: None  No new partners.     Alleviating factors:  Improved by: NA    Therapies Tried and outcome: Pushing fluids, cranberry supplement.       Problem list and histories reviewed & adjusted, as indicated.  Additional history: as documented    Patient Active Problem List   Diagnosis     Esophageal reflux     Anxiety     Mixed hyperlipidemia     Past Surgical History:   Procedure Laterality Date     ABDOMEN SURGERY  08/1/2009    niacin     APPENDECTOMY  10/15/2009    age 30 approx     COLONOSCOPY  2/17/2014    Procedure: COLONOSCOPY;  Colonoscopy  ;  Surgeon: Abdon Pagan MD;  Location: [facility]     COLONOSCOPY N/A 5/11/2016    Procedure: COLONOSCOPY;  Surgeon: Christian Malagon MD;  Location: [facility]     ENDOSCOPY      many since he was 18     procedure for acid reflux  08/15/2009       Social History   Substance Use Topics     Smoking status: Former Smoker     Years: 10.00     Types: Cigarettes     Start date: 7/19/2016     Quit date: 1/2/2017     Smokeless tobacco: Never Used      Comment: Quit date is 5/30/2016     Alcohol use Yes      Comment: occ     Family History   Problem Relation Age of Onset     Hypertension Mother      DIABETES Mother      Lipids Mother      Hyperlipidemia Mother      Lipids Father      CANCER Father      Non Hodgkins  Lymphoma     Hyperlipidemia Father      DIABETES Maternal Grandmother      Hypertension Maternal Grandmother      CEREBROVASCULAR DISEASE Maternal Grandmother      70     CANCER Maternal Grandmother      rectal cancer     HEART DISEASE Maternal Grandfather      later 50s     Breast Cancer Paternal Grandmother      Lipids Brother      CANCER Sister      skin cancer     Cancer - colorectal Brother 37     Hypothyroidism Brother      Thyroid Disease Brother          Current Outpatient Prescriptions   Medication Sig Dispense Refill     sertraline (ZOLOFT) 100 MG tablet TAKE ONE TABLET BY MOUTH ONE TIME DAILY       ALPRAZolam (XANAX) 0.25 MG tablet Take 1 tablet (0.25 mg) by mouth 3 times daily as needed for anxiety 20 tablet 0     omeprazole (PRILOSEC) 40 MG capsule Take 1 capsule (40 mg) by mouth daily Take 30-60 minutes before a meal. 90 capsule 3     Acetaminophen (TYLENOL PO)        MULTI-VITAMIN OR TABS 1 TABLET ORALLY DAILY       VITAMIN B COMPLEX OR 1 TABLET ORALLY DAILY       No Known Allergies      Reviewed and updated as needed this visit by clinical staff     Reviewed and updated as needed this visit by Provider         ROS:  Constitutional, HEENT, cardiovascular, pulmonary, gi and gu systems are negative, except as otherwise noted.      OBJECTIVE:   /79 (BP Location: Right arm, Cuff Size: Adult Large)  Pulse 62  Temp 97.6  F (36.4  C) (Tympanic)  Wt 189 lb (85.7 kg)  BMI 25.63 kg/m2  Body mass index is 25.63 kg/(m^2).   GENERAL: healthy, alert and no distress  EYES: Eyes grossly normal to inspection, PERRL and conjunctivae and sclerae normal  HENT: ear canals and TM's normal, nose and mouth without ulcers or lesions  NECK: no adenopathy, no asymmetry, masses, or scars and thyroid normal to palpation  RESP: lungs clear to auscultation - no rales, rhonchi or wheezes  CV: regular rate and rhythm, normal S1 S2, no S3 or S4, no murmur, click or rub, no peripheral edema and peripheral pulses strong    (male): normal male genitalia without lesions or urethral discharge, no hernia  MS: no gross musculoskeletal defects noted, no edema  SKIN: no suspicious lesions or rashes  NEURO: Normal strength and tone, mentation intact and speech normal  PSYCH: mentation appears normal, affect normal/bright    Results for orders placed or performed in visit on 08/10/17   *UA reflex to Microscopic and Culture ([facility] (except [place] and [place])   Result Value Ref Range    Color Urine Yellow     Appearance Urine Clear     Glucose Urine Negative NEG mg/dL    Bilirubin Urine Negative NEG    Ketones Urine 40 (A) NEG mg/dL    Specific Gravity Urine 1.010 1.003 - 1.035    Blood Urine Negative NEG    pH Urine 6.0 5.0 - 7.0 pH    Protein Albumin Urine Negative NEG mg/dL    Urobilinogen Urine 0.2 0.2 - 1.0 EU/dL    Nitrite Urine Negative NEG    Leukocyte Esterase Urine Negative NEG    Source Midstream Urine    Chlamydia trachomatis PCR   Result Value Ref Range    Specimen Description Urine     Chlamydia Trachomatis PCR  NEG     Negative   Negative for C. trachomatis rRNA by transcription mediated amplification.   A negative result by transcription mediated amplification does not preclude the   presence of C. trachomatis infection because results are dependent on proper   and adequate collection, absence of inhibitors, and sufficient rRNA to be   detected.     Neisseria gonorrhoeae PCR   Result Value Ref Range    Specimen Descrip Urine     N Gonorrhea PCR  NEG     Negative   Negative for N. gonorrhoeae rRNA by transcription mediated amplification.   A negative result by transcription mediated amplification does not preclude the   presence of N. gonorrhoeae infection because results are dependent on proper   and adequate collection, absence of inhibitors, and sufficient rRNA to be   detected.           ASSESSMENT:       ICD-10-CM    1. Penis pain N48.89 UROLOGY ADULT REFERRAL   2. Burning sensation R20.8 *UA reflex  to Microscopic and Culture ([facility] [facility] (except [place] and [place])     Chlamydia trachomatis PCR     Neisseria gonorrhoeae PCR     UROLOGY ADULT REFERRAL       PLAN:   Test results negative has had increased penis pain to the head of the penis been present for 2 month,  testicle exam negative.  Will have him follow-up with Urology.    Patient Instructions     Your provider has referred you to: FMG: [facility] [place] [phone]       Follow-up with your primary care provider next week and as needed.    Indications for emergent return to emergency department discussed with patient, who verbalized good understanding and agreement.  Patient understands the limitations of today's evaluation.         Male Reproductive Anatomy  The reproductive system is the part of the body involved in sexual function. Below are the main parts of the male reproductive anatomy.       The urethra transports urine and semen. When you have an orgasm, semen is ejaculated out of the urethra.  The testes are glands that produce sperm (reproductive cells) and hormones.  The epididymis is a coiled tube that holds sperm while they mature.  The vas deferens carry sperm from the epididymis to the penis.  After sexual arousal, sperm move away from the epididymis through the vas deferens. Along the way, the sperm mix with fluids produced by the seminal vesicles and prostate gland to form semen. The semen helps nourish sperm and carry them along.   Date Last Reviewed: 1/1/2017 2000-2017 [employer]. [address] [place] [address]. All rights reserved. This information is not intended as a substitute for professional medical care. Always follow your healthcare professional's instructions.            MARTHA Almaraz [facility]

## 2017-08-10 NOTE — LETTER
Ashish Blas  [address] [facility] [address]        August 14, 2017          Dear ,    We are writing to inform you of your test results.    Your recent chlamydia and gonorrhea tests were negative.    Resulted Orders   *UA reflex to Microscopic and Culture ([facility] (except [place] and [place])   Result Value Ref Range    Color Urine Yellow     Appearance Urine Clear     Glucose Urine Negative NEG mg/dL    Bilirubin Urine Negative NEG    Ketones Urine 40 (A) NEG mg/dL    Specific Gravity Urine 1.010 1.003 - 1.035    Blood Urine Negative NEG    pH Urine 6.0 5.0 - 7.0 pH    Protein Albumin Urine Negative NEG mg/dL    Urobilinogen Urine 0.2 0.2 - 1.0 EU/dL    Nitrite Urine Negative NEG    Leukocyte Esterase Urine Negative NEG    Source Midstream Urine    Chlamydia trachomatis PCR   Result Value Ref Range    Specimen Description Urine     Chlamydia Trachomatis PCR  NEG     Negative   Negative for C. trachomatis rRNA by transcription mediated amplification.   A negative result by transcription mediated amplification does not preclude the   presence of C. trachomatis infection because results are dependent on proper   and adequate collection, absence of inhibitors, and sufficient rRNA to be   detected.     Neisseria gonorrhoeae PCR   Result Value Ref Range    Specimen Descrip Urine     N Gonorrhea PCR  NEG     Negative   Negative for N. gonorrhoeae rRNA by transcription mediated amplification.   A negative result by transcription mediated amplification does not preclude the   presence of N. gonorrhoeae infection because results are dependent on proper   and adequate collection, absence of inhibitors, and sufficient rRNA to be   detected.         If you have any questions or concerns, please call the clinic at the number listed above.       Sincerely,    Michelle Lyn, APRN CNP/ ss

## 2017-08-11 LAB
C TRACH DNA SPEC QL NAA+PROBE: NORMAL
N GONORRHOEA DNA SPEC QL NAA+PROBE: NORMAL
SPECIMEN SOURCE: NORMAL
SPECIMEN SOURCE: NORMAL

## 2017-12-06 ENCOUNTER — MYC REFILL (OUTPATIENT)
Dept: FAMILY MEDICINE | Facility: CLINIC | Age: 37
End: 2017-12-06

## 2017-12-06 DIAGNOSIS — F41.9 ANXIETY: ICD-10-CM

## 2017-12-06 NOTE — TELEPHONE ENCOUNTER
Message from [employer]:  Original authorizing provider: MD Ashish Rick would like a refill of the following medications:  ALPRAZolam (XANAX) 0.25 MG tablet [Kimmie Pack MD]    Preferred pharmacy: [id number] [facility] [address] [facility]    Comment:  Good Morning, I would like a refill on this if possible. I use it sometimes in need but not to often. Thank you, Ashish Blas

## 2017-12-08 RX ORDER — ALPRAZOLAM 0.25 MG
0.25 TABLET ORAL 3 TIMES DAILY PRN
Qty: 20 TABLET | Refills: 0 | Status: SHIPPED | OUTPATIENT
Start: 2017-12-08 | End: 2018-01-24

## 2018-01-24 ENCOUNTER — MYC REFILL (OUTPATIENT)
Dept: FAMILY MEDICINE | Facility: CLINIC | Age: 38
End: 2018-01-24

## 2018-01-24 DIAGNOSIS — F41.9 ANXIETY: ICD-10-CM

## 2018-01-24 NOTE — TELEPHONE ENCOUNTER
Dr. Pack,    Patient sent a my chart asking for refill of Xanax.  Please advise. Poornima BROWN RN

## 2018-01-24 NOTE — TELEPHONE ENCOUNTER
Message from [employer]:  Original authorizing provider: MD Ashish Rick would like a refill of the following medications:  ALPRAZolam (XANAX) 0.25 MG tablet [Kimime Pack MD]    Preferred pharmacy: [facility] #[id number] [facility] [address]    Comment:  Hi, I would like to get a refill on this med. I only have 2 left. I'm going to schedule an appointment to get recheck. My anxiety has been pretty bad lately. Thank you, Ashish LINDA

## 2018-01-25 RX ORDER — ALPRAZOLAM 0.25 MG
0.25 TABLET ORAL 3 TIMES DAILY PRN
Qty: 20 TABLET | Refills: 0 | Status: SHIPPED | OUTPATIENT
Start: 2018-01-25 | End: 2018-12-07

## 2018-01-25 NOTE — TELEPHONE ENCOUNTER
Prescription printed please fax and notify patient.  Please advise I think follow-up would be good.  I would recommend adjusting his baseline medication for anxiety, sertraline.  We can discuss options at his office visit.

## 2018-01-26 NOTE — TELEPHONE ENCOUNTER
[facility] faxed the Xanax script now. Left message to call back to give remainder of message, patient has appointment on 2-2-18, will let the patient know Kimmie's recommendation once calls back.    HANNA Garcia

## 2018-02-15 ENCOUNTER — OFFICE VISIT (OUTPATIENT)
Dept: FAMILY MEDICINE | Facility: CLINIC | Age: 38
End: 2018-02-15
Payer: COMMERCIAL

## 2018-02-15 VITALS
WEIGHT: 174.6 LBS | SYSTOLIC BLOOD PRESSURE: 131 MMHG | HEIGHT: 72 IN | BODY MASS INDEX: 23.65 KG/M2 | HEART RATE: 78 BPM | TEMPERATURE: 98.2 F | DIASTOLIC BLOOD PRESSURE: 81 MMHG

## 2018-02-15 DIAGNOSIS — R19.7 DIARRHEA, UNSPECIFIED TYPE: Primary | ICD-10-CM

## 2018-02-15 LAB
BASOPHILS # BLD AUTO: 0 10E9/L (ref 0–0.2)
BASOPHILS NFR BLD AUTO: 0.5 %
DIFFERENTIAL METHOD BLD: NORMAL
EOSINOPHIL # BLD AUTO: 0.2 10E9/L (ref 0–0.7)
EOSINOPHIL NFR BLD AUTO: 2.4 %
ERYTHROCYTE [DISTWIDTH] IN BLOOD BY AUTOMATED COUNT: 12 % (ref 10–15)
HCT VFR BLD AUTO: 42.1 % (ref 40–53)
HGB BLD-MCNC: 14.3 G/DL (ref 13.3–17.7)
LYMPHOCYTES # BLD AUTO: 1.8 10E9/L (ref 0.8–5.3)
LYMPHOCYTES NFR BLD AUTO: 29 %
MCH RBC QN AUTO: 31.6 PG (ref 26.5–33)
MCHC RBC AUTO-ENTMCNC: 34 G/DL (ref 31.5–36.5)
MCV RBC AUTO: 93 FL (ref 78–100)
MONOCYTES # BLD AUTO: 1 10E9/L (ref 0–1.3)
MONOCYTES NFR BLD AUTO: 15.6 %
NEUTROPHILS # BLD AUTO: 3.3 10E9/L (ref 1.6–8.3)
NEUTROPHILS NFR BLD AUTO: 52.5 %
PLATELET # BLD AUTO: 236 10E9/L (ref 150–450)
RBC # BLD AUTO: 4.53 10E12/L (ref 4.4–5.9)
WBC # BLD AUTO: 6.2 10E9/L (ref 4–11)

## 2018-02-15 PROCEDURE — 87506 IADNA-DNA/RNA PROBE TQ 6-11: CPT | Performed by: PHYSICIAN ASSISTANT

## 2018-02-15 PROCEDURE — 85025 COMPLETE CBC W/AUTO DIFF WBC: CPT | Performed by: PHYSICIAN ASSISTANT

## 2018-02-15 PROCEDURE — 80053 COMPREHEN METABOLIC PANEL: CPT | Performed by: PHYSICIAN ASSISTANT

## 2018-02-15 PROCEDURE — 99213 OFFICE O/P EST LOW 20 MIN: CPT | Performed by: PHYSICIAN ASSISTANT

## 2018-02-15 PROCEDURE — 36415 COLL VENOUS BLD VENIPUNCTURE: CPT | Performed by: PHYSICIAN ASSISTANT

## 2018-02-15 ASSESSMENT — ENCOUNTER SYMPTOMS
VOMITING: 0
SEIZURES: 0
PHOTOPHOBIA: 0
EYE REDNESS: 0
SENSORY CHANGE: 0
MYALGIAS: 0
DYSURIA: 0
PALPITATIONS: 0
FREQUENCY: 0
WEIGHT LOSS: 0
TINGLING: 0
COUGH: 0
BACK PAIN: 0
NECK PAIN: 0
EYE PAIN: 0
WHEEZING: 0
BLURRED VISION: 0
HEADACHES: 0
HALLUCINATIONS: 0
ORTHOPNEA: 0
LOSS OF CONSCIOUSNESS: 0
EYE DISCHARGE: 0
NEUROLOGICAL NEGATIVE: 1
DEPRESSION: 0
NERVOUS/ANXIOUS: 0
DIAPHORESIS: 0
WEAKNESS: 0
SHORTNESS OF BREATH: 0
SPUTUM PRODUCTION: 0
DOUBLE VISION: 0
HEMOPTYSIS: 0
HEARTBURN: 0
NAUSEA: 0
FEVER: 0
SORE THROAT: 0
INSOMNIA: 0
CONSTIPATION: 0
FOCAL WEAKNESS: 0
DIARRHEA: 1
DIZZINESS: 0
BLOOD IN STOOL: 0
ABDOMINAL PAIN: 0

## 2018-02-15 ASSESSMENT — LIFESTYLE VARIABLES: SUBSTANCE_ABUSE: 0

## 2018-02-15 NOTE — NURSING NOTE
Chief Complaint   Patient presents with     Diarrhea     Generalized Body Aches       Initial /81 (BP Location: Right arm, Patient Position: Sitting, Cuff Size: Adult Large)  Pulse 78  Temp 98.2  F (36.8  C) (Tympanic)  Ht 6' (1.829 m)  Wt 174 lb 9.6 oz (79.2 kg)  BMI 23.68 kg/m2 Estimated body mass index is 23.68 kg/(m^2) as calculated from the following:    Height as of this encounter: 6' (1.829 m).    Weight as of this encounter: 174 lb 9.6 oz (79.2 kg).      Health Maintenance that is potentially due pending provider review:  NONE    n/a    Is there anyone who you would like to be able to receive your results? No  If yes have patient fill out MARIBEL ALLEN CMA

## 2018-02-15 NOTE — MR AVS SNAPSHOT
After Visit Summary   2/15/2018    Ashish Blas    MRN: [id number]           Patient Information     Date Of Birth          1980        Visit Information        Provider Department      2/15/2018 1:20 PM Layo Smith PA-C [facility]        Today's Diagnoses     Diarrhea, unspecified type    -  1       Follow-ups after your visit        Follow-up notes from your care team     Return if symptoms worsen or fail to improve.      Who to contact     If you have questions or need follow up information about today's clinic visit or your schedule please contact [facility] directly at [phone].  Normal or non-critical lab and imaging results will be communicated to you by Keep Me Certifiedhart, letter or phone within 4 business days after the clinic has received the results. If you do not hear from us within 7 days, please contact the clinic through Keep Me Certifiedhart or phone. If you have a critical or abnormal lab result, we will notify you by phone as soon as possible.  Submit refill requests through [employer] or call your pharmacy and they will forward the refill request to us. Please allow 3 business days for your refill to be completed.          Additional Information About Your Visit        MyChart Information     [employer] gives you secure access to your electronic health record. If you see a primary care provider, you can also send messages to your care team and make appointments. If you have questions, please call your primary care clinic.  If you do not have a primary care provider, please call [phone] and they will assist you.        Care EveryWhere ID     This is your Care EveryWhere ID. This could be used by other organizations to access your [facility] medical records  [id number]        Your Vitals Were     Pulse Temperature Height BMI (Body Mass Index)          78 98.2  F (36.8  C) (Tympanic) 6' (1.829 m) 23.68 kg/m2         Blood Pressure from Last 3 Encounters:    02/15/18 131/81   08/10/17 124/79   08/09/17 122/77    Weight from Last 3 Encounters:   02/15/18 174 lb 9.6 oz (79.2 kg)   08/10/17 189 lb (85.7 kg)   08/09/17 177 lb (80.3 kg)              We Performed the Following     CBC with platelets differential     Comprehensive metabolic panel (BMP + Alb, Alk Phos, ALT, AST, Total. Bili, TP)        Primary Care Provider Office Phone # Fax #    Kimmie Jennifer Pack [phone] [address] [facility] [address]        Equal Access to Services     PABLO CHEUNG : Hadii demian mott hadasho Sojohnny, waaxda luqadaha, qaybta kaalmada adeemelinayada, jane nolan. So [facility] [phone].    ATENCIÓN: Si habla español, tiene a powell disposición servicios gratuitos de asistencia lingüística. Llame al [phone].    We comply with applicable federal civil rights laws and [place] laws. We do not discriminate on the basis of race, color, national origin, age, disability, sex, sexual orientation, or gender identity.            Thank you!     Thank you for choosing [facility]  for your care. Our goal is always to provide you with excellent care. Hearing back from our patients is one way we can continue to improve our services. Please take a few minutes to complete the written survey that you may receive in the mail after your visit with us. Thank you!             Your Updated Medication List - Protect others around you: Learn how to safely use, store and throw away your medicines at www.disposemymeds.org.          This list is accurate as of 2/15/18  2:06 PM.  Always use your most recent med list.                   Brand Name Dispense Instructions for use Diagnosis    ALPRAZolam 0.25 MG tablet    XANAX    20 tablet    Take 1 tablet (0.25 mg) by mouth 3 times daily as needed for anxiety    Anxiety       Multi-vitamin Tabs tablet   Generic drug:  multivitamin, therapeutic with minerals      1 TABLET ORALLY DAILY         omeprazole 40 MG capsule    priLOSEC    90 capsule    Take 1 capsule (40 mg) by mouth daily Take 30-60 minutes before a meal.    Gastroesophageal reflux disease, esophagitis presence not specified       sertraline 100 MG tablet    ZOLOFT     TAKE ONE TABLET BY MOUTH ONE TIME DAILY        TYLENOL PO           VITAMIN B COMPLEX PO      1 TABLET ORALLY DAILY

## 2018-02-15 NOTE — LETTER
[facility]  [address] [place] [address]  Phone: [phone]  Fax: [phone]    February 15, 2018        Ashish Blas  [address]   [place] [address]          To whom it may concern:    RE: Ashish Blas    Patient was seen and treated today at our clinic and missed work 2/14-2/16/18 due to illness      Please contact me for questions or concerns.      Sincerely,        Layo Smith PA-C

## 2018-02-15 NOTE — PROGRESS NOTES
HPI    SUBJECTIVE:   Ashish Blas is a 37 year old male who presents to clinic today for 3 day history of diarrhea and nausea.  He has tried antidiarrheal medications with no improvement.  He needs a work note.  He is concerned about dehydration as he has not been drinking fluids well because that seems to increase his diarrhea.  I discussed the importance of fluids and he is going to increase fluids    Stomach flu       Duration: 3 days     Description (location/character/radiation): Patient states that for 3 days he has had diarrhea, fever, body aches     Intensity:  moderate    Accompanying signs and symptoms: none    History (similar episodes/previous evaluation): None    Precipitating or alleviating factors: None    Therapies tried and outcome: None     Problem list and histories reviewed & adjusted, as indicated.  Additional history: as documented    Patient Active Problem List   Diagnosis     Esophageal reflux     Anxiety     Mixed hyperlipidemia     Past Surgical History:   Procedure Laterality Date     ABDOMEN SURGERY  08/1/2009    niacin     APPENDECTOMY  10/15/2009    age 30 approx     COLONOSCOPY  2/17/2014    Procedure: COLONOSCOPY;  Colonoscopy  ;  Surgeon: Abdon Pagan MD;  Location: [facility]     COLONOSCOPY N/A 5/11/2016    Procedure: COLONOSCOPY;  Surgeon: Christian Malagon MD;  Location: [facility]     ENDOSCOPY      many since he was 18     procedure for acid reflux  08/15/2009       Social History   Substance Use Topics     Smoking status: Former Smoker     Years: 10.00     Types: Cigarettes     Start date: 7/19/2016     Quit date: 1/2/2017     Smokeless tobacco: Never Used      Comment: Quit date is 5/30/2016     Alcohol use Yes      Comment: occ     Family History   Problem Relation Age of Onset     Hypertension Mother      DIABETES Mother      Lipids Mother      Hyperlipidemia Mother      Lipids Father      CANCER Father      Non Hodgkins Lymphoma     Hyperlipidemia Father      DIABETES  Maternal Grandmother      Hypertension Maternal Grandmother      CEREBROVASCULAR DISEASE Maternal Grandmother      70     CANCER Maternal Grandmother      rectal cancer     HEART DISEASE Maternal Grandfather      later 50s     Breast Cancer Paternal Grandmother      Lipids Brother      CANCER Sister      skin cancer     Cancer - colorectal Brother 37     Hypothyroidism Brother      Thyroid Disease Brother          Current Outpatient Prescriptions   Medication Sig Dispense Refill     ALPRAZolam (XANAX) 0.25 MG tablet Take 1 tablet (0.25 mg) by mouth 3 times daily as needed for anxiety 20 tablet 0     sertraline (ZOLOFT) 100 MG tablet TAKE ONE TABLET BY MOUTH ONE TIME DAILY       omeprazole (PRILOSEC) 40 MG capsule Take 1 capsule (40 mg) by mouth daily Take 30-60 minutes before a meal. 90 capsule 3     Acetaminophen (TYLENOL PO)        MULTI-VITAMIN OR TABS 1 TABLET ORALLY DAILY       VITAMIN B COMPLEX OR 1 TABLET ORALLY DAILY       No Known Allergies  Labs reviewed in EPIC    Reviewed and updated as needed this visit by clinical staff  Tobacco  Allergies  Meds  Med Hx  Surg Hx  Fam Hx  Soc Hx      Reviewed and updated as needed this visit by Provider       Review of Systems   Constitutional: Negative for diaphoresis, fever, malaise/fatigue and weight loss.   HENT: Negative for congestion, ear discharge, ear pain, hearing loss, nosebleeds and sore throat.    Eyes: Negative for blurred vision, double vision, photophobia, pain, discharge and redness.   Respiratory: Negative for cough, hemoptysis, sputum production, shortness of breath and wheezing.    Cardiovascular: Negative for chest pain, palpitations, orthopnea and leg swelling.   Gastrointestinal: Positive for diarrhea. Negative for abdominal pain, blood in stool, constipation, heartburn, melena, nausea and vomiting.   Genitourinary: Negative.  Negative for dysuria, frequency and urgency.   Musculoskeletal: Negative for back pain, joint pain, myalgias and  neck pain.   Skin: Negative for itching and rash.   Neurological: Negative.  Negative for dizziness, tingling, sensory change, focal weakness, seizures, loss of consciousness, weakness and headaches.   Endo/Heme/Allergies: Negative.    Psychiatric/Behavioral: Negative for depression, hallucinations, substance abuse and suicidal ideas. The patient is not nervous/anxious and does not have insomnia.          Physical Exam   Constitutional: He is oriented to person, place, and time and well-developed, well-nourished, and in no distress.   HENT:   Head: Normocephalic and atraumatic.   Right Ear: External ear normal.   Left Ear: External ear normal.   Nose: Nose normal.   Mouth/Throat: Oropharynx is clear and moist.   Eyes: Conjunctivae and EOM are normal. Pupils are equal, round, and reactive to light. Right eye exhibits no discharge. Left eye exhibits no discharge. No scleral icterus.   Neck: Normal range of motion. Neck supple. No thyromegaly present.   Cardiovascular: Normal rate, regular rhythm, normal heart sounds and intact distal pulses.  Exam reveals no gallop and no friction rub.    No murmur heard.  Pulmonary/Chest: Effort normal and breath sounds normal. No respiratory distress. He has no wheezes. He has no rales. He exhibits no tenderness.   Abdominal: Soft. Bowel sounds are normal. He exhibits no distension and no mass. There is no tenderness. There is no rebound and no guarding.   Musculoskeletal: Normal range of motion. He exhibits no edema or tenderness.   Lymphadenopathy:     He has no cervical adenopathy.   Neurological: He is alert and oriented to person, place, and time. He has normal reflexes. No cranial nerve deficit. He exhibits normal muscle tone. Gait normal. Coordination normal.   Skin: Skin is warm and dry. No rash noted. No erythema.   Psychiatric: Mood, memory, affect and judgment normal.         (R19.7) Diarrhea, unspecified type  (primary encounter diagnosis)  Comment:   Plan: Enteric  Bacteria and Virus Panel by CRISELDA Stool,         CBC with platelets differential, Comprehensive         metabolic panel (BMP + Alb, Alk Phos, ALT, AST,        Total. Bili, TP)         We will contact him with lab results and liquids will be increased and a work note was written

## 2018-02-16 LAB
ALBUMIN SERPL-MCNC: 4.3 G/DL (ref 3.4–5)
ALP SERPL-CCNC: 54 U/L (ref 40–150)
ALT SERPL W P-5'-P-CCNC: 43 U/L (ref 0–70)
ANION GAP SERPL CALCULATED.3IONS-SCNC: 5 MMOL/L (ref 3–14)
AST SERPL W P-5'-P-CCNC: 27 U/L (ref 0–45)
BILIRUB SERPL-MCNC: 0.4 MG/DL (ref 0.2–1.3)
BUN SERPL-MCNC: 8 MG/DL (ref 7–30)
C COLI+JEJUNI+LARI FUSA STL QL NAA+PROBE: NOT DETECTED
CALCIUM SERPL-MCNC: 9.1 MG/DL (ref 8.5–10.1)
CHLORIDE SERPL-SCNC: 102 MMOL/L (ref 94–109)
CO2 SERPL-SCNC: 28 MMOL/L (ref 20–32)
CREAT SERPL-MCNC: 0.63 MG/DL (ref 0.66–1.25)
EC STX1 GENE STL QL NAA+PROBE: NOT DETECTED
EC STX2 GENE STL QL NAA+PROBE: NOT DETECTED
ENTERIC PATHOGEN COMMENT: ABNORMAL
GFR SERPL CREATININE-BSD FRML MDRD: >90 ML/MIN/1.7M2
GLUCOSE SERPL-MCNC: 93 MG/DL (ref 70–99)
NOROV GI+II ORF1-ORF2 JNC STL QL NAA+PR: ABNORMAL
POTASSIUM SERPL-SCNC: 4 MMOL/L (ref 3.4–5.3)
PROT SERPL-MCNC: 8.2 G/DL (ref 6.8–8.8)
RVA NSP5 STL QL NAA+PROBE: NOT DETECTED
SALMONELLA SP RPOD STL QL NAA+PROBE: NOT DETECTED
SHIGELLA SP+EIEC IPAH STL QL NAA+PROBE: NOT DETECTED
SODIUM SERPL-SCNC: 135 MMOL/L (ref 133–144)
V CHOL+PARA RFBL+TRKH+TNAA STL QL NAA+PR: NOT DETECTED
Y ENTERO RECN STL QL NAA+PROBE: NOT DETECTED

## 2018-03-25 ENCOUNTER — OFFICE VISIT (OUTPATIENT)
Dept: URGENT CARE | Facility: URGENT CARE | Age: 38
End: 2018-03-25
Payer: COMMERCIAL

## 2018-03-25 VITALS
OXYGEN SATURATION: 98 % | BODY MASS INDEX: 25.04 KG/M2 | WEIGHT: 184.6 LBS | DIASTOLIC BLOOD PRESSURE: 78 MMHG | SYSTOLIC BLOOD PRESSURE: 147 MMHG | RESPIRATION RATE: 16 BRPM | TEMPERATURE: 96.1 F | HEART RATE: 76 BPM

## 2018-03-25 DIAGNOSIS — J01.90 ACUTE SINUSITIS WITH SYMPTOMS > 10 DAYS: Primary | ICD-10-CM

## 2018-03-25 PROCEDURE — 99213 OFFICE O/P EST LOW 20 MIN: CPT | Performed by: PHYSICIAN ASSISTANT

## 2018-03-25 RX ORDER — FLUTICASONE PROPIONATE 50 MCG
1 SPRAY, SUSPENSION (ML) NASAL
Qty: 1 BOTTLE | Refills: 11 | Status: SHIPPED | OUTPATIENT
Start: 2018-03-25 | End: 2018-08-12

## 2018-03-25 ASSESSMENT — ENCOUNTER SYMPTOMS
VOMITING: 0
UNEXPECTED WEIGHT CHANGE: 0
CONSTIPATION: 0
SINUS PAIN: 1
DIARRHEA: 0
EYE REDNESS: 0
FEVER: 0
CHILLS: 0
EYE PAIN: 0
EYE ITCHING: 0
RHINORRHEA: 1
SORE THROAT: 0
ABDOMINAL PAIN: 0
SINUS PRESSURE: 1
NAUSEA: 0
COUGH: 0
SHORTNESS OF BREATH: 0
PALPITATIONS: 0
EYE DISCHARGE: 0
FATIGUE: 0
MYALGIAS: 0
WHEEZING: 0
ARTHRALGIAS: 0

## 2018-03-25 NOTE — MR AVS SNAPSHOT
After Visit Summary   3/25/2018    Ashish Blas    MRN: [id number]           Patient Information     Date Of Birth          1980        Visit Information        Provider Department      3/25/2018 10:40 AM Estephania Flores PA-C [facility]        Today's Diagnoses     Acute sinusitis with symptoms > 10 days    -  1       Follow-ups after your visit        Follow-up notes from your care team     Return if symptoms worsen or fail to improve.      Who to contact     If you have questions or need follow up information about today's clinic visit or your schedule please contact [facility] directly at [phone].  Normal or non-critical lab and imaging results will be communicated to you by MyChart, letter or phone within 4 business days after the clinic has received the results. If you do not hear from us within 7 days, please contact the clinic through [email or web address] or phone. If you have a critical or abnormal lab result, we will notify you by phone as soon as possible.  Submit refill requests through [employer] or call your pharmacy and they will forward the refill request to us. Please allow 3 business days for your refill to be completed.          Additional Information About Your Visit        MyChart Information     [employer] gives you secure access to your electronic health record. If you see a primary care provider, you can also send messages to your care team and make appointments. If you have questions, please call your primary care clinic.  If you do not have a primary care provider, please call [phone] and they will assist you.        Care EveryWhere ID     This is your Care EveryWhere ID. This could be used by other organizations to access your [facility] medical records  [id number]        Your Vitals Were     Pulse Temperature Respirations Pulse Oximetry BMI (Body Mass Index)       76 96.1  F (35.6  C) (Tympanic) 16 98% 25.04  kg/m2        Blood Pressure from Last 3 Encounters:   03/25/18 147/78   02/15/18 131/81   08/10/17 124/79    Weight from Last 3 Encounters:   03/25/18 184 lb 9.6 oz (83.7 kg)   02/15/18 174 lb 9.6 oz (79.2 kg)   08/10/17 189 lb (85.7 kg)              Today, you had the following     No orders found for display         Today's Medication Changes          These changes are accurate as of 3/25/18 11:16 AM.  If you have any questions, ask your nurse or doctor.               Start taking these medicines.        Dose/Directions    amoxicillin-clavulanate 875-125 MG per tablet   Commonly known as:  AUGMENTIN   Used for:  Acute sinusitis with symptoms > 10 days   Started by:  Estephania Flores PA-C        Dose:  1 tablet   Take 1 tablet by mouth 2 times daily for 10 days   Quantity:  20 tablet   Refills:  0       fluticasone 50 MCG/ACT spray   Commonly known as:  FLONASE   Used for:  Acute sinusitis with symptoms > 10 days   Started by:  Estephania Flores PA-C        Dose:  1 spray   Spray 1 spray into both nostrils 2 times daily   Quantity:  1 Bottle   Refills:  11            Where to get your medicines      These medications were sent to [facility] #[id number] [address] [address]    Hours:  Closed 10-16-08 business to [facility] Phone:  [phone]     amoxicillin-clavulanate 875-125 MG per tablet    fluticasone 50 MCG/ACT spray                Primary Care Provider Office Phone # Fax #    Kimmie Pack [phone] [address] [facility] [address]        Equal Access to Services     [facility]: Nba Browning, sabine sher, nola dao, jane nolan. [facility] [phone].    ATENCIÓN: Si habla español, tiene a powell disposición servicios gratuitos de asistencia lingüística. Llame al [phone].    We comply with applicable federal civil rights laws and  [place] laws. We do not discriminate on the basis of race, color, national origin, age, disability, sex, sexual orientation, or gender identity.            Thank you!     Thank you for choosing [facility]  for your care. Our goal is always to provide you with excellent care. Hearing back from our patients is one way we can continue to improve our services. Please take a few minutes to complete the written survey that you may receive in the mail after your visit with us. Thank you!             Your Updated Medication List - Protect others around you: Learn how to safely use, store and throw away your medicines at www.disposemymeds.org.          This list is accurate as of 3/25/18 11:16 AM.  Always use your most recent med list.                   Brand Name Dispense Instructions for use Diagnosis    ALPRAZolam 0.25 MG tablet    XANAX    20 tablet    Take 1 tablet (0.25 mg) by mouth 3 times daily as needed for anxiety    Anxiety       amoxicillin-clavulanate 875-125 MG per tablet    AUGMENTIN    20 tablet    Take 1 tablet by mouth 2 times daily for 10 days    Acute sinusitis with symptoms > 10 days       fluticasone 50 MCG/ACT spray    FLONASE    1 Bottle    Spray 1 spray into both nostrils 2 times daily    Acute sinusitis with symptoms > 10 days       Multi-vitamin Tabs tablet   Generic drug:  multivitamin, therapeutic with minerals      1 TABLET ORALLY DAILY        omeprazole 40 MG capsule    priLOSEC    90 capsule    Take 1 capsule (40 mg) by mouth daily Take 30-60 minutes before a meal.    Gastroesophageal reflux disease, esophagitis presence not specified       sertraline 100 MG tablet    ZOLOFT     TAKE ONE TABLET BY MOUTH ONE TIME DAILY        TYLENOL PO           VITAMIN B COMPLEX PO      1 TABLET ORALLY DAILY

## 2018-03-25 NOTE — PROGRESS NOTES
SUBJECTIVE:   Ashish Blas is a 37 year old male presenting with a chief complaint of   Chief Complaint   Patient presents with     Otalgia     both ear pain, 1 week, sound sensitivity in both ears, jaw pain        He is an established patient of [facility].    Onset of symptoms was 1 week ago for ear pain and 1+ month for sinus drainage/pressure.  Course of illness is same.    Severity moderate  Current and Associated symptoms: ear pain bilateral  Treatment measures tried include Tylenol/Ibuprofen.  Predisposing factors include None.  No fever/chills    Review of Systems   Constitutional: Negative for chills, fatigue, fever and unexpected weight change.   HENT: Positive for ear pain, rhinorrhea, sinus pain and sinus pressure. Negative for congestion and sore throat.    Eyes: Negative for pain, discharge, redness and itching.   Respiratory: Negative for cough, shortness of breath and wheezing.    Cardiovascular: Negative for chest pain, palpitations and leg swelling.   Gastrointestinal: Negative for abdominal pain, constipation, diarrhea, nausea and vomiting.   Musculoskeletal: Negative for arthralgias and myalgias.   Skin: Negative for rash.       Past Medical History:   Diagnosis Date     Anxiety 2002     Esophageal reflux 1/15/17    Started around this time again.     Stomach problems     gas, burning sensation     Family History   Problem Relation Age of Onset     Hypertension Mother      DIABETES Mother      Lipids Mother      Hyperlipidemia Mother      Lipids Father      CANCER Father      Non Hodgkins Lymphoma     Hyperlipidemia Father      DIABETES Maternal Grandmother      Hypertension Maternal Grandmother      CEREBROVASCULAR DISEASE Maternal Grandmother      70     CANCER Maternal Grandmother      rectal cancer     HEART DISEASE Maternal Grandfather      later 50s     Breast Cancer Paternal Grandmother      Lipids Brother      CANCER Sister      skin cancer     Cancer - colorectal Brother 37      Hypothyroidism Brother      Thyroid Disease Brother      Current Outpatient Prescriptions   Medication Sig Dispense Refill     amoxicillin-clavulanate (AUGMENTIN) 875-125 MG per tablet Take 1 tablet by mouth 2 times daily for 10 days 20 tablet 0     fluticasone (FLONASE) 50 MCG/ACT spray Spray 1 spray into both nostrils 2 times daily 1 Bottle 11     ALPRAZolam (XANAX) 0.25 MG tablet Take 1 tablet (0.25 mg) by mouth 3 times daily as needed for anxiety 20 tablet 0     sertraline (ZOLOFT) 100 MG tablet TAKE ONE TABLET BY MOUTH ONE TIME DAILY       Acetaminophen (TYLENOL PO)        MULTI-VITAMIN OR TABS 1 TABLET ORALLY DAILY       VITAMIN B COMPLEX OR 1 TABLET ORALLY DAILY       omeprazole (PRILOSEC) 40 MG capsule Take 1 capsule (40 mg) by mouth daily Take 30-60 minutes before a meal. (Patient not taking: Reported on 3/25/2018) 90 capsule 3     Social History   Substance Use Topics     Smoking status: Former Smoker     Years: 10.00     Types: Cigarettes     Start date: 7/19/2016     Quit date: 1/2/2017     Smokeless tobacco: Never Used      Comment: Quit date is 5/30/2016     Alcohol use Yes      Comment: occ       OBJECTIVE  /78  Pulse 76  Temp 96.1  F (35.6  C) (Tympanic)  Resp 16  Wt 184 lb 9.6 oz (83.7 kg)  SpO2 98%  BMI 25.04 kg/m2    Physical Exam   Constitutional: He appears well-developed and well-nourished. No distress.   HENT:   Head: Normocephalic and atraumatic.   Right Ear: Tympanic membrane and ear canal normal.   Left Ear: Tympanic membrane and ear canal normal.   Nose: Mucosal edema and rhinorrhea present.   Mouth/Throat: Oropharynx is clear and moist.   Eyes: Conjunctivae are normal. Pupils are equal, round, and reactive to light.   Cardiovascular: Normal rate and regular rhythm.    Pulmonary/Chest: Effort normal and breath sounds normal.   Skin: Skin is warm and dry. No rash noted.   Psychiatric: He has a normal mood and affect. His behavior is normal.       Labs:  No results found for this  or any previous visit (from the past 24 hour(s)).    X-Ray was not done.    ASSESSMENT:      ICD-10-CM    1. Acute sinusitis with symptoms > 10 days J01.90 amoxicillin-clavulanate (AUGMENTIN) 875-125 MG per tablet     fluticasone (FLONASE) 50 MCG/ACT spray        Medical Decision Making:    Differential Diagnosis:  URI Adult/Peds:  Acute right otitis media, Acute left otitis media and Sinusitis    Serious Comorbid Conditions:  Adult:  None    PLAN:    URI Adult:  RX sinusitis  Augmentin 875 bid x 10 days, would also recommend flonase two times daily, continue with supportive care    Followup:    If not improving or if condition worsens, follow up with your Primary Care Provider    There are no Patient Instructions on file for this visit.

## 2018-05-04 ENCOUNTER — OFFICE VISIT (OUTPATIENT)
Dept: FAMILY MEDICINE | Facility: CLINIC | Age: 38
End: 2018-05-04
Payer: COMMERCIAL

## 2018-05-04 VITALS
TEMPERATURE: 98.9 F | WEIGHT: 172 LBS | RESPIRATION RATE: 16 BRPM | BODY MASS INDEX: 23.3 KG/M2 | DIASTOLIC BLOOD PRESSURE: 80 MMHG | HEIGHT: 72 IN | SYSTOLIC BLOOD PRESSURE: 130 MMHG | HEART RATE: 71 BPM

## 2018-05-04 DIAGNOSIS — K13.0 DRY LIPS: Primary | ICD-10-CM

## 2018-05-04 DIAGNOSIS — E78.2 MIXED HYPERLIPIDEMIA: ICD-10-CM

## 2018-05-04 DIAGNOSIS — R00.2 PALPITATIONS: ICD-10-CM

## 2018-05-04 PROCEDURE — 99214 OFFICE O/P EST MOD 30 MIN: CPT | Performed by: NURSE PRACTITIONER

## 2018-05-04 NOTE — MR AVS SNAPSHOT
After Visit Summary   5/4/2018    Ashish Blas    MRN: [id number]           Patient Information     Date Of Birth          1980        Visit Information        Provider Department      5/4/2018 12:40 PM Dina Gaviria APRN CNP [facility]        Today's Diagnoses     Dry lips    -  1    Palpitations        Mixed hyperlipidemia          Care Instructions    Try not to use any topicals on your lips and see what happens.  Return for fasting labs and will be notified of those results.  If palpitations worsen, would recommend EKG and Holter monitor.  Follow up if symptoms persist or worsen and as needed.        Thank you for choosing [facility].  You may be receiving a survey in the mail from [email or web address] regarding your visit today.  Please take a few minutes to complete and return the survey to let us know how we are doing.      Our Clinic hours are:  Mondays    7:20 am - 7 pm  Tues -  Fri  7:20 am - 5 pm    Clinic Phone: [phone]    The clinic lab opens at 7:30 am Mon - Fri and appointments are required.    [facility]  Ph. [phone]  Monday-Thursday 8 am - 7pm  Tues/Wed/Fri 8 am - 5:30 pm                 Follow-ups after your visit        Future tests that were ordered for you today     Open Future Orders        Priority Expected Expires Ordered    TSH with free T4 reflex Routine  5/4/2019 5/4/2018    Lipid panel reflex to direct LDL Fasting Routine 4/4/2019 5/4/2019 5/4/2018            Who to contact     If you have questions or need follow up information about today's clinic visit or your schedule please contact [facility] directly at [phone].  Normal or non-critical lab and imaging results will be communicated to you by MyChart, letter or phone within 4 business days after the clinic has received the results. If you do not hear from us within 7 days, please contact the clinic through MyChart or phone. If you have a  critical or abnormal lab result, we will notify you by phone as soon as possible.  Submit refill requests through [employer] or call your pharmacy and they will forward the refill request to us. Please allow 3 business days for your refill to be completed.          Additional Information About Your Visit        [employer] Information     [employer] gives you secure access to your electronic health record. If you see a primary care provider, you can also send messages to your care team and make appointments. If you have questions, please call your primary care clinic.  If you do not have a primary care provider, please call [phone] and they will assist you.        Care EveryWhere ID     This is your Care EveryWhere ID. This could be used by other organizations to access your [place] [facility] medical records  [id number]        Your Vitals Were     Pulse Temperature Respirations Height BMI (Body Mass Index)       71 98.9  F (37.2  C) (Oral) 16 6' (1.829 m) 23.33 kg/m2        Blood Pressure from Last 3 Encounters:   05/04/18 130/80   03/25/18 147/78   02/15/18 131/81    Weight from Last 3 Encounters:   05/04/18 172 lb (78 kg)   03/25/18 184 lb 9.6 oz (83.7 kg)   02/15/18 174 lb 9.6 oz (79.2 kg)               Primary Care Provider Office Phone # Fax #    Kimmie Jennifer Pack [phone] [address] [address]        Equal Access to Services     SHANI CHEUNG AH: Hadii aad ku hadasho Soomaali, waaxda luqadaha, qaybta kaalmada adeegyada, jane nloan. So [facility] [phone].    ATENCIÓN: Si habla español, tiene a powell disposición servicios gratuitos de asistencia lingüística. Llame al [phone].    We comply with applicable federal civil rights laws and [place] laws. We do not discriminate on the basis of race, color, national origin, age, disability, sex, sexual orientation, or gender identity.            Thank you!     Thank you for choosing [facility]  for your care. Our goal is always to provide you with excellent care. Hearing back from our patients is one way we can continue to improve our services. Please take a few minutes to complete the written survey that you may receive in the mail after your visit with us. Thank you!             Your Updated Medication List - Protect others around you: Learn how to safely use, store and throw away your medicines at www.disposemymeds.org.          This list is accurate as of 5/4/18  1:10 PM.  Always use your most recent med list.                   Brand Name Dispense Instructions for use Diagnosis    ALPRAZolam 0.25 MG tablet    XANAX    20 tablet    Take 1 tablet (0.25 mg) by mouth 3 times daily as needed for anxiety    Anxiety       fluticasone 50 MCG/ACT spray    FLONASE    1 Bottle    Spray 1 spray into both nostrils 2 times daily    Acute sinusitis with symptoms > 10 days       Multi-vitamin Tabs tablet   Generic drug:  multivitamin, therapeutic with minerals      1 TABLET ORALLY DAILY        sertraline 100 MG tablet    ZOLOFT     TAKE ONE TABLET BY MOUTH ONE TIME DAILY        TYLENOL PO           VITAMIN B COMPLEX PO      1 TABLET ORALLY DAILY

## 2018-05-04 NOTE — PATIENT INSTRUCTIONS
Try not to use any topicals on your lips and see what happens.  Return for fasting labs and will be notified of those results.  If palpitations worsen, would recommend EKG and Holter monitor.  Follow up if symptoms persist or worsen and as needed.        Thank you for choosing [facility].  You may be receiving a survey in the mail from [facility] regarding your visit today.  Please take a few minutes to complete and return the survey to let us know how we are doing.      Our Clinic hours are:  Mondays    7:20 am - 7 pm  Tues -  Fri  7:20 am - 5 pm    Clinic Phone: [phone]    The clinic lab opens at 7:30 am Mon - Fri and appointments are required.    [facility]. [phone]  Monday-Thursday 8 am - 7pm  Tues/Wed/Fri 8 am - 5:30 pm

## 2018-05-11 ENCOUNTER — MYC MEDICAL ADVICE (OUTPATIENT)
Dept: FAMILY MEDICINE | Facility: CLINIC | Age: 38
End: 2018-05-11

## 2018-05-14 ENCOUNTER — MYC MEDICAL ADVICE (OUTPATIENT)
Dept: FAMILY MEDICINE | Facility: CLINIC | Age: 38
End: 2018-05-14

## 2018-06-22 ENCOUNTER — OFFICE VISIT (OUTPATIENT)
Dept: FAMILY MEDICINE | Facility: CLINIC | Age: 38
End: 2018-06-22
Payer: COMMERCIAL

## 2018-06-22 VITALS
HEART RATE: 60 BPM | RESPIRATION RATE: 16 BRPM | SYSTOLIC BLOOD PRESSURE: 134 MMHG | BODY MASS INDEX: 23.38 KG/M2 | DIASTOLIC BLOOD PRESSURE: 86 MMHG | WEIGHT: 172.4 LBS

## 2018-06-22 DIAGNOSIS — K13.0 CHEILITIS: Primary | ICD-10-CM

## 2018-06-22 PROCEDURE — 99213 OFFICE O/P EST LOW 20 MIN: CPT | Performed by: FAMILY MEDICINE

## 2018-06-22 RX ORDER — CHLORAL HYDRATE 500 MG
CAPSULE ORAL
COMMUNITY
Start: 2009-03-03 | End: 2020-10-27

## 2018-06-22 RX ORDER — TRIAMCINOLONE ACETONIDE 1 MG/G
OINTMENT TOPICAL
Qty: 30 G | Refills: 0 | Status: SHIPPED | OUTPATIENT
Start: 2018-06-22 | End: 2018-08-12

## 2018-06-22 ASSESSMENT — PAIN SCALES - GENERAL: PAINLEVEL: MODERATE PAIN (5)

## 2018-06-22 NOTE — PROGRESS NOTES
SUBJECTIVE:   Ashish Blas is a 37 year old male who presents to clinic today for the following health issues:      Concern - lip rash  Onset: x 4 months      Description:   Burning, itching, stinging, hurts to smile    Intensity: moderate    Progression of Symptoms:  worsening    Accompanying Signs & Symptoms:  Painful lips, faitgue, patchy skin flakes off and then comes back    Previous history of similar problem:   no    Precipitating factors:   Worsened by: unknown    Alleviating factors:  Improved by: petrolium jelly- keeps lips hydrated but then wears off      Additional history: This seemed to develop after he was given some antibiotics and initially was an angular cheilitis with splits in the corner of his mouth.  He went to dermatology and was prescribed desoximetasone cream, and interestingly this helped to heal the angular cheilitis, but he continued to have inflammation of his lips.  He probably took the desoximetasone for a full month.  He was seen here by Dina Gaviria after using various lip balm's including Vaseline and Chapstick.  She recommended he stop using any topical treatment on the lips to see what would happen.  However, it has continued.  He does not have any significant sun exposure and works indoors.        Reviewed and updated as needed this visit by clinical staff  Tobacco  Allergies  Med Hx  Surg Hx  Fam Hx  Soc Hx      Reviewed and updated as needed this visit by Provider                         OBJECTIVE:                                                    /86 (BP Location: Right arm, Patient Position: Chair, Cuff Size: Adult Regular)  Pulse 60  Resp 16  Wt 172 lb 6.4 oz (78.2 kg)  BMI 23.38 kg/m2    GENERAL: healthy, alert and no distress  EYES: Eyes grossly normal to inspection, extraocular movements - intact, and PERRL  SKIN: His lips are slightly erythematous and slightly puffy; no peeling or splitting or cracking at this time         ASSESSMENT/PLAN:                                                     ASSESSMENT:  1. Cheilitis    Persistent        PLAN:  Orders Placed This Encounter     fish oil-omega-3 fatty acids 1000 MG capsule     triamcinolone (KENALOG) 0.1 % ointment   I explained to them that sometimes potent steroids can actually cause atrophy of the skin and sensitive areas such as the lips.  Therefore we will switch him to a triamcinolone ointment which may be more moisturizing than the cream and have him apply it sparingly twice a day but for no more than 14 days.  Hopefully this will settle things down.  Do not apply any other topical treatments    Patient Instructions   Use the triamcinolone ointment sparingly twice daily for 14 days, then stop. This should take care of it.      LIANE Vargas  [facility]

## 2018-06-22 NOTE — PATIENT INSTRUCTIONS
Use the triamcinolone ointment sparingly twice daily for 14 days, then stop. This should take care of it.

## 2018-06-22 NOTE — MR AVS SNAPSHOT
After Visit Summary   6/22/2018    Ashish Blas    MRN: [id number]           Patient Information     Date Of Birth          1980        Visit Information        Provider Department      6/22/2018 10:40 AM LIANE Vargas MD [facility]        Today's Diagnoses     Cheilitis    -  1      Care Instructions    Use the triamcinolone ointment sparingly twice daily for 14 days, then stop. This should take care of it.          Follow-ups after your visit        Who to contact     If you have questions or need follow up information about today's clinic visit or your schedule please contact [facility] directly at [phone].  Normal or non-critical lab and imaging results will be communicated to you by MyChart, letter or phone within 4 business days after the clinic has received the results. If you do not hear from us within 7 days, please contact the clinic through [employer] or phone. If you have a critical or abnormal lab result, we will notify you by phone as soon as possible.  Submit refill requests through [employer] or call your pharmacy and they will forward the refill request to us. Please allow 3 business days for your refill to be completed.          Additional Information About Your Visit        MyChart Information     [employer] gives you secure access to your electronic health record. If you see a primary care provider, you can also send messages to your care team and make appointments. If you have questions, please call your primary care clinic.  If you do not have a primary care provider, please call [phone] and they will assist you.        Care EveryWhere ID     This is your Care EveryWhere ID. This could be used by other organizations to access your [facility] medical records  [id number]        Your Vitals Were     Pulse Respirations BMI (Body Mass Index)             60 16 23.38 kg/m2          Blood Pressure from Last 3 Encounters:   06/22/18 134/86    05/04/18 130/80   03/25/18 147/78    Weight from Last 3 Encounters:   06/22/18 172 lb 6.4 oz (78.2 kg)   05/04/18 172 lb (78 kg)   03/25/18 184 lb 9.6 oz (83.7 kg)              Today, you had the following     No orders found for display         Today's Medication Changes          These changes are accurate as of 6/22/18 11:13 AM.  If you have any questions, ask your nurse or doctor.               Start taking these medicines.        Dose/Directions    triamcinolone 0.1 % ointment   Commonly known as:  KENALOG   Used for:  Cheilitis   Started by:  LIANE Vargas MD        Apply sparingly to affected area two times daily for 14 days.   Quantity:  30 g   Refills:  0            Where to get your medicines      These medications were sent to [id number] - [facility] [address]  [address] [address]    Hours:  Closed 10-16-08 business to [facility] Phone:  [phone]     triamcinolone 0.1 % ointment                Primary Care Provider Office Phone # Fax #    Sauravmiguel Jennifer Pack [phone] [address] [address] [address]        Equal Access to Services     SHANI CHEUNG AH: Hadii demian mott hadasho Soomaali, waaxda luqadaha, qaybta kaalmada adeegyada, waxay arnoldo haybassemn antonio nolan. So [facility] [phone].    ATENCIÓN: Si habla español, tiene a powell disposición servicios gratuitos de asistencia lingüística. Llame al [phone].    We comply with applicable federal civil rights laws and [place] laws. We do not discriminate on the basis of race, color, national origin, age, disability, sex, sexual orientation, or gender identity.            Thank you!     Thank you for choosing [facility]  for your care. Our goal is always to provide you with excellent care. Hearing back from our patients is one way we can continue to improve our services. Please take a few minutes to complete the written survey that you may  receive in the mail after your visit with us. Thank you!             Your Updated Medication List - Protect others around you: Learn how to safely use, store and throw away your medicines at www.disposemymeds.org.          This list is accurate as of 6/22/18 11:13 AM.  Always use your most recent med list.                   Brand Name Dispense Instructions for use Diagnosis    ALPRAZolam 0.25 MG tablet    XANAX    20 tablet    Take 1 tablet (0.25 mg) by mouth 3 times daily as needed for anxiety    Anxiety       fish oil-omega-3 fatty acids 1000 MG capsule           fluticasone 50 MCG/ACT spray    FLONASE    1 Bottle    Spray 1 spray into both nostrils 2 times daily    Acute sinusitis with symptoms > 10 days       Multi-vitamin Tabs tablet   Generic drug:  multivitamin, therapeutic with minerals      1 TABLET ORALLY DAILY        sertraline 100 MG tablet    ZOLOFT     TAKE ONE TABLET BY MOUTH ONE TIME DAILY        triamcinolone 0.1 % ointment    KENALOG    30 g    Apply sparingly to affected area two times daily for 14 days.    Cheilitis       TYLENOL PO           VITAMIN B COMPLEX PO      1 TABLET ORALLY DAILY

## 2018-06-26 ENCOUNTER — MYC MEDICAL ADVICE (OUTPATIENT)
Dept: FAMILY MEDICINE | Facility: CLINIC | Age: 38
End: 2018-06-26

## 2018-06-26 DIAGNOSIS — K13.0 CHEILITIS: ICD-10-CM

## 2018-06-26 DIAGNOSIS — K13.0 CHEILITIS: Primary | ICD-10-CM

## 2018-06-26 LAB
ALBUMIN SERPL-MCNC: 4.7 G/DL (ref 3.4–5)
ALP SERPL-CCNC: 62 U/L (ref 40–150)
ALT SERPL W P-5'-P-CCNC: 64 U/L (ref 0–70)
ANION GAP SERPL CALCULATED.3IONS-SCNC: 11 MMOL/L (ref 3–14)
AST SERPL W P-5'-P-CCNC: 37 U/L (ref 0–45)
BASOPHILS # BLD AUTO: 0 10E9/L (ref 0–0.2)
BASOPHILS NFR BLD AUTO: 0.6 %
BILIRUB SERPL-MCNC: 0.6 MG/DL (ref 0.2–1.3)
BUN SERPL-MCNC: 14 MG/DL (ref 7–30)
CALCIUM SERPL-MCNC: 9.6 MG/DL (ref 8.5–10.1)
CHLORIDE SERPL-SCNC: 98 MMOL/L (ref 94–109)
CO2 SERPL-SCNC: 27 MMOL/L (ref 20–32)
CREAT SERPL-MCNC: 0.74 MG/DL (ref 0.66–1.25)
DIFFERENTIAL METHOD BLD: NORMAL
EOSINOPHIL # BLD AUTO: 0.2 10E9/L (ref 0–0.7)
EOSINOPHIL NFR BLD AUTO: 2.2 %
ERYTHROCYTE [DISTWIDTH] IN BLOOD BY AUTOMATED COUNT: 11.9 % (ref 10–15)
ERYTHROCYTE [SEDIMENTATION RATE] IN BLOOD BY WESTERGREN METHOD: 7 MM/H (ref 0–15)
GFR SERPL CREATININE-BSD FRML MDRD: >90 ML/MIN/1.7M2
GLUCOSE SERPL-MCNC: 79 MG/DL (ref 70–99)
HBA1C MFR BLD: 4.9 % (ref 0–5.6)
HCT VFR BLD AUTO: 40.7 % (ref 40–53)
HGB BLD-MCNC: 13.9 G/DL (ref 13.3–17.7)
LYMPHOCYTES # BLD AUTO: 2.8 10E9/L (ref 0.8–5.3)
LYMPHOCYTES NFR BLD AUTO: 40.5 %
MCH RBC QN AUTO: 31.5 PG (ref 26.5–33)
MCHC RBC AUTO-ENTMCNC: 34.2 G/DL (ref 31.5–36.5)
MCV RBC AUTO: 92 FL (ref 78–100)
MONOCYTES # BLD AUTO: 0.5 10E9/L (ref 0–1.3)
MONOCYTES NFR BLD AUTO: 7.7 %
NEUTROPHILS # BLD AUTO: 3.4 10E9/L (ref 1.6–8.3)
NEUTROPHILS NFR BLD AUTO: 49 %
PLATELET # BLD AUTO: 251 10E9/L (ref 150–450)
POTASSIUM SERPL-SCNC: 3.9 MMOL/L (ref 3.4–5.3)
PROT SERPL-MCNC: 8.4 G/DL (ref 6.8–8.8)
RBC # BLD AUTO: 4.41 10E12/L (ref 4.4–5.9)
SODIUM SERPL-SCNC: 136 MMOL/L (ref 133–144)
TSH SERPL DL<=0.005 MIU/L-ACNC: 1.42 MU/L (ref 0.4–4)
WBC # BLD AUTO: 6.8 10E9/L (ref 4–11)

## 2018-06-26 PROCEDURE — 36415 COLL VENOUS BLD VENIPUNCTURE: CPT | Performed by: FAMILY MEDICINE

## 2018-06-26 PROCEDURE — 80053 COMPREHEN METABOLIC PANEL: CPT | Performed by: FAMILY MEDICINE

## 2018-06-26 PROCEDURE — 85025 COMPLETE CBC W/AUTO DIFF WBC: CPT | Performed by: FAMILY MEDICINE

## 2018-06-26 PROCEDURE — 85652 RBC SED RATE AUTOMATED: CPT | Performed by: FAMILY MEDICINE

## 2018-06-26 PROCEDURE — 83036 HEMOGLOBIN GLYCOSYLATED A1C: CPT | Performed by: FAMILY MEDICINE

## 2018-06-26 PROCEDURE — 82785 ASSAY OF IGE: CPT | Performed by: FAMILY MEDICINE

## 2018-06-26 PROCEDURE — 84443 ASSAY THYROID STIM HORMONE: CPT | Performed by: FAMILY MEDICINE

## 2018-06-26 NOTE — TELEPHONE ENCOUNTER
Dr Vargas,    You saw this patient yesterday. He is stating that the triamcinolone is only making his cellulitis worse. Please see [employer] message below.  Please advise.    Thank you,  Arabella PERDOMO RN

## 2018-06-28 LAB — IGE SERPL-ACNC: 11 KIU/L (ref 0–114)

## 2018-06-28 NOTE — PROGRESS NOTES
Ashish,  All of your results came back normal, including a screen for allergies, screen for diabetes, indicators of inflammation, and the screen for hypothyroidism.  If this is still not getting better, I think you should see dermatology and asked to see the actual dermatologist, not one of his Radha Vargas MD

## 2018-08-12 ENCOUNTER — HOSPITAL ENCOUNTER (EMERGENCY)
Facility: CLINIC | Age: 38
Discharge: HOME | End: 2018-08-12
Attending: EMERGENCY MEDICINE | Admitting: EMERGENCY MEDICINE
Payer: COMMERCIAL

## 2018-08-12 ENCOUNTER — APPOINTMENT (OUTPATIENT)
Dept: GENERAL RADIOLOGY | Facility: CLINIC | Age: 38
End: 2018-08-12
Attending: EMERGENCY MEDICINE
Payer: COMMERCIAL

## 2018-08-12 VITALS
DIASTOLIC BLOOD PRESSURE: 66 MMHG | HEIGHT: 72 IN | RESPIRATION RATE: 24 BRPM | SYSTOLIC BLOOD PRESSURE: 107 MMHG | OXYGEN SATURATION: 97 % | TEMPERATURE: 99.2 F | BODY MASS INDEX: 22.35 KG/M2 | WEIGHT: 165 LBS

## 2018-08-12 DIAGNOSIS — R07.9 RIGHT-SIDED CHEST PAIN: ICD-10-CM

## 2018-08-12 LAB
ALBUMIN SERPL-MCNC: 4.5 G/DL (ref 3.4–5)
ALP SERPL-CCNC: 68 U/L (ref 40–150)
ALT SERPL W P-5'-P-CCNC: 49 U/L (ref 0–70)
ANION GAP SERPL CALCULATED.3IONS-SCNC: 8 MMOL/L (ref 3–14)
AST SERPL W P-5'-P-CCNC: 27 U/L (ref 0–45)
BASOPHILS # BLD AUTO: 0 10E9/L (ref 0–0.2)
BASOPHILS NFR BLD AUTO: 0.9 %
BILIRUB SERPL-MCNC: 0.6 MG/DL (ref 0.2–1.3)
BUN SERPL-MCNC: 11 MG/DL (ref 7–30)
CALCIUM SERPL-MCNC: 9 MG/DL (ref 8.5–10.1)
CHLORIDE SERPL-SCNC: 100 MMOL/L (ref 94–109)
CO2 SERPL-SCNC: 29 MMOL/L (ref 20–32)
CREAT SERPL-MCNC: 0.79 MG/DL (ref 0.66–1.25)
D DIMER PPP FEU-MCNC: <0.3 UG/ML FEU (ref 0–0.5)
DIFFERENTIAL METHOD BLD: NORMAL
EOSINOPHIL # BLD AUTO: 0.2 10E9/L (ref 0–0.7)
EOSINOPHIL NFR BLD AUTO: 3.9 %
ERYTHROCYTE [DISTWIDTH] IN BLOOD BY AUTOMATED COUNT: 11.7 % (ref 10–15)
GFR SERPL CREATININE-BSD FRML MDRD: >90 ML/MIN/1.7M2
GLUCOSE SERPL-MCNC: 136 MG/DL (ref 70–99)
HCT VFR BLD AUTO: 41.7 % (ref 40–53)
HGB BLD-MCNC: 14.3 G/DL (ref 13.3–17.7)
IMM GRANULOCYTES # BLD: 0 10E9/L (ref 0–0.4)
IMM GRANULOCYTES NFR BLD: 0 %
LIPASE SERPL-CCNC: 119 U/L (ref 73–393)
LYMPHOCYTES # BLD AUTO: 2.3 10E9/L (ref 0.8–5.3)
LYMPHOCYTES NFR BLD AUTO: 48.9 %
MCH RBC QN AUTO: 31 PG (ref 26.5–33)
MCHC RBC AUTO-ENTMCNC: 34.3 G/DL (ref 31.5–36.5)
MCV RBC AUTO: 90 FL (ref 78–100)
MONOCYTES # BLD AUTO: 0.4 10E9/L (ref 0–1.3)
MONOCYTES NFR BLD AUTO: 7.8 %
NEUTROPHILS # BLD AUTO: 1.8 10E9/L (ref 1.6–8.3)
NEUTROPHILS NFR BLD AUTO: 38.5 %
NRBC # BLD AUTO: 0 10*3/UL
NRBC BLD AUTO-RTO: 0 /100
PLATELET # BLD AUTO: 231 10E9/L (ref 150–450)
POTASSIUM SERPL-SCNC: 3.7 MMOL/L (ref 3.4–5.3)
PROT SERPL-MCNC: 8.2 G/DL (ref 6.8–8.8)
RBC # BLD AUTO: 4.62 10E12/L (ref 4.4–5.9)
SODIUM SERPL-SCNC: 137 MMOL/L (ref 133–144)
TROPONIN I SERPL-MCNC: <0.015 UG/L (ref 0–0.04)
WBC # BLD AUTO: 4.6 10E9/L (ref 4–11)

## 2018-08-12 PROCEDURE — 25000128 H RX IP 250 OP 636: Performed by: EMERGENCY MEDICINE

## 2018-08-12 PROCEDURE — 84484 ASSAY OF TROPONIN QUANT: CPT | Performed by: EMERGENCY MEDICINE

## 2018-08-12 PROCEDURE — 80053 COMPREHEN METABOLIC PANEL: CPT | Performed by: EMERGENCY MEDICINE

## 2018-08-12 PROCEDURE — 99284 EMERGENCY DEPT VISIT MOD MDM: CPT | Mod: 25 | Performed by: EMERGENCY MEDICINE

## 2018-08-12 PROCEDURE — 85379 FIBRIN DEGRADATION QUANT: CPT | Performed by: EMERGENCY MEDICINE

## 2018-08-12 PROCEDURE — 83690 ASSAY OF LIPASE: CPT | Performed by: EMERGENCY MEDICINE

## 2018-08-12 PROCEDURE — 96374 THER/PROPH/DIAG INJ IV PUSH: CPT

## 2018-08-12 PROCEDURE — 93005 ELECTROCARDIOGRAM TRACING: CPT

## 2018-08-12 PROCEDURE — 99284 EMERGENCY DEPT VISIT MOD MDM: CPT | Mod: 25

## 2018-08-12 PROCEDURE — 85025 COMPLETE CBC W/AUTO DIFF WBC: CPT | Performed by: EMERGENCY MEDICINE

## 2018-08-12 PROCEDURE — 93010 ELECTROCARDIOGRAM REPORT: CPT | Mod: Z6 | Performed by: EMERGENCY MEDICINE

## 2018-08-12 PROCEDURE — 71046 X-RAY EXAM CHEST 2 VIEWS: CPT

## 2018-08-12 RX ORDER — KETOROLAC TROMETHAMINE 30 MG/ML
30 INJECTION, SOLUTION INTRAMUSCULAR; INTRAVENOUS ONCE
Status: COMPLETED | OUTPATIENT
Start: 2018-08-12 | End: 2018-08-12

## 2018-08-12 RX ADMIN — KETOROLAC TROMETHAMINE 30 MG: 30 INJECTION, SOLUTION INTRAMUSCULAR at 11:58

## 2018-08-12 ASSESSMENT — ENCOUNTER SYMPTOMS
COUGH: 0
SHORTNESS OF BREATH: 0
ABDOMINAL PAIN: 0
DYSURIA: 0

## 2018-08-12 NOTE — ED AVS SNAPSHOT
[facility] Emergency Department    [address] [address]    Phone:  [phone]    Fax:  [phone]                                       Ashish Blas   MRN: [id number]    Department:  [facility] Emergency Department   Date of Visit:  8/12/2018           After Visit Summary Signature Page     I have received my discharge instructions, and my questions have been answered. I have discussed any challenges I see with this plan with the nurse or doctor.    ..........................................................................................................................................  Patient/Patient Representative Signature      ..........................................................................................................................................  Patient Representative Print Name and Relationship to Patient    ..................................................               ................................................  Date                                            Time    ..........................................................................................................................................  Reviewed by Signature/Title    ...................................................              ..............................................  Date                                                            Time

## 2018-08-12 NOTE — ED AVS SNAPSHOT
[facility] Emergency Department    [address] [facility] [address]    Phone:  [phone]    Fax:  [phone]                                       Ashish Blas   MRN: [id number]    Department:  [facility] Emergency Department   Date of Visit:  8/12/2018           Patient Information     Date Of Birth          1980        Your diagnoses for this visit were:     Right-sided chest pain        You were seen by Presley Monreal MD.      Follow-up Information     Follow up with Kimmie Pack MD.    Specialty:  Family Practice    Why:  As needed    Contact information:    [address]  [facility] [address]  [phone]          Follow up with [facility] Emergency Department.    Specialty:  EMERGENCY MEDICINE    Why:  If symptoms worsen    Contact information:    [address] [place] [id number] [phone]    Additional information:    The medical center is located at   [address] [facility] (between [address] in [place], [place] miles north   of [place]).        Discharge Instructions       Return if symptoms worsen or new symptoms develop.  Follow-up with primary care physician next available.  Drink plenty of fluids.  Follow-up with primary care physician next available after ultrasound.  Take ibuprofen or Tylenol for pain.  If worsening pain any shortness of breath abdominal pain fever chills numbness weakness any extremity please return for further evaluation and care.  *CHEST PAIN, UNCERTAIN CAUSE    Based on your exam today, the exact cause of your chest pain is not certain. Your condition does not seem serious at this time, and your pain does not appear to be coming from your heart. However, sometimes the signs of a serious problem take more time to appear. Therefore, watch for the warning signs listed below.  HOME CARE:    1. Rest today and avoid strenuous activity.  2. Take any prescribed medicine as directed.  FOLLOW UP with  your doctor in 1-3 days.   GET PROMPT MEDICAL ATTENTION if any of the following occur:    A change in the type of pain: if it feels different, becomes more severe, lasts longer, or begins to spread into your shoulder, arm, neck, jaw or back    Shortness of breath or increased pain with breathing    Weakness, dizziness, or fainting    Cough with blood or dark colored sputum (phlegm)    Fever over 101  F (38.3  C)    Swelling, pain or redness in one leg    7312-2863 [employer] [address] [place] [address]. All rights reserved. This information is not intended as a substitute for professional medical care. Always follow your healthcare professional's instructions.  This information has been modified by your health care provider with permission from the publisher.      24 Hour Appointment Hotline       To make an appointment at any [facility], call [phone] ([phone]). If you don't have a family doctor or clinic, we will help you find one. [facility] clinics are conveniently located to serve the needs of you and your family.          ED Discharge Orders     US Abdomen Limited*                    Review of your medicines      Our records show that you are taking the medicines listed below. If these are incorrect, please call your family doctor or clinic.        Dose / Directions Last dose taken    ALPRAZolam 0.25 MG tablet   Commonly known as:  XANAX   Dose:  0.25 mg   Quantity:  20 tablet        Take 1 tablet (0.25 mg) by mouth 3 times daily as needed for anxiety   Refills:  0        fish oil-omega-3 fatty acids 1000 MG capsule        Refills:  0        sertraline 100 MG tablet   Commonly known as:  ZOLOFT        TAKE ONE TABLET BY MOUTH ONE TIME DAILY   Refills:  0        VITAMIN B COMPLEX PO        1 TABLET ORALLY DAILY   Refills:  0                Procedures and tests performed during your visit     CBC with platelets differential    Chest XR,  PA & LAT    Comprehensive  metabolic panel    D dimer quantitative    EKG 12 lead    Lipase    Troponin I      Orders Needing Specimen Collection     None      Pending Results     No orders found from 8/10/2018 to 8/13/2018.            Pending Culture Results     No orders found from 8/10/2018 to 8/13/2018.            Pending Results Instructions     If you had any lab results that were not finalized at the time of your Discharge, you can call the ED Lab Result RN at [phone]. You will be contacted by this team for any positive Lab results or changes in treatment. The nurses are available 7 days a week from 10A to 6:30P.  You can leave a message 24 hours per day and they will return your call.        Test Results From Your Hospital Stay        8/12/2018 11:03 AM      Component Results     Component Value Ref Range & Units Status    WBC 4.6 4.0 - 11.0 10e9/L Final    RBC Count 4.62 4.4 - 5.9 10e12/L Final    Hemoglobin 14.3 13.3 - 17.7 g/dL Final    Hematocrit 41.7 40.0 - 53.0 % Final    MCV 90 78 - 100 fl Final    MCH 31.0 26.5 - 33.0 pg Final    MCHC 34.3 31.5 - 36.5 g/dL Final    RDW 11.7 10.0 - 15.0 % Final    Platelet Count 231 150 - 450 10e9/L Final    Diff Method Automated Method  Final    % Neutrophils 38.5 % Final    % Lymphocytes 48.9 % Final    % Monocytes 7.8 % Final    % Eosinophils 3.9 % Final    % Basophils 0.9 % Final    % Immature Granulocytes 0.0 % Final    Nucleated RBCs 0 0 /100 Final    Absolute Neutrophil 1.8 1.6 - 8.3 10e9/L Final    Absolute Lymphocytes 2.3 0.8 - 5.3 10e9/L Final    Absolute Monocytes 0.4 0.0 - 1.3 10e9/L Final    Absolute Eosinophils 0.2 0.0 - 0.7 10e9/L Final    Absolute Basophils 0.0 0.0 - 0.2 10e9/L Final    Abs Immature Granulocytes 0.0 0 - 0.4 10e9/L Final    Absolute Nucleated RBC 0.0  Final         8/12/2018 11:32 AM      Component Results     Component Value Ref Range & Units Status    Sodium 137 133 - 144 mmol/L Final    Potassium 3.7 3.4 - 5.3 mmol/L Final    Chloride 100 94 - 109 mmol/L  Final    Carbon Dioxide 29 20 - 32 mmol/L Final    Anion Gap 8 3 - 14 mmol/L Final    Glucose 136 (H) 70 - 99 mg/dL Final    Urea Nitrogen 11 7 - 30 mg/dL Final    Creatinine 0.79 0.66 - 1.25 mg/dL Final    GFR Estimate >90 >60 mL/min/1.7m2 Final    Non  GFR Calc    GFR Estimate If Black >90 >60 mL/min/1.7m2 Final    African American GFR Calc    Calcium 9.0 8.5 - 10.1 mg/dL Final    Bilirubin Total 0.6 0.2 - 1.3 mg/dL Final    Albumin 4.5 3.4 - 5.0 g/dL Final    Protein Total 8.2 6.8 - 8.8 g/dL Final    Alkaline Phosphatase 68 40 - 150 U/L Final    ALT 49 0 - 70 U/L Final    AST 27 0 - 45 U/L Final         8/12/2018 11:32 AM      Component Results     Component Value Ref Range & Units Status    Troponin I ES <0.015 0.000 - 0.045 ug/L Final    The 99th percentile for upper reference range is 0.045 ug/L.  Troponin values   in the range of 0.045 - 0.120 ug/L may be associated with risks of adverse   clinical events.           8/12/2018 11:05 AM      Narrative     XR CHEST 2 VW 8/12/2018 11:01 AM    HISTORY: Pain.    COMPARISON: February 10, 2017.        Impression     IMPRESSION: The lungs are clear. No focal pulmonary opacities. Heart  and mediastinum are unremarkable. No acute cardiopulmonary  abnormalities.    FIDEL CHOW MD         8/12/2018 11:23 AM      Component Results     Component Value Ref Range & Units Status    Lipase 119 73 - 393 U/L Final         8/12/2018 12:28 PM      Component Results     Component Value Ref Range & Units Status    D Dimer <0.3 0.0 - 0.50 ug/ml FEU Final    This D-dimer assay is intended for use in conjunction with a clinical pretest   probability assessment model to exclude pulmonary embolism (PE) and deep   venous thrombosis (DVT) in outpatients suspected of PE or DVT. The cut-off   value is 0.5 ug/mL FEU.                  Thank you for choosing [facility]       Thank you for choosing [facility] for your care. Our goal is always to provide you with excellent care.  Hearing back from our patients is one way we can continue to improve our services. Please take a few minutes to complete the written survey that you may receive in the mail after you visit with us. Thank you!        [facility] Information     [facility] gives you secure access to your electronic health record. If you see a primary care provider, you can also send messages to your care team and make appointments. If you have questions, please call your primary care clinic.  If you do not have a primary care provider, please call [phone] and they will assist you.        Care EveryWhere ID     This is your Care EveryWhere ID. This could be used by other organizations to access your [facility] medical records  [id number]        Equal Access to Services     SHANI CHEUNG : Nba Browning, sabine sher, nola dao, jane nolan. So [facility] [phone].    ATENCIÓN: Si habla español, tiene a powell disposición servicios gratuitos de asistencia lingüística. Llame al [phone].    We comply with applicable federal civil rights laws and [place] laws. We do not discriminate on the basis of race, color, national origin, age, disability, sex, sexual orientation, or gender identity.            After Visit Summary       This is your record. Keep this with you and show to your community pharmacist(s) and doctor(s) at your next visit.

## 2018-08-12 NOTE — ED NOTES
Pt updated on plan of care. Pt eager for update from MD. Pt reports increased nausea but declines any intervention at this time.

## 2018-08-12 NOTE — DISCHARGE INSTRUCTIONS
Return if symptoms worsen or new symptoms develop.  Follow-up with primary care physician next available.  Drink plenty of fluids.  Follow-up with primary care physician next available after ultrasound.  Take ibuprofen or Tylenol for pain.  If worsening pain any shortness of breath abdominal pain fever chills numbness weakness any extremity please return for further evaluation and care.  *CHEST PAIN, UNCERTAIN CAUSE    Based on your exam today, the exact cause of your chest pain is not certain. Your condition does not seem serious at this time, and your pain does not appear to be coming from your heart. However, sometimes the signs of a serious problem take more time to appear. Therefore, watch for the warning signs listed below.  HOME CARE:    1. Rest today and avoid strenuous activity.  2. Take any prescribed medicine as directed.  FOLLOW UP with your doctor in 1-3 days.   GET PROMPT MEDICAL ATTENTION if any of the following occur:    A change in the type of pain: if it feels different, becomes more severe, lasts longer, or begins to spread into your shoulder, arm, neck, jaw or back    Shortness of breath or increased pain with breathing    Weakness, dizziness, or fainting    Cough with blood or dark colored sputum (phlegm)    Fever over 101  F (38.3  C)    Swelling, pain or redness in one leg    9177-4990 [employer]. [address] [place] [address], [place] [id number]. All rights reserved. This information is not intended as a substitute for professional medical care. Always follow your healthcare professional's instructions.  This information has been modified by your health care provider with permission from the publisher.

## 2018-08-12 NOTE — ED PROVIDER NOTES
"  History     Chief Complaint   Patient presents with     Chest Pain     x 2 mos-also burning sensation upper back and shoulders x 2 mos     HPI  Ashish Blas is a 37 year old male who presents to the emergency department today for evaluation of chest pain. Patient has been having intermittent pain in his right lower anterior chest for two months. Along with chest pain, he complains of \"itchiness\" in his upper back. He denies any rashes on his back. Over the past three days his chest pain has worsened and become more constant. This morning he woke up and his pain had worsened overnight and is completely constant now. He describes his pain as a burning, throbbing sensation. His pain is not exacerbated by eating or exercising. Patient notes that it is the same kind of pain that he experienced while he had appendicitis. He denies any shortness of breath, or coughing. Patient denies any urinary symptoms or abdominal pain. He denies any recent falls or trauma to the area. He is an otherwise healthy male. He has no history of hypertension, hyperlipidemia, or diabetes mellitus. His father had a heart murmur. No other family history of heart problems. Patient is not currently taking any prescription medications.       Patient Active Problem List   Diagnosis     Esophageal reflux     Anxiety     Mixed hyperlipidemia     Current Outpatient Prescriptions   Medication Sig Dispense Refill     Acetaminophen (TYLENOL PO)        ALPRAZolam (XANAX) 0.25 MG tablet Take 1 tablet (0.25 mg) by mouth 3 times daily as needed for anxiety 20 tablet 0     fish oil-omega-3 fatty acids 1000 MG capsule        fluticasone (FLONASE) 50 MCG/ACT spray Spray 1 spray into both nostrils 2 times daily (Patient not taking: Reported on 5/4/2018) 1 Bottle 11     MULTI-VITAMIN OR TABS 1 TABLET ORALLY DAILY       sertraline (ZOLOFT) 100 MG tablet TAKE ONE TABLET BY MOUTH ONE TIME DAILY       triamcinolone (KENALOG) 0.1 % ointment Apply sparingly to affected " area two times daily for 14 days. 30 g 0     VITAMIN B COMPLEX OR 1 TABLET ORALLY DAILY       No Known Allergies      Problem List:    Patient Active Problem List    Diagnosis Date Noted     Mixed hyperlipidemia 01/28/2014     Priority: Medium     Esophageal reflux 11/20/2013     Priority: Medium     Surgery in 2009:PROCEDURE: 1. Laparoscopic Nissen fundoplication. 2. Laparoscopic repair of hiatal hernia.            Anxiety 11/20/2013     Priority: Medium     He has had anxiety since age 21.  On sertraline for 1 and 1/2 years.  Previously treated with Paxil.  He has had panic attacks.           Past Medical History:    Past Medical History:   Diagnosis Date     Anxiety 2002     Esophageal reflux 1/15/17     Hyperlipidemia      Stomach problems        Past Surgical History:    Past Surgical History:   Procedure Laterality Date     ABDOMEN SURGERY  08/01/2009    lap nissen     APPENDECTOMY  10/15/2009    age 30 approx     COLONOSCOPY  2/17/2014    Procedure: COLONOSCOPY;  Colonoscopy  ;  Surgeon: Abdon Pagan MD;  Location: [facility]     COLONOSCOPY N/A 5/11/2016    Procedure: COLONOSCOPY;  Surgeon: Christian Malagon MD;  Location: [facility]     ENDOSCOPY      many since he was 18     procedure for acid reflux  08/15/2009       Family History:    Family History   Problem Relation Age of Onset     Hypertension Mother      Diabetes Mother      Lipids Mother      Hyperlipidemia Mother      Lipids Father      Cancer Father      Non Hodgkins Lymphoma     Hyperlipidemia Father      Diabetes Maternal Grandmother      Hypertension Maternal Grandmother      Cerebrovascular Disease Maternal Grandmother      70     Cancer Maternal Grandmother      rectal cancer     HEART DISEASE Maternal Grandfather      later 50s     Breast Cancer Paternal Grandmother      Lipids Brother      Cancer Sister      skin cancer     Cancer - colorectal Brother 37     Hypothyroidism Brother      Thyroid Disease Brother        Social  History:  Marital Status:   [2]  Social History   Substance Use Topics     Smoking status: Former Smoker     Years: 10.00     Types: Cigarettes     Start date: 7/19/2016     Quit date: 1/2/2017     Smokeless tobacco: Never Used      Comment: Quit date is 5/30/2016     Alcohol use Yes      Comment: occ        Medications:      Acetaminophen (TYLENOL PO)   ALPRAZolam (XANAX) 0.25 MG tablet   fish oil-omega-3 fatty acids 1000 MG capsule   fluticasone (FLONASE) 50 MCG/ACT spray   MULTI-VITAMIN OR TABS   sertraline (ZOLOFT) 100 MG tablet   triamcinolone (KENALOG) 0.1 % ointment   VITAMIN B COMPLEX OR         Review of Systems   Constitutional: Negative for appetite change, chills and fever.   HENT: Negative for congestion.    Eyes: Negative for visual disturbance.   Respiratory: Negative for cough and shortness of breath.    Cardiovascular: Positive for chest pain. Negative for palpitations and leg swelling.   Gastrointestinal: Negative for abdominal pain and nausea.   Genitourinary: Negative for decreased urine volume and dysuria.   Musculoskeletal: Negative for back pain and neck pain.   Skin: Negative for rash.   Neurological: Negative for dizziness, weakness, light-headedness, numbness and headaches.   Psychiatric/Behavioral: Negative for confusion.       Physical Exam   BP: 135/87  Heart Rate: 97  Temp: 99.2  F (37.3  C)  Resp: 16  Height: 182.9 cm (6')  Weight: 74.8 kg (165 lb)  SpO2: 100 %      Physical Exam   Constitutional: He appears well-developed and well-nourished. No distress.   Psychiatric: He has a normal mood and affect.   Nursing note and vitals reviewed.    HENT: Oral mucosa moist. No lesions.  Neck: Supple  Pulmonary/Chest: Lungs are clear to auscultation bilaterally. Tenderness to palpation in the right lower anterior chest wall. No erythema, swelling or fluctuant.   Cardiovascular: Heart is regular rate and rhythm. No murmur.  Abdomen: Soft, non-distended, non-tender.   Musculoskeletal:  Moving all extremities well. No peripheral edema.   Neurological: Alert. No focal neurologic deficit.   Skin: No rash.    ED Course   10:42 AM Patient Assessed.     ED Course     Procedures               EKG Interpretation:      Interpreted by Presley Monreal  Rhythm: normal sinus   Rate: normal  Axis: normal  Ectopy: none  Conduction: normal  ST Segments/ T Waves: No ST-T wave changes  Q Waves: none  Comparison to prior:No old ekg    Clinical Impression: normal EKG          Critical Care time:  none               Results for orders placed or performed during the hospital encounter of 08/12/18   Chest XR,  PA & LAT    Narrative    XR CHEST 2 VW 8/12/2018 11:01 AM    HISTORY: Pain.    COMPARISON: February 10, 2017.      Impression    IMPRESSION: The lungs are clear. No focal pulmonary opacities. Heart  and mediastinum are unremarkable. No acute cardiopulmonary  abnormalities.    FIDEL CHOW MD         Medications   ketorolac (TORADOL) injection 30 mg (30 mg Intravenous Given 8/12/18 1158)     Labs Ordered and Resulted from Time of ED Arrival Up to the Time of Departure from the ED   COMPREHENSIVE METABOLIC PANEL - Abnormal; Notable for the following:        Result Value    Glucose 136 (*)     All other components within normal limits   CBC WITH PLATELETS DIFFERENTIAL   TROPONIN I   LIPASE   D DIMER QUANTITATIVE       Assessments & Plan (with Medical Decision Making)records were reviewed. Labs were obtained. ekg was without acute abnormality. Patient was given toradol for pain. Labs were without acute abnormality. Troponin was negative and ddimer was within normal limits. NO elevation of liver function tests. CXR with no evidence of abnormality. Pain improved with pain med. Findings discussed in detail with the patient. No obvious source of his pain . I am going to set up an outpatient RU us and have his follow up with his primary care. Has history of reflux and could try OTC GI med as in zantac or pepcid .  Patient should follow up with primary care after us. He will return if symptoms worsen or new symptoms develop.      I have reviewed the nursing notes.    I have reviewed the findings, diagnosis, plan and need for follow up with the patient.       Discharge Medication List as of 8/12/2018  1:47 PM          Final diagnoses:   Right-sided chest pain   This document serves as a record of the services and decisions personally performed and made by Presley Monreal MD. It was created on HIS/HER behalf by Marlene Navarrete, a trained medical scribe. The creation of this document is based on the provider's statements to the medical scribe.  Marlene Navarrete 10:49 AM 8/12/2018    Provider:  The information in this document, created by the medical scribe for me, accurately reflects the services I personally performed and the decisions made by me. I have reviewed and approved this document for accuracy prior to leaving the patient care area.  Presley Monreal MD 10:49 AM 8/12/2018 8/12/2018   [facility] EMERGENCY DEPARTMENT     Presley Monreal MD  08/15/18 4791

## 2018-08-12 NOTE — ED NOTES
Right lateral rib chest pain for past 2 months. Pain has been intermittent. Has not associated with any movement, eating or activity.  Today Has been constant pain with occ sharp spasms. slight nausea. No vomiting.  No SOA or cough, non smoker. Not related to activity. May increase pain slight with deep inspiration. Pain worsened today. No injury or trauma. Also has had ichyness on upper back.

## 2018-08-13 ENCOUNTER — TELEPHONE (OUTPATIENT)
Dept: GASTROENTEROLOGY | Facility: CLINIC | Age: 38
End: 2018-08-13

## 2018-08-13 DIAGNOSIS — K21.9 GASTROESOPHAGEAL REFLUX DISEASE, ESOPHAGITIS PRESENCE NOT SPECIFIED: Primary | ICD-10-CM

## 2018-08-13 NOTE — TELEPHONE ENCOUNTER
[facility] Call Center    Phone Message    May a detailed message be left on voicemail: yes    Reason for Call: Other: Please place order for endoscopy for pt at [facility].     Action Taken: Message routed to:  [facility] christian gi

## 2018-08-14 ENCOUNTER — MYC REFILL (OUTPATIENT)
Dept: FAMILY MEDICINE | Facility: CLINIC | Age: 38
End: 2018-08-14

## 2018-08-14 DIAGNOSIS — F41.9 ANXIETY: ICD-10-CM

## 2018-08-14 RX ORDER — ALPRAZOLAM 0.25 MG
0.25 TABLET ORAL 3 TIMES DAILY PRN
Qty: 20 TABLET | Refills: 0 | Status: CANCELLED | OUTPATIENT
Start: 2018-08-14

## 2018-08-14 NOTE — TELEPHONE ENCOUNTER
Message from [facility]:  Original authorizing provider: MD Ashish Rick would like a refill of the following medications:  ALPRAZolam (XANAX) 0.25 MG tablet [Kimmie Pack MD]    Preferred pharmacy: [id number] [address]    Comment:  It s been awhile and I ran out. Thank you

## 2018-08-14 NOTE — TELEPHONE ENCOUNTER
Routing refill request to provider for review/approval because:  Drug not on the [facility] refill protocol     Thank you  Christina ARMANDO RN

## 2018-08-15 ASSESSMENT — ENCOUNTER SYMPTOMS
CHILLS: 0
HEADACHES: 0
NUMBNESS: 0
WEAKNESS: 0
NAUSEA: 0
FEVER: 0
DIZZINESS: 0
BACK PAIN: 0
NECK PAIN: 0
LIGHT-HEADEDNESS: 0
CONFUSION: 0
APPETITE CHANGE: 0
PALPITATIONS: 0

## 2018-08-15 NOTE — TELEPHONE ENCOUNTER
Last seen by me 3/2017. No showed his visit with me 2/2018. Needs office visit for any prescription.

## 2018-08-16 ENCOUNTER — CARE COORDINATION (OUTPATIENT)
Dept: GASTROENTEROLOGY | Facility: CLINIC | Age: 38
End: 2018-08-16

## 2018-08-16 NOTE — TELEPHONE ENCOUNTER
Order placed for EGD. OK for [facility]    Thor Angeles MD    [facility]  Division of Gastroenterology, Hepatology and Nutrition

## 2018-08-28 ENCOUNTER — TELEPHONE (OUTPATIENT)
Dept: GASTROENTEROLOGY | Facility: OUTPATIENT CENTER | Age: 38
End: 2018-08-28

## 2018-08-31 ENCOUNTER — TELEPHONE (OUTPATIENT)
Dept: FAMILY MEDICINE | Facility: CLINIC | Age: 38
End: 2018-08-31

## 2018-08-31 DIAGNOSIS — K13.0 CHEILITIS: Primary | ICD-10-CM

## 2018-08-31 NOTE — TELEPHONE ENCOUNTER
Reason for Call: Request for an order or referral:    Order or referral being requested: Insurance referral needed for Internal medicine at [facility]. Pt has [employer] insurance. He is being seen for Cheilitis.    Date needed: as soon as possible    Has the patient been seen by the PCP for this problem? YES    Additional comments:     Phone number Patient can be reached at:  Home number on file [phone] (home)    Best Time:  any    Can we leave a detailed message on this number?      Call taken on 8/31/2018 at 2:43 PM by Dina Olivares

## 2018-08-31 NOTE — TELEPHONE ENCOUNTER
I have not seen him since 3/2017 and have never seen him for cheilitis . He saw Dr. Vargas and Dina Gaviria for the cheilitis.   Perhaps they would be better able to address this.

## 2018-09-04 ENCOUNTER — TELEPHONE (OUTPATIENT)
Dept: FAMILY MEDICINE | Facility: CLINIC | Age: 38
End: 2018-09-04

## 2018-09-04 ENCOUNTER — TELEPHONE (OUTPATIENT)
Dept: GASTROENTEROLOGY | Facility: OUTPATIENT CENTER | Age: 38
End: 2018-09-04

## 2018-09-04 NOTE — TELEPHONE ENCOUNTER
Reason for Call:  Other insurance referral    Detailed comments: this is different than the order that is placed to go to [facility], this is done online. Provider portal website ([employer]/ provider) is were this is done. His appointment at [facility] is tomorrow so this needs to be done before this appointment    Phone Number Patient can be reached at: Other phone number:  Arian mcwilliams [phone]    Best Time: any    Can we leave a detailed message on this number? YES    Call taken on 9/4/2018 at 9:05 AM by Luna Campos

## 2018-09-04 NOTE — TELEPHONE ENCOUNTER
[employer] had gotten the ok to pay already.  Pt is ok'd to go out of network to the [facility].  KpavelRN

## 2018-11-23 ENCOUNTER — OFFICE VISIT (OUTPATIENT)
Dept: FAMILY MEDICINE | Facility: CLINIC | Age: 38
End: 2018-11-23
Payer: COMMERCIAL

## 2018-11-23 VITALS
TEMPERATURE: 98.5 F | RESPIRATION RATE: 18 BRPM | SYSTOLIC BLOOD PRESSURE: 124 MMHG | DIASTOLIC BLOOD PRESSURE: 70 MMHG | HEIGHT: 72 IN | HEART RATE: 80 BPM | BODY MASS INDEX: 25.87 KG/M2 | WEIGHT: 191 LBS

## 2018-11-23 DIAGNOSIS — J01.90 ACUTE SINUSITIS WITH SYMPTOMS > 10 DAYS: Primary | ICD-10-CM

## 2018-11-23 DIAGNOSIS — R07.0 THROAT PAIN: ICD-10-CM

## 2018-11-23 LAB
DEPRECATED S PYO AG THROAT QL EIA: NORMAL
SPECIMEN SOURCE: NORMAL

## 2018-11-23 PROCEDURE — 87081 CULTURE SCREEN ONLY: CPT | Performed by: NURSE PRACTITIONER

## 2018-11-23 PROCEDURE — 99213 OFFICE O/P EST LOW 20 MIN: CPT | Performed by: NURSE PRACTITIONER

## 2018-11-23 PROCEDURE — 87880 STREP A ASSAY W/OPTIC: CPT | Performed by: NURSE PRACTITIONER

## 2018-11-23 RX ORDER — FLUTICASONE PROPIONATE 50 MCG
1-2 SPRAY, SUSPENSION (ML) NASAL DAILY
Qty: 16 G | Refills: 0 | Status: SHIPPED | OUTPATIENT
Start: 2018-11-23 | End: 2018-12-07

## 2018-11-23 NOTE — LETTER
My Depression Action Plan  Name: Ashish Blas   Date of Birth 1980  Date: 11/23/2018    My doctor: Kimmie Pack   My clinic: [address] [address] [phone]          GREEN    ZONE   Good Control    What it looks like:     Things are going generally well. You have normal up s and down s. You may even feel depressed from time to time, but bad moods usually last less than a day.   What you need to do:  1. Continue to care for yourself (see self care plan)  2. Check your depression survival kit and update it as needed  3. Follow your physician s recommendations including any medication.  4. Do not stop taking medication unless you consult with your physician first.           YELLOW         ZONE Getting Worse    What it looks like:     Depression is starting to interfere with your life.     It may be hard to get out of bed; you may be starting to isolate yourself from others.    Symptoms of depression are starting to last most all day and this has happened for several days.     You may have suicidal thoughts but they are not constant.   What you need to do:     1. Call your care team, your response to treatment will improve if you keep your care team informed of your progress. Yellow periods are signs an adjustment may need to be made.     2. Continue your self-care, even if you have to fake it!    3. Talk to someone in your support network    4. Open up your depression survival kit           RED    ZONE Medical Alert - Get Help    What it looks like:     Depression is seriously interfering with your life.     You may experience these or other symptoms: You can t get out of bed most days, can t work or engage in other necessary activities, you have trouble taking care of basic hygiene, or basic responsibilities, thoughts of suicide or death that will not go away, self-injurious behavior.     What you need to do:  1. Call your care  team and request a same-day appointment. If they are not available (weekends or after hours) call your local crisis line, emergency room or 911.            Depression Self Care Plan / Survival Kit    Self-Care for Depression  Here s the deal. Your body and mind are really not as separate as most people think.  What you do and think affects how you feel and how you feel influences what you do and think. This means if you do things that people who feel good do, it will help you feel better.  Sometimes this is all it takes.  There is also a place for medication and therapy depending on how severe your depression is, so be sure to consult with your medical provider and/ or Behavioral Health Consultant if your symptoms are worsening or not improving.     In order to better manage my stress, I will:    Exercise  Get some form of exercise, every day. This will help reduce pain and release endorphins, the  feel good  chemicals in your brain. This is almost as good as taking antidepressants!  This is not the same as joining a gym and then never going! (they count on that by the way ) It can be as simple as just going for a walk or doing some gardening, anything that will get you moving.      Hygiene   Maintain good hygiene (Get out of bed in the morning, Make your bed, Brush your teeth, Take a shower, and Get dressed like you were going to work, even if you are unemployed).  If your clothes don't fit try to get ones that do.    Diet  I will strive to eat foods that are good for me, drink plenty of water, and avoid excessive sugar, caffeine, alcohol, and other mood-altering substances.  Some foods that are helpful in depression are: complex carbohydrates, B vitamins, flaxseed, fish or fish oil, fresh fruits and vegetables.    Psychotherapy  I agree to participate in Individual Therapy (if recommended).    Medication  If prescribed medications, I agree to take them.  Missing doses can result in serious side effects.  I  understand that drinking alcohol, or other illicit drug use, may cause potential side effects.  I will not stop my medication abruptly without first discussing it with my provider.    Staying Connected With Others  I will stay in touch with my friends, family members, and my primary care provider/team.    Use your imagination  Be creative.  We all have a creative side; it doesn t matter if it s oil painting, sand castles, or mud pies! This will also kick up the endorphins.    Witness Beauty  (AKA stop and smell the roses) Take a look outside, even in mid-winter. Notice colors, textures. Watch the squirrels and birds.     Service to others  Be of service to others.  There is always someone else in need.  By helping others we can  get out of ourselves  and remember the really important things.  This also provides opportunities for practicing all the other parts of the program.    Humor  Laugh and be silly!  Adjust your TV habits for less news and crime-drama and more comedy.    Control your stress  Try breathing deep, massage therapy, biofeedback, and meditation. Find time to relax each day.     My support system    Clinic Contact:  Phone number:    Contact 1:  Phone number:    Contact 2:  Phone number:    Baptism/:  Phone number:    Therapist:  Phone number:    Local crisis center:    Phone number:    Other community support:  Phone number:

## 2018-11-23 NOTE — PROGRESS NOTES
SUBJECTIVE:   Ashish Blas is a 38 year old male who presents to clinic today for the following health issues:    ENT Symptoms             Symptoms: cc Present Absent Comment   Fever/Chills   x    Fatigue  x     Muscle Aches  x     Eye Irritation   x    Sneezing  x     Nasal German/Drg  x     Sinus Pressure/Pain  x     Loss of smell   x    Dental pain   x    Sore Throat  x     Swollen Glands   x    Ear Pain/Fullness  x     Cough   x    Wheeze   x    Chest Pain   x    Shortness of breath   x    Rash   x    Other   x      Symptom duration:  three weeks   Symptom severity:  moderate   Treatments tried:  otc cold medicine   Contacts:  none         Problem list and histories reviewed & adjusted, as indicated.  Additional history: as documented    Patient Active Problem List   Diagnosis     Esophageal reflux     Anxiety     Mixed hyperlipidemia     Past Surgical History:   Procedure Laterality Date     ABDOMEN SURGERY  08/01/2009    lap nissen     APPENDECTOMY  10/15/2009    age 30 approx     COLONOSCOPY  2/17/2014    Procedure: COLONOSCOPY;  Colonoscopy  ;  Surgeon: Abdon Pagan MD;  Location: [facility]     COLONOSCOPY N/A 5/11/2016    Procedure: COLONOSCOPY;  Surgeon: Christian Malagon MD;  Location: [facility]     ENDOSCOPY      many since he was 18     procedure for acid reflux  08/15/2009       Social History   Substance Use Topics     Smoking status: Former Smoker     Years: 10.00     Types: Cigarettes     Start date: 7/19/2016     Quit date: 1/2/2017     Smokeless tobacco: Never Used      Comment: Quit date is 5/30/2016     Alcohol use Yes      Comment: occ     Family History   Problem Relation Age of Onset     Hypertension Mother      Diabetes Mother      Lipids Mother      Hyperlipidemia Mother      Lipids Father      Cancer Father      Non Hodgkins Lymphoma     Hyperlipidemia Father      Diabetes Maternal Grandmother      Hypertension Maternal Grandmother      Cerebrovascular Disease Maternal Grandmother       70     Cancer Maternal Grandmother      rectal cancer     HEART DISEASE Maternal Grandfather      later 50s     Breast Cancer Paternal Grandmother      Lipids Brother      Cancer Sister      skin cancer     Cancer - colorectal Brother 37     Hypothyroidism Brother      Thyroid Disease Brother          Current Outpatient Prescriptions   Medication Sig Dispense Refill     fish oil-omega-3 fatty acids 1000 MG capsule        sertraline (ZOLOFT) 100 MG tablet TAKE ONE TABLET BY MOUTH ONE TIME DAILY       VITAMIN B COMPLEX OR 1 TABLET ORALLY DAILY       ALPRAZolam (XANAX) 0.25 MG tablet Take 1 tablet (0.25 mg) by mouth 3 times daily as needed for anxiety (Patient not taking: Reported on 11/23/2018) 20 tablet 0     No Known Allergies  Recent Labs   Lab Test  08/12/18   1040  06/26/18   1713  02/15/18   1341  03/31/17   0823   01/20/14   0906   A1C   --   4.9   --    --    --    --    LDL   --    --    --   227*   --   202*   HDL   --    --    --   39*   --   35*   TRIG   --    --    --   93   --   158*   ALT  49  64  43  64   < >   --    CR  0.79  0.74  0.63*  0.74   < >   --    GFRESTIMATED  >90  >90  >90  >90  Non African American GFR Calc     < >   --    GFRESTBLACK  >90  >90  >90  >90  African American GFR Calc     < >   --    POTASSIUM  3.7  3.9  4.0  3.9   < >   --    TSH   --   1.42   --   0.99   --    --     < > = values in this interval not displayed.      BP Readings from Last 3 Encounters:   11/23/18 124/70   08/12/18 107/66   06/22/18 134/86    Wt Readings from Last 3 Encounters:   11/23/18 191 lb (86.6 kg)   08/12/18 165 lb (74.8 kg)   06/22/18 172 lb 6.4 oz (78.2 kg)                    Reviewed and updated as needed this visit by clinical staff       Reviewed and updated as needed this visit by Provider         ROS:  Constitutional, HEENT, cardiovascular, pulmonary, gi and gu systems are negative, except as otherwise noted.    OBJECTIVE:     /70 (BP Location: Right arm, Cuff Size: Adult Large)  Pulse  80  Temp 98.5  F (36.9  C) (Tympanic)  Resp 18  Ht 6' (1.829 m)  Wt 191 lb (86.6 kg)  BMI 25.9 kg/m2  Body mass index is 25.9 kg/(m^2).  GENERAL: healthy, alert and no distress  EYES: Eyes grossly normal to inspection, PERRL and conjunctivae and sclerae normal  HENT: ear canals and TM's normal, nose and mouth without ulcers or lesions Maxillary sinus tenderness, bilateral turbinates inflamed and edematous    NECK: no adenopathy, no asymmetry, masses, or scars and thyroid normal to palpation  RESP: lungs clear to auscultation - no rales, rhonchi or wheezes  CV: regular rate and rhythm, normal S1 S2, no S3 or S4, no murmur, click or rub, no peripheral edema and peripheral pulses strong  MS: no gross musculoskeletal defects noted, no edema  SKIN: no suspicious lesions or rashes  NEURO: Normal strength and tone, mentation intact and speech normal  PSYCH: mentation appears normal, affect normal/bright      ASSESSMENT/PLAN:   (J01.90) Acute sinusitis with symptoms > 10 days  (primary encounter diagnosis)  Comment:   Plan: fluticasone (FLONASE) 50 MCG/ACT spray,         amoxicillin-clavulanate (AUGMENTIN) 875-125 MG         per tablet           (R07.0) Throat pain  Comment:   Plan: Rapid strep screen, Beta strep group A culture          MARTHA Almaraz [facility]

## 2018-11-23 NOTE — MR AVS SNAPSHOT
After Visit Summary   11/23/2018    Ashish Blas    MRN: [id number]           Patient Information     Date Of Birth          1980        Visit Information        Provider Department      11/23/2018 4:20 PM Michelle Lyn APRN [facility]        Today's Diagnoses     Throat pain    -  1    Acute sinusitis with symptoms > 10 days          Care Instructions    Augmentin 875 bid for 10 days was prescribed.  Symptom Relief: Afdrin do not use greater then 3 days  Intranasal corticosteroid   Flonase has  been prescribed.     Tylenol or Ibuprofen if able to take for fevers and discomfort.  Do not exceed 4 grams of Tylenol in a 24 hour period.  Take Ibuprofen with food.      Follow up in 3-5 days if not improving or return sooner if worsening or fail to improve as anticipated.      Sinusitis (Antibiotic Treatment)    The sinuses are air-filled spaces within the bones of the face. They connect to the inside of the nose. Sinusitis is an inflammation of the tissue that lines the sinuses. Sinusitis can occur during a cold. It can also happen due to allergies to pollens and other particles in the air. Sinusitis can cause symptoms of sinus congestion and a feeling of fullness. A sinus infection causes fever, headache, and facial pain. There is often green or yellow fluid draining from the nose or into the back of the throat (post-nasal drip). You have been given antibiotics to treat this condition.  Home care    Take the full course of antibiotics as instructed. Do not stop taking them, even when you feel better.    Drink plenty of water, hot tea, and other liquids. This may help thin nasal mucus. It also may help your sinuses drain fluids.    Heat may help soothe painful areas of your face. Use a towel soaked in hot water. Or,  the shower and direct the warm spray onto your face. Using a vaporizer along with a menthol rub at night may also help soothe  symptoms.     An expectorant with guaifenesin may help thin nasal mucus and help your sinuses drain fluids.    You can use an over-the-counter decongestant, unless a similar medicine was prescribed to you. Nasal sprays work the fastest. Use one that contains phenylephrine or oxymetazoline. First blow your nose gently. Then use the spray. Do not use these medicines more often than directed on the label. If you do, your symptoms may get worse. You may also take pills that contain pseudoephedrine. Don t use products that combine multiple medicines. This is because side effects may be increased. Read labels. You can also ask the pharmacist for help. (People with high blood pressure should not use decongestants. They can raise blood pressure.)    Over-the-counter antihistamines may help if allergies contributed to your sinusitis.      Do not use nasal rinses or irrigation during an acute sinus infection, unless your healthcare provider tells you to. Rinsing may spread the infection to other areas in your sinuses.    Use acetaminophen or ibuprofen to control pain, unless another pain medicine was prescribed to you. If you have chronic liver or kidney disease or ever had a stomach ulcer, talk with your healthcare provider before using these medicines. (Aspirin should never be taken by anyone under age 18 who is ill with a fever. It may cause severe liver damage.)    Don't smoke. This can make symptoms worse.  Follow-up care  Follow up with your healthcare provider or our staff if you are better in 1 week.  When to seek medical advice  Call your healthcare provider if any of these occur:    Facial pain or headache that gets worse    Stiff neck    Unusual drowsiness or confusion    Swelling of your forehead or eyelids    Vision problems, such as blurred or double vision    Fever of 100.4 F (38 C) or higher, or as directed by your healthcare provider    Seizure    Breathing problems    Symptoms don't go away in 10  days  Prevention  Here are steps you can take to help prevent an infection:    Keep good hand washing habits.    Don t have close contact with people who have sore throats, colds, or other upper respiratory infections.    Don t smoke, and stay away from secondhand smoke.    Stay up to date with of your vaccines.  Date Last Reviewed: 11/1/2017 2000-2018 [employer]. [address] [place] [address]. All rights reserved. This information is not intended as a substitute for professional medical care. Always follow your healthcare professional's instructions.                Follow-ups after your visit        Who to contact     If you have questions or need follow up information about today's clinic visit or your schedule please contact [facility] directly at [phone].  Normal or non-critical lab and imaging results will be communicated to you by MyChart, letter or phone within 4 business days after the clinic has received the results. If you do not hear from us within 7 days, please contact the clinic through MyChart or phone. If you have a critical or abnormal lab result, we will notify you by phone as soon as possible.  Submit refill requests through [employer] or call your pharmacy and they will forward the refill request to us. Please allow 3 business days for your refill to be completed.          Additional Information About Your Visit        [employer] [facility] Information     [employer] gives you secure access to your electronic health record. If you see a primary care provider, you can also send messages to your care team and make appointments. If you have questions, please call your primary care clinic.  If you do not have a primary care provider, please call [phone] and they will assist you.        Care EveryWhere ID     This is your Care EveryWhere ID. This could be used by other organizations to access your [facility] medical records  [id number]        Your Vitals Were      Pulse Temperature Respirations Height BMI (Body Mass Index)       80 98.5  F (36.9  C) (Tympanic) 18 6' (1.829 m) 25.9 kg/m2        Blood Pressure from Last 3 Encounters:   11/23/18 124/70   08/12/18 107/66   06/22/18 134/86    Weight from Last 3 Encounters:   11/23/18 191 lb (86.6 kg)   08/12/18 165 lb (74.8 kg)   06/22/18 172 lb 6.4 oz (78.2 kg)              We Performed the Following     Beta strep group A culture     DEPRESSION ACTION PLAN (DAP)     Rapid strep screen          Today's Medication Changes          These changes are accurate as of 11/23/18  4:29 PM.  If you have any questions, ask your nurse or doctor.               Start taking these medicines.        Dose/Directions    amoxicillin-clavulanate 875-125 MG per tablet   Commonly known as:  AUGMENTIN   Used for:  Acute sinusitis with symptoms > 10 days   Started by:  Mcihelle Lyn APRN CNP        Dose:  1 tablet   Take 1 tablet by mouth 2 times daily   Quantity:  20 tablet   Refills:  0       fluticasone 50 MCG/ACT spray   Commonly known as:  FLONASE   Used for:  Acute sinusitis with symptoms > 10 days   Started by:  Michelle Lyn APRN CNP        Dose:  1-2 spray   Spray 1-2 sprays into both nostrils daily   Quantity:  16 g   Refills:  0            Where to get your medicines      These medications were sent to [id number] [address] [facility]  [address] [facility] [address]    Hours:  Closed 10-16-08 business to [facility] [place] [facility] Phone:  [phone]     amoxicillin-clavulanate 875-125 MG per tablet    fluticasone 50 MCG/ACT spray                Primary Care Provider Office Phone # Fax #    Kimmie Pack [phone] [address] [facility] [address]        Equal Access to Services     [facility] AH: Nba Browning, waandrew luqmandi, qaybbravo kaaljane nicole. So [facility] [phone].    ATENCIÓN: Si jules hope,  tiene a powell disposición servicios gratuitos de asistencia lingüística. Alpa moser [phone].    We comply with applicable federal civil rights laws and [place] laws. We do not discriminate on the basis of race, color, national origin, age, disability, sex, sexual orientation, or gender identity.            Thank you!     Thank you for choosing [facility]  for your care. Our goal is always to provide you with excellent care. Hearing back from our patients is one way we can continue to improve our services. Please take a few minutes to complete the written survey that you may receive in the mail after your visit with us. Thank you!             Your Updated Medication List - Protect others around you: Learn how to safely use, store and throw away your medicines at www.disposemymeds.org.          This list is accurate as of 11/23/18  4:29 PM.  Always use your most recent med list.                   Brand Name Dispense Instructions for use Diagnosis    ALPRAZolam 0.25 MG tablet    XANAX    20 tablet    Take 1 tablet (0.25 mg) by mouth 3 times daily as needed for anxiety    Anxiety       amoxicillin-clavulanate 875-125 MG per tablet    AUGMENTIN    20 tablet    Take 1 tablet by mouth 2 times daily    Acute sinusitis with symptoms > 10 days       fish oil-omega-3 fatty acids 1000 MG capsule           fluticasone 50 MCG/ACT spray    FLONASE    16 g    Spray 1-2 sprays into both nostrils daily    Acute sinusitis with symptoms > 10 days       sertraline 100 MG tablet    ZOLOFT     TAKE ONE TABLET BY MOUTH ONE TIME DAILY        VITAMIN B COMPLEX PO      1 TABLET ORALLY DAILY

## 2018-11-23 NOTE — PATIENT INSTRUCTIONS
Augmentin 875 bid for 10 days was prescribed.  Symptom Relief: Afdrin do not use greater then 3 days  Intranasal corticosteroid   Flonase has  been prescribed.     Tylenol or Ibuprofen if able to take for fevers and discomfort.  Do not exceed 4 grams of Tylenol in a 24 hour period.  Take Ibuprofen with food.      Follow up in 3-5 days if not improving or return sooner if worsening or fail to improve as anticipated.      Sinusitis (Antibiotic Treatment)    The sinuses are air-filled spaces within the bones of the face. They connect to the inside of the nose. Sinusitis is an inflammation of the tissue that lines the sinuses. Sinusitis can occur during a cold. It can also happen due to allergies to pollens and other particles in the air. Sinusitis can cause symptoms of sinus congestion and a feeling of fullness. A sinus infection causes fever, headache, and facial pain. There is often green or yellow fluid draining from the nose or into the back of the throat (post-nasal drip). You have been given antibiotics to treat this condition.  Home care    Take the full course of antibiotics as instructed. Do not stop taking them, even when you feel better.    Drink plenty of water, hot tea, and other liquids. This may help thin nasal mucus. It also may help your sinuses drain fluids.    Heat may help soothe painful areas of your face. Use a towel soaked in hot water. Or,  the shower and direct the warm spray onto your face. Using a vaporizer along with a menthol rub at night may also help soothe symptoms.     An expectorant with guaifenesin may help thin nasal mucus and help your sinuses drain fluids.    You can use an over-the-counter decongestant, unless a similar medicine was prescribed to you. Nasal sprays work the fastest. Use one that contains phenylephrine or oxymetazoline. First blow your nose gently. Then use the spray. Do not use these medicines more often than directed on the label. If you do, your symptoms  may get worse. You may also take pills that contain pseudoephedrine. Don t use products that combine multiple medicines. This is because side effects may be increased. Read labels. You can also ask the pharmacist for help. (People with high blood pressure should not use decongestants. They can raise blood pressure.)    Over-the-counter antihistamines may help if allergies contributed to your sinusitis.      Do not use nasal rinses or irrigation during an acute sinus infection, unless your healthcare provider tells you to. Rinsing may spread the infection to other areas in your sinuses.    Use acetaminophen or ibuprofen to control pain, unless another pain medicine was prescribed to you. If you have chronic liver or kidney disease or ever had a stomach ulcer, talk with your healthcare provider before using these medicines. (Aspirin should never be taken by anyone under age 18 who is ill with a fever. It may cause severe liver damage.)    Don't smoke. This can make symptoms worse.  Follow-up care  Follow up with your healthcare provider or our staff if you are better in 1 week.  When to seek medical advice  Call your healthcare provider if any of these occur:    Facial pain or headache that gets worse    Stiff neck    Unusual drowsiness or confusion    Swelling of your forehead or eyelids    Vision problems, such as blurred or double vision    Fever of 100.4 F (38 C) or higher, or as directed by your healthcare provider    Seizure    Breathing problems    Symptoms don't go away in 10 days  Prevention  Here are steps you can take to help prevent an infection:    Keep good hand washing habits.    Don t have close contact with people who have sore throats, colds, or other upper respiratory infections.    Don t smoke, and stay away from secondhand smoke.    Stay up to date with of your vaccines.  Date Last Reviewed: 11/1/2017 2000-2018 [employer]. [address] [place] [address], [place] [address]. All rights  reserved. This information is not intended as a substitute for professional medical care. Always follow your healthcare professional's instructions.

## 2018-11-24 LAB
BACTERIA SPEC CULT: NORMAL
SPECIMEN SOURCE: NORMAL

## 2018-11-29 ENCOUNTER — MYC MEDICAL ADVICE (OUTPATIENT)
Dept: FAMILY MEDICINE | Facility: CLINIC | Age: 38
End: 2018-11-29

## 2018-11-29 DIAGNOSIS — R07.0 THROAT PAIN: Primary | ICD-10-CM

## 2018-12-07 ENCOUNTER — OFFICE VISIT (OUTPATIENT)
Dept: FAMILY MEDICINE | Facility: CLINIC | Age: 38
End: 2018-12-07
Payer: COMMERCIAL

## 2018-12-07 VITALS
OXYGEN SATURATION: 100 % | DIASTOLIC BLOOD PRESSURE: 70 MMHG | SYSTOLIC BLOOD PRESSURE: 120 MMHG | RESPIRATION RATE: 16 BRPM | HEART RATE: 54 BPM | HEIGHT: 72 IN | BODY MASS INDEX: 26.01 KG/M2 | WEIGHT: 192 LBS | TEMPERATURE: 97.3 F

## 2018-12-07 DIAGNOSIS — F41.9 ANXIETY: Primary | ICD-10-CM

## 2018-12-07 DIAGNOSIS — F32.0 MILD MAJOR DEPRESSION (H): ICD-10-CM

## 2018-12-07 PROCEDURE — 99214 OFFICE O/P EST MOD 30 MIN: CPT | Performed by: NURSE PRACTITIONER

## 2018-12-07 RX ORDER — CITALOPRAM HYDROBROMIDE 20 MG/1
20 TABLET ORAL DAILY
Qty: 90 TABLET | Refills: 1 | Status: SHIPPED | OUTPATIENT
Start: 2018-12-07 | End: 2020-10-27 | Stop reason: SINTOL

## 2018-12-07 RX ORDER — ALPRAZOLAM 0.25 MG
0.25 TABLET ORAL 3 TIMES DAILY PRN
Qty: 20 TABLET | Refills: 0 | Status: SHIPPED | OUTPATIENT
Start: 2018-12-07 | End: 2019-01-16

## 2018-12-07 ASSESSMENT — ANXIETY QUESTIONNAIRES
1. FEELING NERVOUS, ANXIOUS, OR ON EDGE: NEARLY EVERY DAY
2. NOT BEING ABLE TO STOP OR CONTROL WORRYING: NEARLY EVERY DAY
6. BECOMING EASILY ANNOYED OR IRRITABLE: NEARLY EVERY DAY
GAD7 TOTAL SCORE: 18
3. WORRYING TOO MUCH ABOUT DIFFERENT THINGS: NEARLY EVERY DAY
7. FEELING AFRAID AS IF SOMETHING AWFUL MIGHT HAPPEN: NEARLY EVERY DAY
5. BEING SO RESTLESS THAT IT IS HARD TO SIT STILL: MORE THAN HALF THE DAYS

## 2018-12-07 ASSESSMENT — PATIENT HEALTH QUESTIONNAIRE - PHQ9
SUM OF ALL RESPONSES TO PHQ QUESTIONS 1-9: 15
5. POOR APPETITE OR OVEREATING: SEVERAL DAYS

## 2018-12-07 NOTE — PROGRESS NOTES
"  SUBJECTIVE:   Ashish Blas is a 38 year old male who presents to clinic today for the following health issues:      Depression and Anxiety Follow-Up    Status since last visit: Worsened could not tolerate the Zoloft due to causing heartburn    Other associated symptoms:see above    Complicating factors:     Significant life event: No     Current substance abuse: None    PHQ 5/27/2016 3/31/2017 12/7/2018   PHQ-9 Total Score 6 3 15   Q9: Suicide Ideation Not at all Not at all Not at all     CELSO-7 SCORE 5/27/2016 3/31/2017 12/7/2018   Total Score - - -   Total Score 11 5 18     In the past two weeks have you had thoughts of suicide or self-harm?  No.    Do you have concerns about your personal safety or the safety of others?   No  PHQ-9  English  PHQ-9   Any Language  CELSO-7  Suicide Assessment Five-step Evaluation and Treatment (SAFE-T)    Amount of exercise or physical activity: 2-3 days/week for an average of 15-30 minutes    Problems taking medications regularly: Yes,  side effects    Medication side effects: heartburn with the Zoloft    Diet: regular (no restrictions)            Problem list and histories reviewed & adjusted, as indicated.  Additional history: states he had been on Zoloft but stopped it due to side effects, he was having a lot of heartburn which has resolved since stopping medication.  He has worsening anxiety and mild depression symptoms since then. He uses xanax as needed for panic attacks.  States he has his first panic attack at age 21 and has struggled with his mood since turning 32.  He is unsure of other medications he's tried but in his EMR looks like he's tried Citalopram in the past. He cannot recall any adverse reactions to that. He stops medications because he feels well.  He reports worrying about \"everything\" can't go 5 minutes without worrying.  He would be open to trying counseling.  He is refusing flu shot.    Patient Active Problem List   Diagnosis     Esophageal reflux     " Anxiety     Mixed hyperlipidemia     Past Surgical History:   Procedure Laterality Date     ABDOMEN SURGERY  08/01/2009    lap nissen     APPENDECTOMY  10/15/2009    age 30 approx     COLONOSCOPY  2/17/2014    Procedure: COLONOSCOPY;  Colonoscopy  ;  Surgeon: Abdon Pagan MD;  Location: [facility]     COLONOSCOPY N/A 5/11/2016    Procedure: COLONOSCOPY;  Surgeon: Christian Malagon MD;  Location: [facility]     ENDOSCOPY      many since he was 18     procedure for acid reflux  08/15/2009       Social History   Substance Use Topics     Smoking status: Former Smoker     Years: 10.00     Types: Cigarettes     Start date: 7/19/2016     Quit date: 1/2/2017     Smokeless tobacco: Never Used      Comment: Quit date is 5/30/2016     Alcohol use Yes      Comment: occ     Family History   Problem Relation Age of Onset     Hypertension Mother      Diabetes Mother      Lipids Mother      Hyperlipidemia Mother      Lipids Father      Cancer Father      Non Hodgkins Lymphoma     Hyperlipidemia Father      Diabetes Maternal Grandmother      Hypertension Maternal Grandmother      Cerebrovascular Disease Maternal Grandmother      70     Cancer Maternal Grandmother      rectal cancer     Heart Disease Maternal Grandfather      later 50s     Breast Cancer Paternal Grandmother      Lipids Brother      Cancer Sister      skin cancer     Cancer - colorectal Brother 37     Hypothyroidism Brother      Thyroid Disease Brother          Current Outpatient Prescriptions   Medication Sig Dispense Refill     ALPRAZolam (XANAX) 0.25 MG tablet Take 1 tablet (0.25 mg) by mouth 3 times daily as needed for anxiety 20 tablet 0     citalopram (CELEXA) 20 MG tablet Take 1 tablet (20 mg) by mouth daily 90 tablet 1     fish oil-omega-3 fatty acids 1000 MG capsule        VITAMIN B COMPLEX OR 1 TABLET ORALLY DAILY       sertraline (ZOLOFT) 100 MG tablet TAKE ONE TABLET BY MOUTH ONE TIME DAILY       No Known Allergies  BP Readings from Last 3 Encounters:    12/07/18 120/70   11/23/18 124/70   08/12/18 107/66    Wt Readings from Last 3 Encounters:   12/07/18 192 lb (87.1 kg)   11/23/18 191 lb (86.6 kg)   08/12/18 165 lb (74.8 kg)                    Reviewed and updated as needed this visit by clinical staff  Tobacco  Allergies  Meds  Problems  Med Hx  Surg Hx  Fam Hx  Soc Hx        Reviewed and updated as needed this visit by Provider  Allergies  Meds  Problems          ROS: 10 point ROS neg other than the symptoms noted above in the HPI.    OBJECTIVE:     /70 (BP Location: Right arm, Patient Position: Chair, Cuff Size: Adult Large)  Pulse 54  Temp 97.3  F (36.3  C) (Oral)  Resp 16  Ht 6' (1.829 m)  Wt 192 lb (87.1 kg)  SpO2 100%  BMI 26.04 kg/m2  Body mass index is 26.04 kg/(m^2).  GENERAL: healthy, alert and no distress  NECK: no adenopathy, no asymmetry  RESP: lungs clear to auscultation - no rales, rhonchi or wheezes  CV: regular rate and rhythm  MS: no gross musculoskeletal defects noted  PSYCH: pleasant      Diagnostic Test Results:  No results found for this or any previous visit (from the past 24 hour(s)).    ASSESSMENT/PLAN:             1. Anxiety    - ALPRAZolam (XANAX) 0.25 MG tablet; Take 1 tablet (0.25 mg) by mouth 3 times daily as needed for anxiety  Dispense: 20 tablet; Refill: 0  - citalopram (CELEXA) 20 MG tablet; Take 1 tablet (20 mg) by mouth daily  Dispense: 90 tablet; Refill: 1  - MENTAL HEALTH REFERRAL  - Adult; Outpatient Treatment; Individual/Couples/Family/Group Therapy/Health Psychology; [facility]: [facility] [phone]; We will contact you to schedule the appointment or please call with any questions  Discussed how to take the medication(s), expected outcomes, potential side effects.    2. Mild major depression (H)    - citalopram (CELEXA) 20 MG tablet; Take 1 tablet (20 mg) by mouth daily  Dispense: 90 tablet; Refill: 1  - MENTAL HEALTH REFERRAL  - Adult; Outpatient Treatment;  Individual/Couples/Family/Group Therapy/Health Psychology; [facility]: [facility] [phone]; We will contact you to schedule the appointment or please call with any questions    See Patient Instructions  Patient Instructions   Try the Citalopram daily.  Use Xanax as needed.  Start counseling and will see you back for recheck in 4 weeks, sooner if any concerns.        Thank you for choosing [facility].  You may be receiving a survey in the mail from [employer] regarding your visit today.  Please take a few minutes to complete and return the survey to let us know how we are doing.      Our Clinic hours are:  Mondays    7:20 am - 7 pm  Tues -  Fri  7:20 am - 5 pm    Clinic Phone: [phone]    The clinic lab opens at 7:30 am Mon - Fri and appointments are required.    [facility]  Ph. [phone]  Monday  8 am - 7pm  Tues - Fri 8 am - 5:30 pm             MARTHA Rayo CNP  [facility]

## 2018-12-07 NOTE — PATIENT INSTRUCTIONS
Try the Citalopram daily.  Use Xanax as needed.  Start counseling and will see you back for recheck in 4 weeks, sooner if any concerns.        Thank you for choosing [facility].  You may be receiving a survey in the mail from Taya Mendes regarding your visit today.  Please take a few minutes to complete and return the survey to let us know how we are doing.      Our Clinic hours are:  Mondays    7:20 am - 7 pm  Tues -  Fri  7:20 am - 5 pm    Clinic Phone: [phone]    The clinic lab opens at 7:30 am Mon - Fri and appointments are required.    [facility]  Ph. [phone]  Monday  8 am - 7pm  Tues - Fri 8 am - 5:30 pm

## 2018-12-07 NOTE — MR AVS SNAPSHOT
After Visit Summary   12/7/2018    Ashish Blas    MRN: [id number]           Patient Information     Date Of Birth          1980        Visit Information        Provider Department      12/7/2018 12:40 PM Dina Gaviria APRN [facility]        Today's Diagnoses     Anxiety    -  1    Mild major depression (H)          Care Instructions    Try the Citalopram daily.  Use Xanax as needed.  Start counseling and will see you back for recheck in 4 weeks, sooner if any concerns.        Thank you for choosing [facility].  You may be receiving a survey in the mail from [employer] regarding your visit today.  Please take a few minutes to complete and return the survey to let us know how we are doing.      Our Clinic hours are:  Mondays    7:20 am - 7 pm  Tues - Fri  7:20 am - 5 pm    Clinic Phone: [phone]    The clinic lab opens at 7:30 am Mon - Fri and appointments are required.    [facility]  Ph. [phone]  Monday  8 am - 7pm  Tues - Fri 8 am - 5:30 pm                 Follow-ups after your visit        Additional Services     MENTAL HEALTH REFERRAL  - Adult; Outpatient Treatment; Individual/Couples/Family/Group Therapy/Health Psychology; [facility] [phone]; We will contact you to schedule the appointment or please call with any questions       All scheduling is subject to the client's specific insurance plan & benefits, provider/location availability, and provider clinical specialities.  Please arrive 15 minutes early for your first appointment and bring your completed paperwork.    Please be aware that coverage of these services is subject to the terms and limitations of your health insurance plan.  Call member services at your health plan with any benefit or coverage questions.                            Follow-up notes from your care team     Return in about 4 weeks (around 1/4/2019) for Med Check, Routine  Visit, or sooner if symptoms persist or worsen.      Who to contact     If you have questions or need follow up information about today's clinic visit or your schedule please contact [facility] directly at [phone].  Normal or non-critical lab and imaging results will be communicated to you by MyChart, letter or phone within 4 business days after the clinic has received the results. If you do not hear from us within 7 days, please contact the clinic through [employer] or phone. If you have a critical or abnormal lab result, we will notify you by phone as soon as possible.  Submit refill requests through [employer] or call your pharmacy and they will forward the refill request to us. Please allow 3 business days for your refill to be completed.          Additional Information About Your Visit        [employer] Information     [employer] gives you secure access to your electronic health record. If you see a primary care provider, you can also send messages to your care team and make appointments. If you have questions, please call your primary care clinic.  If you do not have a primary care provider, please call [phone] and they will assist you.        Care EveryWhere ID     This is your Care EveryWhere ID. This could be used by other organizations to access your [place] medical records  [id number]        Your Vitals Were     Pulse Temperature Respirations Height Pulse Oximetry BMI (Body Mass Index)    54 97.3  F (36.3  C) (Oral) 16 6' (1.829 m) 100% 26.04 kg/m2       Blood Pressure from Last 3 Encounters:   12/07/18 120/70   11/23/18 124/70   08/12/18 107/66    Weight from Last 3 Encounters:   12/07/18 192 lb (87.1 kg)   11/23/18 191 lb (86.6 kg)   08/12/18 165 lb (74.8 kg)              We Performed the Following     MENTAL HEALTH REFERRAL  - Adult; Outpatient Treatment; Individual/Couples/Family/Group Therapy/Health Psychology; [facility]: [facility] [phone]; We will contact you  to schedule the appointment or please call with any questions          Today's Medication Changes          These changes are accurate as of 12/7/18  1:03 PM.  If you have any questions, ask your nurse or doctor.               Start taking these medicines.        Dose/Directions    citalopram 20 MG tablet   Commonly known as:  celeXA   Used for:  Anxiety, Mild major depression (H)   Started by:  Dina Gaviria APRN CNP        Dose:  20 mg   Take 1 tablet (20 mg) by mouth daily   Quantity:  90 tablet   Refills:  1            Where to get your medicines      These medications were sent to [id number] - [place], [place] - [address]  [address] [address]    Hours:  Closed 10-16-08 business to [facility] Phone:  [phone]     citalopram 20 MG tablet         Some of these will need a paper prescription and others can be bought over the counter.  Ask your nurse if you have questions.     Bring a paper prescription for each of these medications     ALPRAZolam 0.25 MG tablet                Primary Care Provider Office Phone # Fax #    Sauravmiguel Jennifer Pack [phone] [address] [address]        Equal Access to Services     SHANI CHEUNG AH: Hadii demian thomaso Sojohnny, waaxda luqadaha, qaybta kaalmada adeegyada, jane nolan. So [facility] [phone].    ATENCIÓN: Si habla español, tiene a powell disposición servicios gratuitos de asistencia lingüística. Alpa al [phone].    We comply with applicable federal civil rights laws and [place] laws. We do not discriminate on the basis of race, color, national origin, age, disability, sex, sexual orientation, or gender identity.            Thank you!     Thank you for choosing [facility]  for your care. Our goal is always to provide you with excellent care. Hearing back from our patients is one way we can continue to improve our services.  Please take a few minutes to complete the written survey that you may receive in the mail after your visit with us. Thank you!             Your Updated Medication List - Protect others around you: Learn how to safely use, store and throw away your medicines at www.disposemymeds.org.          This list is accurate as of 12/7/18  1:03 PM.  Always use your most recent med list.                   Brand Name Dispense Instructions for use Diagnosis    ALPRAZolam 0.25 MG tablet    XANAX    20 tablet    Take 1 tablet (0.25 mg) by mouth 3 times daily as needed for anxiety    Anxiety       citalopram 20 MG tablet    celeXA    90 tablet    Take 1 tablet (20 mg) by mouth daily    Anxiety, Mild major depression (H)       fish oil-omega-3 fatty acids 1000 MG capsule           sertraline 100 MG tablet    ZOLOFT     TAKE ONE TABLET BY MOUTH ONE TIME DAILY        VITAMIN B COMPLEX PO      1 TABLET ORALLY DAILY

## 2018-12-08 ASSESSMENT — ANXIETY QUESTIONNAIRES: GAD7 TOTAL SCORE: 18

## 2018-12-10 PROBLEM — F32.0 MILD MAJOR DEPRESSION (H): Status: ACTIVE | Noted: 2018-12-10

## 2019-01-16 ENCOUNTER — MYC REFILL (OUTPATIENT)
Dept: FAMILY MEDICINE | Facility: CLINIC | Age: 39
End: 2019-01-16

## 2019-01-16 DIAGNOSIS — F41.9 ANXIETY: ICD-10-CM

## 2019-01-17 ENCOUNTER — NURSE TRIAGE (OUTPATIENT)
Dept: NURSING | Facility: CLINIC | Age: 39
End: 2019-01-17

## 2019-01-17 NOTE — TELEPHONE ENCOUNTER
Left message for patient to return call to clinic.  Is he out of xanax?  RX was dated 12-7-18 for #20.  His RX prior to that was 1-25-18 for #20.  He was to recheck in clinic in 4 weeks from LOV 12-7-18.    Patient Instructions   Try the Citalopram daily.  Use Xanax as needed.  Start counseling and will see you back for recheck in 4 weeks, sooner if any concerns.       Sanjana THURSTON RN

## 2019-01-17 NOTE — TELEPHONE ENCOUNTER
"Patient called stating he is attempting to follow up with his PCP re a request 1/16/19 for his \"Zanax refill.\"  Currently states he has 2-3 tablets left and will follow up with the clinic tomorrow.  States he has not followed up with counseling as discussed at 12/7/18 office visit and would like assistance with a referral.    Protocol- Medication Question- Adult   Care advice reviewed.   Disposition- Call PCP within 24 hours  Caller states understanding of the recommended disposition.   Advised to call PCP 1/20/19.  Advised to call back if further questions or concerns.     CHEY FuN RN  [facility]     Reason for Disposition    Caller has NON-URGENT medication question about med that PCP prescribed and triager unable to answer question    Protocols used: MEDICATION QUESTION CALL-ADULT-      "

## 2019-01-18 RX ORDER — ALPRAZOLAM 0.25 MG
0.25 TABLET ORAL 3 TIMES DAILY PRN
Qty: 20 TABLET | Refills: 0 | Status: SHIPPED | OUTPATIENT
Start: 2019-01-18 | End: 2020-10-27

## 2019-01-18 NOTE — TELEPHONE ENCOUNTER
Prescription printed please fax and notify patient. Needs to be seen for office visit to discuss medications and plan.

## 2019-01-18 NOTE — TELEPHONE ENCOUNTER
"Patient called back. He states he only has one pill left of the Xanax, which is working well as needed.     He states the Celexa is \"eh, not going too well\" and he did set up an OV for 2/8/2019 to discuss this.    Routing refill request to Dr. Pack for review/approval because:  Drug not on the [facility] refill protocol     Thanks,  Arabella PERDOMO RN      "

## 2019-02-12 ENCOUNTER — OFFICE VISIT (OUTPATIENT)
Dept: URGENT CARE | Facility: URGENT CARE | Age: 39
End: 2019-02-12
Payer: COMMERCIAL

## 2019-02-12 VITALS
HEART RATE: 144 BPM | WEIGHT: 197.8 LBS | BODY MASS INDEX: 26.83 KG/M2 | DIASTOLIC BLOOD PRESSURE: 62 MMHG | OXYGEN SATURATION: 95 % | TEMPERATURE: 99.4 F | SYSTOLIC BLOOD PRESSURE: 116 MMHG

## 2019-02-12 DIAGNOSIS — R05.9 COUGH: ICD-10-CM

## 2019-02-12 DIAGNOSIS — J06.9 VIRAL URI: Primary | ICD-10-CM

## 2019-02-12 LAB
DEPRECATED S PYO AG THROAT QL EIA: NORMAL
FLUAV+FLUBV AG SPEC QL: NEGATIVE
FLUAV+FLUBV AG SPEC QL: NEGATIVE
SPECIMEN SOURCE: NORMAL
SPECIMEN SOURCE: NORMAL

## 2019-02-12 PROCEDURE — 87880 STREP A ASSAY W/OPTIC: CPT | Performed by: FAMILY MEDICINE

## 2019-02-12 PROCEDURE — 87804 INFLUENZA ASSAY W/OPTIC: CPT | Performed by: FAMILY MEDICINE

## 2019-02-12 PROCEDURE — 99213 OFFICE O/P EST LOW 20 MIN: CPT | Performed by: FAMILY MEDICINE

## 2019-02-12 PROCEDURE — 87081 CULTURE SCREEN ONLY: CPT | Performed by: FAMILY MEDICINE

## 2019-02-12 NOTE — PROGRESS NOTES
SUBJECTIVE:   Ashish Blas is a 38 year old male who presents to clinic today for the following health issues:    Chief Complaint   Patient presents with     URI     Started  with very sore throat- glass broke inside.  Has moved to sinuses and very painful.  No energy, coughing.  Worse today then yesterday.  Has tried Ibuprofen        Duration: 4 days     Description (location/character/radiation): sore throat, congestion, cough, bodyache,     Intensity:  moderate    Accompanying signs and symptoms: no fever, runny nose, chest pain, sob    History (similar episodes/previous evaluation): None    Precipitating or alleviating factors: None    Therapies tried and outcome: OTC analgesia          Problem list and histories reviewed & adjusted, as indicated.  Additional history: as documented    Patient Active Problem List   Diagnosis     Esophageal reflux     Anxiety     Mixed hyperlipidemia     Vitamin D deficiency     Mild major depression (H)     Past Surgical History:   Procedure Laterality Date     ABDOMEN SURGERY  2009    lap nissen     APPENDECTOMY  10/15/2009    age 30 approx     COLONOSCOPY  2014    Procedure: COLONOSCOPY;  Colonoscopy  ;  Surgeon: Abdon Pagan MD;  Location: [facility]     COLONOSCOPY N/A 2016    Procedure: COLONOSCOPY;  Surgeon: Christian Malagon MD;  Location: [facility]     ENDOSCOPY      many since he was 18     procedure for acid reflux  08/15/2009       Social History     Tobacco Use     Smoking status: Former Smoker     Years: 10.00     Types: Cigarettes     Start date: 2016     Last attempt to quit: 2017     Years since quittin.1     Smokeless tobacco: Never Used     Tobacco comment: Quit date is 2016   Substance Use Topics     Alcohol use: Yes     Comment: occ     Family History   Problem Relation Age of Onset     Hypertension Mother      Diabetes Mother      Lipids Mother      Hyperlipidemia Mother      Lipids Father      Cancer Father          Non Hodgkins Lymphoma     Hyperlipidemia Father      Diabetes Maternal Grandmother      Hypertension Maternal Grandmother      Cerebrovascular Disease Maternal Grandmother         70     Cancer Maternal Grandmother         rectal cancer     Heart Disease Maternal Grandfather         later 50s     Breast Cancer Paternal Grandmother      Lipids Brother      Cancer Sister         skin cancer     Cancer - colorectal Brother 37     Hypothyroidism Brother      Thyroid Disease Brother          Current Outpatient Medications   Medication Sig Dispense Refill     ALPRAZolam (XANAX) 0.25 MG tablet Take 1 tablet (0.25 mg) by mouth 3 times daily as needed for anxiety 20 tablet 0     citalopram (CELEXA) 20 MG tablet Take 1 tablet (20 mg) by mouth daily 90 tablet 1     fish oil-omega-3 fatty acids 1000 MG capsule        VITAMIN B COMPLEX OR 1 TABLET ORALLY DAILY       sertraline (ZOLOFT) 100 MG tablet TAKE ONE TABLET BY MOUTH ONE TIME DAILY       No Known Allergies  Recent Labs   Lab Test 08/12/18  1040 06/26/18  1713 02/15/18  1341 03/31/17  0823  01/20/14  0906   A1C  --  4.9  --   --   --   --    LDL  --   --   --  227*  --  202*   HDL  --   --   --  39*  --  35*   TRIG  --   --   --  93  --  158*   ALT 49 64 43 64   < >  --    CR 0.79 0.74 0.63* 0.74   < >  --    GFRESTIMATED >90 >90 >90 >90  Non African American GFR Calc     < >  --    GFRESTBLACK >90 >90 >90 >90  African American GFR Calc     < >  --    POTASSIUM 3.7 3.9 4.0 3.9   < >  --    TSH  --  1.42  --  0.99  --   --     < > = values in this interval not displayed.      BP Readings from Last 3 Encounters:   02/12/19 116/62   12/07/18 120/70   11/23/18 124/70    Wt Readings from Last 3 Encounters:   02/12/19 89.7 kg (197 lb 12.8 oz)   12/07/18 87.1 kg (192 lb)   11/23/18 86.6 kg (191 lb)                  Labs reviewed in EPIC    Reviewed and updated as needed this visit by clinical staff  Tobacco  Allergies  Meds  Med Hx  Surg Hx  Fam Hx  Soc Hx      Reviewed  and updated as needed this visit by Provider         ROS:  Constitutional, HEENT, cardiovascular, pulmonary, gi and gu systems are negative, except as otherwise noted.    OBJECTIVE:     /62 (BP Location: Right arm, Patient Position: Chair, Cuff Size: Adult Regular)   Pulse 144   Temp 99.4  F (37.4  C) (Tympanic)   Wt 89.7 kg (197 lb 12.8 oz)   SpO2 95%   BMI 26.83 kg/m    Body mass index is 26.83 kg/m .  GENERAL: healthy, alert and no distress  EYES: Eyes grossly normal to inspection, PERRL and conjunctivae and sclerae normal  HENT: normal cephalic/atraumatic, ear canals and TM's normal, nasal mucosa edematous , oral mucous membranes moist, oropharxnx crowded and sinuses: not tender  NECK: no adenopathy, no asymmetry, masses, or scars and thyroid normal to palpation  RESP: lungs clear to auscultation - no rales, rhonchi or wheezes  CV: regular rates and rhythm, normal S1 S2, no S3 or S4 and no murmur, click or rub  MS: no gross musculoskeletal defects noted, no edema    Diagnostic Test Results:  Results for orders placed or performed in visit on 02/12/19 (from the past 24 hour(s))   Influenza A/B antigen   Result Value Ref Range    Influenza A/B Agn Specimen Nasal     Influenza A Negative NEG^Negative    Influenza B Negative NEG^Negative   Strep, Rapid Screen   Result Value Ref Range    Specimen Description Throat     Rapid Strep A Screen       NEGATIVE: No Group A streptococcal antigen detected by immunoassay, await culture report.       ASSESSMENT/PLAN:         ICD-10-CM    1. Viral URI J06.9    2. Cough R05 Influenza A/B antigen     Strep, Rapid Screen     Beta strep group A culture       Discussed in detail differentials and further management. Symptoms are likely secondary to viral infection. Recommended well hydration, over-the-counter analgesia, warm fluids and saline gargles. Follow up if symptoms persist or worsen. Written instructions/information provided. Patient understood and in agreement  with the above plan. All questions are answered.           Patient Instructions       Patient Education     Viral Upper Respiratory Illness (Adult)  You have a viral upper respiratory illness (URI), which is another term for the common cold. This illness is contagious during the first few days. It is spread through the air by coughing and sneezing. It may also be spread by direct contact (touching the sick person and then touching your own eyes, nose, or mouth). Frequent handwashing will decrease risk of spread. Most viral illnesses go away within 7 to 10 days with rest and simple home remedies. Sometimes the illness may last for several weeks. Antibiotics will not kill a virus, and they are generally not prescribed for this condition.    Home care    If symptoms are severe, rest at home for the first 2 to 3 days. When you resume activity, don't let yourself get too tired.    Don't smoke. If you need help stopping, talk with your healthcare provider.    Avoid being exposed to cigarette smoke (yours or others ).    You may use acetaminophen or ibuprofen to control pain and fever, unless another medicine was prescribed. If you have chronic liver or kidney disease, have ever had a stomach ulcer or gastrointestinal bleeding, or are taking blood-thinning medicines, talk with your healthcare provider before using these medicines. Aspirin should never be given to anyone under 18 years of age who is ill with a viral infection or fever. It may cause severe liver or brain damage.    Your appetite may be poor, so a light diet is fine. Stay well hydrated by drinking 6 to 8 glasses of fluids per day (water, soft drinks, juices, tea, or soup). Extra fluids will help loosen secretions in the nose and lungs.    Over-the-counter cold medicines will not shorten the length of time you re sick, but they may be helpful for the following symptoms: cough, sore throat, and nasal and sinus congestion. If you take prescription medicines, ask  your healthcare provider or pharmacist which over-the-counter medicines are safe to use. (Note: Don't use decongestants if you have high blood pressure.)  Follow-up care  Follow up with your healthcare provider, or as advised.  When to seek medical advice  Call your healthcare provider right away if any of these occur:    Cough with lots of colored sputum (mucus)    Severe headache; face, neck, or ear pain    Difficulty swallowing due to throat pain    Fever of 100.4 F (38 C) or higher, or as directed by your healthcare provider  Call 911  Call 911 if any of these occur:    Chest pain, shortness of breath, wheezing, or difficulty breathing    Coughing up blood    Very severe pain with swallowing, especially if it goes along with a muffled voice   Date Last Reviewed: 6/1/2018 2000-2018 [employer]. [address] [place] [address], [place] [id number]. All rights reserved. This information is not intended as a substitute for professional medical care. Always follow your healthcare professional's instructions.               Sandro Finnegan MD  [facility]

## 2019-02-12 NOTE — NURSING NOTE
Chief Complaint   Patient presents with     URI     Started Sunday with very sore throat- glass broke inside.  Has moved to sinuses and very painful.  No energy, coughing.  Worse today then yesterday.  Has tried Ibuprofen        Initial /62 (BP Location: Right arm, Patient Position: Chair, Cuff Size: Adult Regular)   Pulse 144   Temp 99.4  F (37.4  C) (Tympanic)   Wt 89.7 kg (197 lb 12.8 oz)   SpO2 95%   BMI 26.83 kg/m   Estimated body mass index is 26.83 kg/m  as calculated from the following:    Height as of 12/7/18: 1.829 m (6').    Weight as of this encounter: 89.7 kg (197 lb 12.8 oz).    Patient presents to the clinic using No DME    Health Maintenance that is potentially due pending provider review:  NONE    n/a    Is there anyone who you would like to be able to receive your results? Not Applicable  If yes have patient fill out MARIBEL    Nicole Bacon M.A.

## 2019-02-12 NOTE — LETTER
February 12, 2019      Ashish Blas  [id number] [facility] [id number]        To Whom It May Concern:      sAhish Blas was seen in our clinic. Kindly excuse his work absence for today and tomorrow.         Sincerely,          Sandro Finnegan MD

## 2019-02-13 LAB
BACTERIA SPEC CULT: NORMAL
SPECIMEN SOURCE: NORMAL

## 2019-02-13 NOTE — PATIENT INSTRUCTIONS

## 2019-02-14 ENCOUNTER — OFFICE VISIT (OUTPATIENT)
Dept: URGENT CARE | Facility: URGENT CARE | Age: 39
End: 2019-02-14
Payer: COMMERCIAL

## 2019-02-14 VITALS
RESPIRATION RATE: 16 BRPM | TEMPERATURE: 98.1 F | SYSTOLIC BLOOD PRESSURE: 134 MMHG | WEIGHT: 199.6 LBS | DIASTOLIC BLOOD PRESSURE: 82 MMHG | OXYGEN SATURATION: 98 % | HEART RATE: 62 BPM | BODY MASS INDEX: 27.07 KG/M2

## 2019-02-14 DIAGNOSIS — H65.92 OME (OTITIS MEDIA WITH EFFUSION), LEFT: Primary | ICD-10-CM

## 2019-02-14 DIAGNOSIS — R05.9 COUGH: ICD-10-CM

## 2019-02-14 PROCEDURE — 99213 OFFICE O/P EST LOW 20 MIN: CPT | Performed by: NURSE PRACTITIONER

## 2019-02-14 RX ORDER — BENZONATATE 200 MG/1
200 CAPSULE ORAL 3 TIMES DAILY PRN
Qty: 30 CAPSULE | Refills: 0 | Status: SHIPPED | OUTPATIENT
Start: 2019-02-14 | End: 2020-10-27

## 2019-02-14 NOTE — LETTER
February 14, 2019      Ashish Blas  [address] [facility] [address]        To Whom It May Concern:    Ashish Blas was seen in our clinic. Please excuse from work today and tomorrow due to illness.    Sincerely,        MARTHA Santiago CNP

## 2019-02-14 NOTE — PROGRESS NOTES
SUBJECTIVE:   Ashish Blas is a 38 year old male who presents to clinic today for the following health issues:  Chief Complaint   Patient presents with     Cough     5-6 days, cough is productive, runny nose, congestion, sinus pressure, headache, some facial pain, possible wheezing, some left ear pain                  Problem list and histories reviewed & adjusted, as indicated.  Additional history: as documented    Patient Active Problem List   Diagnosis     Esophageal reflux     Anxiety     Mixed hyperlipidemia     Vitamin D deficiency     Mild major depression (H)     Past Surgical History:   Procedure Laterality Date     ABDOMEN SURGERY  2009    lap nissen     APPENDECTOMY  10/15/2009    age 30 approx     COLONOSCOPY  2014    Procedure: COLONOSCOPY;  Colonoscopy  ;  Surgeon: Abdon Pagan MD;  Location: [facility]     COLONOSCOPY N/A 2016    Procedure: COLONOSCOPY;  Surgeon: Christian Malagon MD;  Location: [facility]     ENDOSCOPY      many since he was 18     procedure for acid reflux  08/15/2009       Social History     Tobacco Use     Smoking status: Former Smoker     Years: 10.00     Types: Cigarettes     Start date: 2016     Last attempt to quit: 2017     Years since quittin.1     Smokeless tobacco: Never Used     Tobacco comment: Quit date is 2016   Substance Use Topics     Alcohol use: Yes     Comment: occ     Family History   Problem Relation Age of Onset     Hypertension Mother      Diabetes Mother      Lipids Mother      Hyperlipidemia Mother      Lipids Father      Cancer Father         Non Hodgkins Lymphoma     Hyperlipidemia Father      Diabetes Maternal Grandmother      Hypertension Maternal Grandmother      Cerebrovascular Disease Maternal Grandmother         70     Cancer Maternal Grandmother         rectal cancer     Heart Disease Maternal Grandfather         later 50s     Breast Cancer Paternal Grandmother      Lipids Brother      Cancer Sister          skin cancer     Cancer - colorectal Brother 37     Hypothyroidism Brother      Thyroid Disease Brother          Current Outpatient Medications   Medication Sig Dispense Refill     ALPRAZolam (XANAX) 0.25 MG tablet Take 1 tablet (0.25 mg) by mouth 3 times daily as needed for anxiety 20 tablet 0     amoxicillin-clavulanate (AUGMENTIN) 875-125 MG tablet Take 1 tablet by mouth 2 times daily for 10 days 20 tablet 0     benzonatate (TESSALON) 200 MG capsule Take 1 capsule (200 mg) by mouth 3 times daily as needed for cough 30 capsule 0     citalopram (CELEXA) 20 MG tablet Take 1 tablet (20 mg) by mouth daily 90 tablet 1     fish oil-omega-3 fatty acids 1000 MG capsule        sertraline (ZOLOFT) 100 MG tablet TAKE ONE TABLET BY MOUTH ONE TIME DAILY       VITAMIN B COMPLEX OR 1 TABLET ORALLY DAILY       No Known Allergies  Labs reviewed in EPIC    Reviewed and updated as needed this visit by clinical staff  Tobacco  Allergies  Meds  Problems  Med Hx  Surg Hx  Fam Hx  Soc Hx        Reviewed and updated as needed this visit by Provider  Tobacco  Allergies  Meds  Problems  Med Hx  Surg Hx  Fam Hx         ROS:  Constitutional, HEENT, cardiovascular, pulmonary, GI, , musculoskeletal, neuro, skin, endocrine and psych systems are negative, except as otherwise noted.    OBJECTIVE:     /82   Pulse 62   Temp 98.1  F (36.7  C) (Tympanic)   Resp 16   Wt 90.5 kg (199 lb 9.6 oz)   SpO2 98%   BMI 27.07 kg/m    Body mass index is 27.07 kg/m .   GENERAL: healthy, alert and no distress,nontoxic in appearance  EYES: Eyes grossly normal to inspection, PERRL and conjunctivae and sclerae normal  HENT: ear canals and TM's intact bilaterally with left red and right normal, nose and mouth without ulcers or lesions  NECK: no adenopathy, supple with full ROM  RESP: lungs clear to auscultation - no rales, rhonchi or wheezes  CV: regular rate and rhythm, normal S1 S2, no S3 or S4, no murmur, click or rub, no peripheral edema    ABDOMEN: soft, nontender, no hepatosplenomegaly, no masses and bowel sounds normal  MS: no gross musculoskeletal defects noted, no edema  No rash    Diagnostic Test Results:  No results found for this or any previous visit (from the past 24 hour(s)).    ASSESSMENT/PLAN:   He will  his Rx's ini morning when our pharmacy opens.  Problem List Items Addressed This Visit     None      Visit Diagnoses     OME (otitis media with effusion), left    -  Primary    Relevant Medications    amoxicillin-clavulanate (AUGMENTIN) 875-125 MG tablet    Cough        Relevant Medications    benzonatate (TESSALON) 200 MG capsule               Patient Instructions   Increase rest and fluids. Tylenol and/or Ibuprofen for comfort. Cool mist vaporizer. If your symptoms worsen or do not resolve follow up with your primary care provider in 1 week and sooner if needed.      Mucinex 600 mg 12 hour formula for ear, head and chest congestion.  It can also thin post nasal drip which can cause a cough and sore throat.    Indications for emergent return to emergency department discussed with patient, who verbalized good understanding and agreement.  Patient understands the limitations of today's evaluation.           Patient Education     Middle Ear Infection (Adult)  You have an infection of the middle ear, the space behind the eardrum. This is also called acute otitis media (AOM). Sometimes it is caused by the common cold. This is because congestion can block the internal passage (eustachian tube) that drains fluid from the middle ear. When the middle ear fills with fluid, bacteria can grow there and cause an infection. Oral antibiotics are used to treat this illness, not ear drops. Symptoms usually start to improve within 1 to 2 days of treatment.    Home care  The following are general care guidelines:    Finish all of the antibiotic medicine given, even though you may feel better after the first few days.    You may use over-the-counter  medicine, such as acetaminophen or ibuprofen, to control pain and fever, unless something else was prescribed. If you have chronic liver or kidney disease or have ever had a stomach ulcer or gastrointestinal bleeding, talk with your healthcare provider before using these medicines. Do not give aspirin to anyone under 18 years of age who has a fever. It may cause severe illness or death.  Follow-up care  Follow up with your healthcare provider, or as advised, in 2 weeks if all symptoms have not gotten better, or if hearing doesn't go back to normal within 1 month.  When to seek medical advice  Call your healthcare provider right away if any of these occur:    Ear pain gets worse or does not improve after 3 days of treatment    Unusual drowsiness or confusion    Neck pain, stiff neck, or headache    Fluid or blood draining from the ear canal    Fever of 100.4 F (38 C) or as advised     Seizure  Date Last Reviewed: 6/1/2016 2000-2018 [employer]. [address] [place] [address], [place] [address]. All rights reserved. This information is not intended as a substitute for professional medical care. Always follow your healthcare professional's instructions.               MARTHA Santiago [facility]

## 2019-02-15 NOTE — PATIENT INSTRUCTIONS
Increase rest and fluids. Tylenol and/or Ibuprofen for comfort. Cool mist vaporizer. If your symptoms worsen or do not resolve follow up with your primary care provider in 1 week and sooner if needed.      Mucinex 600 mg 12 hour formula for ear, head and chest congestion.  It can also thin post nasal drip which can cause a cough and sore throat.    Indications for emergent return to emergency department discussed with patient, who verbalized good understanding and agreement.  Patient understands the limitations of today's evaluation.           Patient Education     Middle Ear Infection (Adult)  You have an infection of the middle ear, the space behind the eardrum. This is also called acute otitis media (AOM). Sometimes it is caused by the common cold. This is because congestion can block the internal passage (eustachian tube) that drains fluid from the middle ear. When the middle ear fills with fluid, bacteria can grow there and cause an infection. Oral antibiotics are used to treat this illness, not ear drops. Symptoms usually start to improve within 1 to 2 days of treatment.    Home care  The following are general care guidelines:    Finish all of the antibiotic medicine given, even though you may feel better after the first few days.    You may use over-the-counter medicine, such as acetaminophen or ibuprofen, to control pain and fever, unless something else was prescribed. If you have chronic liver or kidney disease or have ever had a stomach ulcer or gastrointestinal bleeding, talk with your healthcare provider before using these medicines. Do not give aspirin to anyone under 18 years of age who has a fever. It may cause severe illness or death.  Follow-up care  Follow up with your healthcare provider, or as advised, in 2 weeks if all symptoms have not gotten better, or if hearing doesn't go back to normal within 1 month.  When to seek medical advice  Call your healthcare provider right away if any of these  occur:    Ear pain gets worse or does not improve after 3 days of treatment    Unusual drowsiness or confusion    Neck pain, stiff neck, or headache    Fluid or blood draining from the ear canal    Fever of 100.4 F (38 C) or as advised     Seizure  Date Last Reviewed: 6/1/2016 2000-2018 [employer]. [address] [place], [place] [id number]. All rights reserved. This information is not intended as a substitute for professional medical care. Always follow your healthcare professional's instructions.

## 2019-06-28 ENCOUNTER — TELEPHONE (OUTPATIENT)
Dept: FAMILY MEDICINE | Facility: CLINIC | Age: 39
End: 2019-06-28

## 2019-06-28 NOTE — TELEPHONE ENCOUNTER
Panel Management Review      Patient has the following on his problem list:     Depression / Dysthymia review    Measure:  Needs PHQ-9 score of 4 or less during index window.  Administer PHQ-9 and if score is 5 or more, send encounter to provider for next steps.    5 - 7 month window range: Due    PHQ-9 SCORE 5/27/2016 3/31/2017 12/7/2018   PHQ-9 Total Score - - -   PHQ-9 Total Score 6 3 15       If PHQ-9 recheck is 5 or more, route to provider for next steps.    Patient is due for:  PHQ9      Composite cancer screening  Chart review shows that this patient is due/due soon for the following Colonoscopy  Summary:    Patient is due/failing the following:   PHQ9    Action needed:   Patient needs to do PHQ9.    Type of outreach:    Phone, left message for patient to call back.  and Sent [employer] message.    Questions for provider review:    None                                                                                                                                    Haydee Irwin MA       Chart routed to Care Team .

## 2019-11-06 ENCOUNTER — HEALTH MAINTENANCE LETTER (OUTPATIENT)
Age: 39
End: 2019-11-06

## 2020-10-27 ENCOUNTER — VIRTUAL VISIT (OUTPATIENT)
Dept: FAMILY MEDICINE | Facility: CLINIC | Age: 40
End: 2020-10-27
Payer: COMMERCIAL

## 2020-10-27 DIAGNOSIS — F41.9 ANXIETY: ICD-10-CM

## 2020-10-27 PROCEDURE — 99213 OFFICE O/P EST LOW 20 MIN: CPT | Mod: GT | Performed by: NURSE PRACTITIONER

## 2020-10-27 RX ORDER — ALPRAZOLAM 0.25 MG
0.25 TABLET ORAL 3 TIMES DAILY PRN
Qty: 30 TABLET | Refills: 0 | Status: SHIPPED | OUTPATIENT
Start: 2020-10-27 | End: 2020-12-01

## 2020-10-27 ASSESSMENT — ANXIETY QUESTIONNAIRES
7. FEELING AFRAID AS IF SOMETHING AWFUL MIGHT HAPPEN: NOT AT ALL
GAD7 TOTAL SCORE: 8
5. BEING SO RESTLESS THAT IT IS HARD TO SIT STILL: NOT AT ALL
3. WORRYING TOO MUCH ABOUT DIFFERENT THINGS: SEVERAL DAYS
2. NOT BEING ABLE TO STOP OR CONTROL WORRYING: SEVERAL DAYS
1. FEELING NERVOUS, ANXIOUS, OR ON EDGE: NEARLY EVERY DAY
6. BECOMING EASILY ANNOYED OR IRRITABLE: MORE THAN HALF THE DAYS

## 2020-10-27 ASSESSMENT — PATIENT HEALTH QUESTIONNAIRE - PHQ9
SUM OF ALL RESPONSES TO PHQ QUESTIONS 1-9: 6
5. POOR APPETITE OR OVEREATING: SEVERAL DAYS

## 2020-10-27 NOTE — PROGRESS NOTES
"Ashish Blas is a 39 year old male who is being evaluated via a billable video visit.      The patient has been notified of following:     \"This video visit will be conducted via a call between you and your physician/provider. We have found that certain health care needs can be provided without the need for an in-person physical exam.  This service lets us provide the care you need with a video conversation.  If a prescription is necessary we can send it directly to your pharmacy.  If lab work is needed we can place an order for that and you can then stop by our lab to have the test done at a later time.    Video visits are billed at different rates depending on your insurance coverage.  Please reach out to your insurance provider with any questions.    If during the course of the call the physician/provider feels a video visit is not appropriate, you will not be charged for this service.\"    Patient has given verbal consent for Video visit? Yes  How would you like to obtain your AVS? MyChart  If you are dropped from the video visit, the video invite should be resent to: Text to cell phone: . [phone]  Will anyone else be joining your video visit? No    Subjective     Ashish Blas is a 39 year old male who presents today via video visit for the following health issues:    HPI     Depression and Anxiety Follow-Up    How are you doing with your depression since your last visit? Worsened for 3 weeks    How are you doing with your anxiety since your last visit?  Worsened for 3 weeks    Are you having other symptoms that might be associated with depression or anxiety? No    Have you had a significant life event? No     Do you have any concerns with your use of alcohol or other drugs? No    Social History     Tobacco Use     Smoking status: Former Smoker     Years: 10.00     Types: Cigarettes     Start date: 7/19/2016     Quit date: 1/2/2017     Years since quitting: 3.8     Smokeless tobacco: Never Used     Tobacco " comment: Quit date is 5/30/2016   Substance Use Topics     Alcohol use: Yes     Comment: occ     Drug use: No     PHQ 3/31/2017 12/7/2018 10/27/2020   PHQ-9 Total Score 3 15 6   Q9: Thoughts of better off dead/self-harm past 2 weeks Not at all Not at all Not at all     CELSO-7 SCORE 3/31/2017 12/7/2018 10/27/2020   Total Score - - -   Total Score 5 18 8     Last PHQ-9 10/27/2020   1.  Little interest or pleasure in doing things 0   2.  Feeling down, depressed, or hopeless 1   3.  Trouble falling or staying asleep, or sleeping too much 3   4.  Feeling tired or having little energy 0   5.  Poor appetite or overeating 0   6.  Feeling bad about yourself 0   7.  Trouble concentrating 1   8.  Moving slowly or restless 1   Q9: Thoughts of better off dead/self-harm past 2 weeks 0   PHQ-9 Total Score 6   Difficulty at work, home, or with people -     CELSO-7  10/27/2020   1. Feeling nervous, anxious, or on edge 3   2. Not being able to stop or control worrying 1   3. Worrying too much about different things 1   4. Trouble relaxing 1   5. Being so restless that it is hard to sit still 0   6. Becoming easily annoyed or irritable 2   7. Feeling afraid, as if something awful might happen 0   CELSO-7 Total Score 8   If you checked any problems, how difficult have they made it for you to do your work, take care of things at home, or get along with other people? -       Suicide Assessment Five-step Evaluation and Treatment (SAFE-T)      How many servings of fruits and vegetables do you eat daily?  2-3    On average, how many sweetened beverages do you drink each day (Examples: soda, juice, sweet tea, etc.  Do NOT count diet or artificially sweetened beverages)?   0    How many days per week do you exercise enough to make your heart beat faster? 3 or less    How many minutes a day do you exercise enough to make your heart beat faster? 30 - 60  How many days per week do you miss taking your medication? N/A    What makes it hard for you to  take your medications?  N/A  Patient has not been on medication for a long time. Patient states he always gets worse around this time a year. Patient is not taking vitamin D supplement. Patient states the Zoloft and Celexa in the past caused GI sx. Patient is requesting Xanax.     Video Start Time: 2:13 PM      Review of Systems   Constitutional, HEENT, cardiovascular, pulmonary, gi and gu systems are negative, except as otherwise noted.      Objective           Vitals:  No vitals were obtained today due to virtual visit.    Physical Exam     GENERAL: Healthy, alert and no distress  EYES: Eyes grossly normal to inspection.  No discharge or erythema, or obvious scleral/conjunctival abnormalities.  RESP: No audible wheeze, cough, or visible cyanosis.  No visible retractions or increased work of breathing.    SKIN: Visible skin clear. No significant rash, abnormal pigmentation or lesions.  NEURO: Cranial nerves grossly intact.  Mentation and speech appropriate for age.  PSYCH: Mentation appears normal, affect normal/bright, judgement and insight intact, normal speech and appearance well-groomed.      No results found for any visits on 10/27/20.        Assessment & Plan     Anxiety   Controlled no change in treatment plan  - ALPRAZolam (XANAX) 0.25 MG tablet; Take 1 tablet (0.25 mg) by mouth 3 times daily as needed for anxiety          No follow-ups on file.    MARTHA Almaraz CNP  [facility]      Video-Visit Details    Type of service:  Video Visit    Video End Time:5:56 PM    Originating Location (pt. Location): Home    Distant Location (provider location):  [facility]     Platform used for Video Visit: Doe

## 2020-10-28 ASSESSMENT — ANXIETY QUESTIONNAIRES: GAD7 TOTAL SCORE: 8

## 2020-11-02 ENCOUNTER — VIRTUAL VISIT (OUTPATIENT)
Dept: FAMILY MEDICINE | Facility: OTHER | Age: 40
End: 2020-11-02

## 2020-11-02 DIAGNOSIS — Z20.822 SUSPECTED 2019 NOVEL CORONAVIRUS INFECTION: Primary | ICD-10-CM

## 2020-11-29 ENCOUNTER — TELEPHONE (OUTPATIENT)
Dept: FAMILY MEDICINE | Facility: CLINIC | Age: 40
End: 2020-11-29

## 2020-11-29 ENCOUNTER — HEALTH MAINTENANCE LETTER (OUTPATIENT)
Age: 40
End: 2020-11-29

## 2020-11-29 DIAGNOSIS — F41.9 ANXIETY: ICD-10-CM

## 2020-11-30 RX ORDER — ALPRAZOLAM 0.25 MG
0.25 TABLET ORAL 3 TIMES DAILY PRN
Qty: 30 TABLET | Refills: 0 | OUTPATIENT
Start: 2020-11-30

## 2020-11-30 NOTE — PROGRESS NOTES
"  SUBJECTIVE:   Ashish Blas is a 37 year old male who presents to clinic today for the following health issues:      Pt is here for lips being dry since Feb. He has seen a Dermatologist and gave his cortisone to use but if he stops they get dry and crack again.        Problem list and histories reviewed & adjusted, as indicated.  Additional history: he has seen dermatology for this in the past and sounds like he had angular cheilitis, that has resolved. He presently has no cracks, rashes, lesions but continues to experience dry lips. He has been applying balms a lot. He is curious about that.  He has also experienced some palpitations, which he states happen when he's exercising. He can \"work through them\" and feels it's likely related to anxiety which he struggles with. No chest pain, shortness of breath or lightheadedness.  He has lost a lot of weight but he's been trying. He's doing Paleo diet.  No other concerns voiced today.  He works in IT.      Patient Active Problem List   Diagnosis     Esophageal reflux     Anxiety     Mixed hyperlipidemia     Past Surgical History:   Procedure Laterality Date     ABDOMEN SURGERY  08/01/2009    lap nissen     APPENDECTOMY  10/15/2009    age 30 approx     COLONOSCOPY  2/17/2014    Procedure: COLONOSCOPY;  Colonoscopy  ;  Surgeon: Abdon Pagan MD;  Location: [facility]     COLONOSCOPY N/A 5/11/2016    Procedure: COLONOSCOPY;  Surgeon: Christian Malagon MD;  Location: [facility]     ENDOSCOPY      many since he was 18     procedure for acid reflux  08/15/2009       Social History   Substance Use Topics     Smoking status: Former Smoker     Years: 10.00     Types: Cigarettes     Start date: 7/19/2016     Quit date: 1/2/2017     Smokeless tobacco: Never Used      Comment: Quit date is 5/30/2016     Alcohol use Yes      Comment: occ     Family History   Problem Relation Age of Onset     Hypertension Mother      DIABETES Mother      Lipids Mother      Hyperlipidemia Mother  "     Lipids Father      CANCER Father      Non Hodgkins Lymphoma     Hyperlipidemia Father      DIABETES Maternal Grandmother      Hypertension Maternal Grandmother      CEREBROVASCULAR DISEASE Maternal Grandmother      70     CANCER Maternal Grandmother      rectal cancer     HEART DISEASE Maternal Grandfather      later 50s     Breast Cancer Paternal Grandmother      Lipids Brother      CANCER Sister      skin cancer     Cancer - colorectal Brother 37     Hypothyroidism Brother      Thyroid Disease Brother          Current Outpatient Prescriptions   Medication Sig Dispense Refill     Acetaminophen (TYLENOL PO)        ALPRAZolam (XANAX) 0.25 MG tablet Take 1 tablet (0.25 mg) by mouth 3 times daily as needed for anxiety 20 tablet 0     MULTI-VITAMIN OR TABS 1 TABLET ORALLY DAILY       sertraline (ZOLOFT) 100 MG tablet TAKE ONE TABLET BY MOUTH ONE TIME DAILY       VITAMIN B COMPLEX OR 1 TABLET ORALLY DAILY       fluticasone (FLONASE) 50 MCG/ACT spray Spray 1 spray into both nostrils 2 times daily (Patient not taking: Reported on 5/4/2018) 1 Bottle 11     No Known Allergies  Recent Labs   Lab Test  02/15/18   1341  03/31/17   0823  01/20/14   0906   LDL   --   227*  202*   HDL   --   39*  35*   TRIG   --   93  158*   ALT  43  64   --    CR  0.63*  0.74   --    GFRESTIMATED  >90  >90  Non African American GFR Calc     --    GFRESTBLACK  >90  >90  African American GFR Calc     --    POTASSIUM  4.0  3.9   --    TSH   --   0.99   --       BP Readings from Last 3 Encounters:   05/04/18 130/80   03/25/18 147/78   02/15/18 131/81    Wt Readings from Last 3 Encounters:   05/04/18 172 lb (78 kg)   03/25/18 184 lb 9.6 oz (83.7 kg)   02/15/18 174 lb 9.6 oz (79.2 kg)                    Reviewed and updated as needed this visit by clinical staff  Tobacco  Allergies  Meds  Med Hx  Surg Hx  Fam Hx  Soc Hx      Reviewed and updated as needed this visit by Provider          ROS: 10 point ROS neg other than the symptoms noted  above in the HPI.    OBJECTIVE:     /80  Pulse 71  Temp 98.9  F (37.2  C) (Oral)  Resp 16  Ht 6' (1.829 m)  Wt 172 lb (78 kg)  BMI 23.33 kg/m2  Body mass index is 23.33 kg/(m^2).  GENERAL: healthy, alert and no distress  HENT: ear canals and TM's normal, pharynx without erythema  NECK: no adenopathy, no asymmetry  RESP: lungs clear to auscultation - no rales, rhonchi or wheezes  CV: regular rate and rhythm, normal S1 S2, no S3 or S4, no murmur  ABDOMEN: soft, nontender, no hepatosplenomegaly, no masses and bowel sounds normal  MS: no gross musculoskeletal defects noted  SKIN: lips appear normal, no rashes, no cracks, no lesions, oral and tongue without concerning findings     Diagnostic Test Results:  No results found for this or any previous visit (from the past 24 hour(s)).    ASSESSMENT/PLAN:             1. Dry lips    No signs of anything concerning with lips.  Try avoiding any topical lip treatments.    2. Palpitations    - TSH with free T4 reflex; Future  He states it's not that bad, offered him EKG and Holter but declining for now.    3. Mixed hyperlipidemia    - Lipid panel reflex to direct LDL Fasting; Future    See Patient Instructions  Patient Instructions   Try not to use any topicals on your lips and see what happens.  Return for fasting labs and will be notified of those results.  If palpitations worsen, would recommend EKG and Holter monitor.  Follow up if symptoms persist or worsen and as needed.        Thank you for choosing [facility].  You may be receiving a survey in the mail from [employer] regarding your visit today.  Please take a few minutes to complete and return the survey to let us know how we are doing.      Our Clinic hours are:  Mondays    7:20 am - 7 pm  Tues -  Fri  7:20 am - 5 pm    Clinic Phone: [phone]    The clinic lab opens at 7:30 am Mon - Fri and appointments are required.    [facility]. [phone]  Monday-Thursday 8 am -  7pm  Tues/Wed/Fri 8 am - 5:30 pm             MARTHA Rayo [facility]     Other, specify...

## 2020-11-30 NOTE — TELEPHONE ENCOUNTER
Requested Prescriptions   Pending Prescriptions Disp Refills     ALPRAZolam (XANAX) 0.25 MG tablet [Pharmacy Med Name: ALPRAZOLAM 0.25 MG TABLET] 30 tablet 0     Sig: TAKE 1 TABLET (0.25 MG) BY MOUTH 3 TIMES DAILY AS NEEDED FOR ANXIETY       There is no refill protocol information for this order        Last Written Prescription Date:  10/27/20  Last Fill Quantity: 30,  # refills: 0   Last office visit: Visit date not found with prescribing provider:     Future Office Visit:             good, to achieve stated therapy goals

## 2020-12-01 RX ORDER — ALPRAZOLAM 0.25 MG
0.25 TABLET ORAL 3 TIMES DAILY PRN
Qty: 30 TABLET | Refills: 0 | Status: SHIPPED | OUTPATIENT
Start: 2020-12-01 | End: 2022-10-19

## 2020-12-01 NOTE — TELEPHONE ENCOUNTER
Pt called and stated that he had a virtual appt on 10/27/2020 and was told he would have another 30 days for this medication.    Poornima Day  [facility] [place]

## 2021-08-20 ENCOUNTER — MYC MEDICAL ADVICE (OUTPATIENT)
Dept: FAMILY MEDICINE | Facility: CLINIC | Age: 41
End: 2021-08-20

## 2021-08-20 DIAGNOSIS — Z20.822 ENCOUNTER FOR LABORATORY TESTING FOR COVID-19 VIRUS: Primary | ICD-10-CM

## 2021-09-17 ENCOUNTER — LAB (OUTPATIENT)
Dept: LAB | Facility: OTHER | Age: 41
End: 2021-09-17
Attending: NURSE PRACTITIONER
Payer: COMMERCIAL

## 2021-09-17 DIAGNOSIS — Z20.822 ENCOUNTER FOR LABORATORY TESTING FOR COVID-19 VIRUS: ICD-10-CM

## 2021-09-17 PROCEDURE — 99000 SPECIMEN HANDLING OFFICE-LAB: CPT

## 2021-09-17 PROCEDURE — 36415 COLL VENOUS BLD VENIPUNCTURE: CPT

## 2021-09-17 PROCEDURE — 86769 SARS-COV-2 COVID-19 ANTIBODY: CPT | Mod: 90

## 2021-09-19 ENCOUNTER — HEALTH MAINTENANCE LETTER (OUTPATIENT)
Age: 41
End: 2021-09-19

## 2021-09-20 LAB
SARS-COV-2 AB SERPL IA-ACNC: <0.4 U/ML
SARS-COV-2 AB SERPL QL IA: NEGATIVE

## 2021-10-19 PROBLEM — F32.9 MAJOR DEPRESSION: Status: ACTIVE | Noted: 2018-12-10

## 2022-01-09 ENCOUNTER — HEALTH MAINTENANCE LETTER (OUTPATIENT)
Age: 42
End: 2022-01-09

## 2022-10-19 ENCOUNTER — VIRTUAL VISIT (OUTPATIENT)
Dept: INTERNAL MEDICINE | Facility: CLINIC | Age: 42
End: 2022-10-19
Payer: COMMERCIAL

## 2022-10-19 DIAGNOSIS — R07.0 THROAT PAIN: Primary | ICD-10-CM

## 2022-10-19 DIAGNOSIS — R05.9 COUGH, UNSPECIFIED TYPE: ICD-10-CM

## 2022-10-19 DIAGNOSIS — J32.9 SINUSITIS, UNSPECIFIED CHRONICITY, UNSPECIFIED LOCATION: ICD-10-CM

## 2022-10-19 DIAGNOSIS — H10.33 ACUTE BACTERIAL CONJUNCTIVITIS OF BOTH EYES: ICD-10-CM

## 2022-10-19 PROCEDURE — 99213 OFFICE O/P EST LOW 20 MIN: CPT | Mod: GT | Performed by: NURSE PRACTITIONER

## 2022-10-19 RX ORDER — POLYMYXIN B SULFATE AND TRIMETHOPRIM 1; 10000 MG/ML; [USP'U]/ML
1-2 SOLUTION OPHTHALMIC EVERY 4 HOURS
Qty: 10 ML | Refills: 0 | Status: SHIPPED | OUTPATIENT
Start: 2022-10-19 | End: 2023-01-24

## 2022-10-19 RX ORDER — CEFDINIR 300 MG/1
300 CAPSULE ORAL 2 TIMES DAILY
Qty: 20 CAPSULE | Refills: 0 | Status: SHIPPED | OUTPATIENT
Start: 2022-10-19 | End: 2023-01-24

## 2022-10-19 ASSESSMENT — PATIENT HEALTH QUESTIONNAIRE - PHQ9
SUM OF ALL RESPONSES TO PHQ QUESTIONS 1-9: 0
SUM OF ALL RESPONSES TO PHQ QUESTIONS 1-9: 0
10. IF YOU CHECKED OFF ANY PROBLEMS, HOW DIFFICULT HAVE THESE PROBLEMS MADE IT FOR YOU TO DO YOUR WORK, TAKE CARE OF THINGS AT HOME, OR GET ALONG WITH OTHER PEOPLE: NOT DIFFICULT AT ALL

## 2022-10-19 NOTE — PROGRESS NOTES
Ashish is a 41 year old who is being evaluated via a billable video visit.      How would you like to obtain your AVS? Kamila  If the video visit is dropped, the invitation should be resent by: kamila  Will anyone else be joining your video visit? No        Assessment & Plan     Throat pain  Neg covid   No white spots   Declined strep test   - cefdinir (OMNICEF) 300 MG capsule; Take 1 capsule (300 mg) by mouth 2 times daily    Cough, unspecified type    - cefdinir (OMNICEF) 300 MG capsule; Take 1 capsule (300 mg) by mouth 2 times daily    Sinusitis, unspecified chronicity, unspecified location    - cefdinir (OMNICEF) 300 MG capsule; Take 1 capsule (300 mg) by mouth 2 times daily    Acute bacterial conjunctivitis of both eyes    - trimethoprim-polymyxin b (POLYTRIM) 35092-5.1 UNIT/ML-% ophthalmic solution; Place 1-2 drops into both eyes every 4 hours      15 minutes spent on the date of the encounter doing chart review, history and exam, documentation and further activities per the note       There are no Patient Instructions on file for this visit.    No follow-ups on file.    MARTHA Bello CNP  [facility]    Nohemi Hinojosa is a 41 year old, presenting for the following health issues:  Chief Complaint   Patient presents with     Fever     Conjunctivitis     Cough       History of Present Illness       Reason for visit:  Fever, pink eye, coughing  Symptom onset:  3-7 days ago  Symptoms include:  Fever, pink eye, coughing  Symptom intensity:  Moderate  Symptom progression:  Worsening  Had these symptoms before:  No    He eats 0-1 servings of fruits and vegetables daily.He consumes 0 sweetened beverage(s) daily.He exercises with enough effort to increase his heart rate 10 to 19 minutes per day.  He exercises with enough effort to increase his heart rate 3 or less days per week.   He is taking medications regularly.    Today's PHQ-9         PHQ-9 Total Score: 0    PHQ-9 Q9 Thoughts  of better off dead/self-harm past 2 weeks :   Not at all    How difficult have these problems made it for you to do your work, take care of things at home, or get along with other people: Not difficult at all       covid test negative x 2 last week and now   Fever 101.8- no fever for 3 days - cough productive green yellow - same from nose   Pain around eyes - history sinus infection  -sore throat - swallowing glass feeling- no white spots in throat   Congestion in ears and head    Matted eyes and redness in eyes   Review of Systems   Constitutional, HEENT, cardiovascular, pulmonary, GI, , musculoskeletal, neuro, skin, endocrine and psych systems are negative, except as otherwise noted.      Objective           Vitals:  No vitals were obtained today due to virtual visit.    Physical Exam   GENERAL: alert and throat pain   EYES: Eyes grossly normal to inspection.  Mattered eye lid and injected conjunctiva bilaterally    RESP: No audible wheeze, occasional cough, no visible cyanosis.  No visible retractions or increased work of breathing.    SKIN: Visible skin clear. No significant rash, abnormal pigmentation or lesions.  NEURO: Cranial nerves grossly intact.  Mentation and speech appropriate for age.  PSYCH: Mentation appears normal, affect normal/bright, judgement and insight intact, normal speech and appearance well-groomed.                Video-Visit Details    Video Start Time: 1110    Type of service:  Video Visit    Video End Time:11:26 AM    Originating Location (pt. Location): Home    Distant Location (provider location):  On-site    Platform used for Video Visit: Luana

## 2022-10-19 NOTE — NURSING NOTE
Chief Complaint   Patient presents with     Fever     Conjunctivitis     Cough     initial There were no vitals taken for this visit. Estimated body mass index is 27.07 kg/m  as calculated from the following:    Height as of 12/7/18: 1.829 m (6').    Weight as of 2/14/19: 90.5 kg (199 lb 9.6 oz)..  bp completed using cuff size NA (Not Taken)  JOURDAN MEJIA LPN

## 2022-11-21 ENCOUNTER — HEALTH MAINTENANCE LETTER (OUTPATIENT)
Age: 42
End: 2022-11-21

## 2023-01-24 ENCOUNTER — VIRTUAL VISIT (OUTPATIENT)
Dept: FAMILY MEDICINE | Facility: CLINIC | Age: 43
End: 2023-01-24
Payer: COMMERCIAL

## 2023-01-24 DIAGNOSIS — Z80.0 FAMILY HISTORY OF COLON CANCER: Primary | ICD-10-CM

## 2023-01-24 DIAGNOSIS — K21.00 GASTROESOPHAGEAL REFLUX DISEASE WITH ESOPHAGITIS WITHOUT HEMORRHAGE: ICD-10-CM

## 2023-01-24 PROCEDURE — 99213 OFFICE O/P EST LOW 20 MIN: CPT | Mod: GT | Performed by: NURSE PRACTITIONER

## 2023-01-24 RX ORDER — FAMOTIDINE 20 MG/1
20 TABLET, FILM COATED ORAL 2 TIMES DAILY PRN
Qty: 60 TABLET | Refills: 1 | Status: SHIPPED | OUTPATIENT
Start: 2023-01-24 | End: 2023-12-08

## 2023-01-24 NOTE — PROGRESS NOTES
Ashish is a 42 year old who is being evaluated via a billable video visit.      How would you like to obtain your AVS? MyChart  If the video visit is dropped, the invitation should be resent by: Text to cell phone: [phone]  Will anyone else be joining your video visit? No        Assessment & Plan     (Z80.0) Family history of colon cancer  (primary encounter diagnosis)  Comment:   Plan: Colonoscopy Screening  Referral      (K21.00) Gastroesophageal reflux disease with esophagitis without hemorrhage  Comment: will obtain upper Gi and based on history will have him follow-up with Gastroenterology   Plan: Adult GI  Referral - Consult Only,         famotidine (PEPCID) 20 MG tablet, Adult GI          Referral - Procedure Only  See Patient Instructions    No follow-ups on file.    MARTHA Almaraz CNP  [facility]    Nohemi Hinojosa is a 42 year old, presenting for the following health issues:  No chief complaint on file.      HPI     GERD/Heartburn  Onset/Duration: a couple years  Description:   Intensity: moderate  Progression of Symptoms: worsening  Accompanying Signs & Symptoms:  Does it feel like food gets stuck or trouble swallowing: No  Nausea: No  Vomiting (bloody?): No  Abdominal Pain: No  Black-Tarry stools: No  Bloody stools: No  History:  Previous similar episodes: YES  Previous ulcers: No  Precipitating factors:   Caffeine use: YES- 4-5 cans of pop a day  Alcohol use: YES- occasional  NSAID/Aspirin use: No  Tobacco use: No  Worse with red sauce  Alleviating factors: None  Therapies tried and outcome:             Lifestyle changes: None            Medications: none          Review of Systems   Constitutional, HEENT, cardiovascular, pulmonary, gi and gu systems are negative, except as otherwise noted.      Objective           Vitals:  No vitals were obtained today due to virtual visit.    Physical Exam   GENERAL: Healthy, alert and no  distress  EYES: Eyes grossly normal to inspection.  No discharge or erythema, or obvious scleral/conjunctival abnormalities.  RESP: No audible wheeze, cough, or visible cyanosis.  No visible retractions or increased work of breathing.    SKIN: Visible skin clear. No significant rash, abnormal pigmentation or lesions.  NEURO: Cranial nerves grossly intact.  Mentation and speech appropriate for age.  PSYCH: Mentation appears normal, affect normal/bright, judgement and insight intact, normal speech and appearance well-groomed.    No results found for any visits on 01/24/23.            Video-Visit Details    Type of service:  Video Visit     Originating Location (pt. Location): Home    Distant Location (provider location):  On-site  Platform used for Video Visit: LuisYAMAP

## 2023-01-26 ENCOUNTER — TELEPHONE (OUTPATIENT)
Dept: GASTROENTEROLOGY | Facility: CLINIC | Age: 43
End: 2023-01-26
Payer: COMMERCIAL

## 2023-01-26 ENCOUNTER — HOSPITAL ENCOUNTER (OUTPATIENT)
Facility: CLINIC | Age: 43
End: 2023-01-26
Attending: INTERNAL MEDICINE | Admitting: INTERNAL MEDICINE
Payer: COMMERCIAL

## 2023-01-26 NOTE — TELEPHONE ENCOUNTER
Health Call Center    Phone Message    May a detailed message be left on voicemail: yes     Reason for Call: Appointment Intake    Referring Provider Name: MARTHA Bruce  Diagnosis and/or Symptoms: Gastroesophageal reflux disease with esophagitis without hemorrhage [K21.00]    Per triage notes, patient is to be seen as a GI Urgent, but is currently scheduled on 02/28/2023 with MARTHA Marquez. Current appointment is outside requested time frame. Please review for further follow up per scheduling guidelines. Thanks!     Action Taken: Message routed to:  [facility] GI    Travel Screening: Not Applicable

## 2023-01-26 NOTE — TELEPHONE ENCOUNTER
Called and talked with Pt. Pt is now rescheduled with Ayleen Day on 1/30/2023 at 10:45am for a video visit.

## 2023-01-26 NOTE — TELEPHONE ENCOUNTER
Screening Questions    BlueKIND OF PREP RedLOCATION [review exclusion criteria] GreenSEDATION TYPE    N Have you had a positive covid test in the last 2 weeks?   If yes, what date? Schedule out 14 days from symptoms  Y and your able to make your own medical decisions?  Y Are you active on mychart?   HP What insurance do you carry?    GINO SIMONS Ordering/Referring Provider:     27.0 BMI [BMI OVER 40-EXTENDED PREP]  BMI OVER 40 NEED PAC EVALUATION FOR [facility]    Respiratory Screening  [If yes to any of the following HOSPITAL setting only]     N      Do you use daily home oxygen?   N      Do you have mod to severe Obstructive Sleep Apnea?  N      Do you have Pulmonary Hypertension? NEED PAC APPT AT [facility]    N      Do you have UNCONTROLLED asthma?      N Have you had a heart or lung transplant?       N Are you currently on dialysis? [If yes, G-PREP & HOSPITAL setting only]   N  Do you have chronic kidney disease? [If yes, G-PREP]  N  Do you have a diagnosis of diabetes?[If yes, G-PREP]    N Have you had a stroke or Transient ischemic attack (TIA - aka  mini stroke ) within 6 months? (If yes, please review exclusion criteria)  N  In the past 6 months, have you had any heart related issues including cardiomyopathy or heart attack?   N  If yes, did it require cardiac stenting or other implantable device?       N Do you have any implantable devices in your body (pacemaker, defib, LVAD)? (If yes, please review exclusion criteria)    N Do you take nitroglycerin?   N If yes, how often?  (If yes, HOSPITAL setting ONLY)  N Are you currently taking any blood thinners?           [IF YES, INFORM PATIENT TO FOLLOW UP W/ ORDERING PROVIDER FOR BRIDGING INSTRUCTIONS]     N  Do you take Phentermine?   Yes-> Hold for 7 days before procedure.  Please consult your prescribing provider if you have questions about holding this  medication.         [FEMALES] are you currently pregnant?       If yes, how many weeks? [Greater than 12 weeks, OR NEEDED]    N Any prescription pain medications taken daily like narcotics on a routine schedule? [EXTENDED PREP AND MAC]  (ex narcotics: tramadol, oxycodone, roxicodone, oxyxontin,  and percocet)?    N  Any chemical dependencies such as alcohol, street drugs, or methadone? [If yes, MAC]    N Do you need help transferring? (If NO, please HOSPITAL setting  only)     N  On a regular basis do you go 3-5 days without a bowel movement?[ If yes, EXTENDED PREP.]     Preferred LOCAL Pharmacy for Pre Prescription:        [facility] [address] [place], [place] - [address]                        Scheduling Details    Ashish Caller:   (Please ask for phone number if not scheduled by patient)    Type of Procedure Scheduled: Upper and Lower Endoscopy [EGD and Colonoscopy]      STANDARD [facility]-If you answer yes to questions #8, #20, #21 Which Colonoscopy Prep was Sent:   [facility] PATIENTS & [facility] PATIENTS NEEDS EXTENDED PREP    3/16 Date of Procedure:   [facility] Surgeon:   ANTONETTE Location:     MAC Sedation Type:     Conscious Sedation- Needs  for 6 hours after the procedure  MAC/General-Needs  for 24 hours after procedure    Y Informed patient they will need an adult          Cannot take any type of public or medical transportation alone    Y Confirmed Nurse will call to complete assessment      Pre-Procedure Covid test to be completed [[facility] PCR Testing Required]    DOUBLE CHECK BMI AND RICHARD  NEEDS ADULT -CANNOT TAKE PUBILC OR MEDICAL TRANSPOTATION  COVID TEST FOR [facility] 3-4 DAYS  COIVD TEST FOR [facility] CAN BE HOME     Additional comments:

## 2023-01-30 ENCOUNTER — VIRTUAL VISIT (OUTPATIENT)
Dept: GASTROENTEROLOGY | Facility: CLINIC | Age: 43
End: 2023-01-30
Attending: NURSE PRACTITIONER
Payer: COMMERCIAL

## 2023-01-30 DIAGNOSIS — K21.00 GASTROESOPHAGEAL REFLUX DISEASE WITH ESOPHAGITIS WITHOUT HEMORRHAGE: ICD-10-CM

## 2023-01-30 DIAGNOSIS — Z98.890 HISTORY OF NISSEN FUNDOPLICATION: Primary | ICD-10-CM

## 2023-01-30 PROCEDURE — 99204 OFFICE O/P NEW MOD 45 MIN: CPT | Mod: GT | Performed by: NURSE PRACTITIONER

## 2023-01-30 RX ORDER — PANTOPRAZOLE SODIUM 40 MG/1
40 TABLET, DELAYED RELEASE ORAL DAILY
Qty: 30 TABLET | Refills: 3 | Status: SHIPPED | OUTPATIENT
Start: 2023-01-30 | End: 2023-12-08

## 2023-01-30 NOTE — PROGRESS NOTES
GASTROENTEROLOGY NEW VIDEO VISIT      How would you like to obtain your AVS? MyChart  If the video visit is dropped, the invitation should be resent by: Text to cell phone: [phone]  Will anyone else be joining your video visit? No  ..    Video-Visit Details    Type of service:  Video Visit    Video Start Time (time video started): 1024    Video End Time (time video stopped): 1038    Originating Location (pt. Location): Home        Distant Location (provider location):  On-site    Mode of Communication:  Video Conference via [employer]    Physician has received verbal consent for a Video Visit from the patient? Yes    Gastroenterology RETURN VIDEO VISIT    CC/REFERRING PROVIDER: Michelle Lyn  REASON FOR CONSULTATION: acid reflux    HPI: 42 year old male with PMH of hiatal hernia, status post-Nissen fundoplication in 2009, anxiety, appendectomy, presenting to GI clinic for complains of recurrence of acid reflux approximately 2 years ago. Complains of daily symptoms for the past 6 months. Admits to belching and bloating. Reported sour taste in the mouth and burning in the chest, worse after meals. Aggravated by tomato-based foods and caffeine (drinks approximately 5 cans of pop a day). Reported intermittent diffuse abdominal pain and episodes of loose stools related to consumption of certain foods, which is an ongoing issue. Appetite is good.  Denies nausea or vomiting. No dysphagia or odynophagia. No weight loss.No nocturnal symptoms.  Does not take NSAIDs. Quit smoking 5 years ago. Consumes alcohol occasionally, a few drinks a week.  Family history significant for colon cancer in brother and Hodgkin lymphoma in father (currently in remission).  Patient had normal colonoscopy in 2016.    ROS: 10pt ROS performed and otherwise negative.    PAST MEDICAL HISTORY:  Past Medical History:   Diagnosis Date     Anxiety 2002     Esophageal reflux 1/15/17    Started around this time again.     Hyperlipidemia       Stomach problems     gas, burning sensation       PREVIOUS ABDOMINAL/GYNECOLOGIC SURGERIES:  Past Surgical History:   Procedure Laterality Date     ABDOMEN SURGERY  2009    lap nissen     APPENDECTOMY  10/15/2009    age 30 approx     COLONOSCOPY  2014    Procedure: COLONOSCOPY;  Colonoscopy  ;  Surgeon: Abdon Pagan MD;  Location: [facility]     COLONOSCOPY N/A 2016    Procedure: COLONOSCOPY;  Surgeon: Christian Malagon MD;  Location: [facility]     ENDOSCOPY      many since he was 18     procedure for acid reflux  08/15/2009         PERTINENT MEDICATIONS:  Current Outpatient Medications   Medication Sig Dispense Refill     famotidine (PEPCID) 20 MG tablet Take 1 tablet (20 mg) by mouth 2 times daily as needed (gred) 60 tablet 1     No other OTC/herbal/supplements reported by patient.    SOCIAL HISTORY:  Social History     Socioeconomic History     Marital status:      Spouse name: Not on file     Number of children: Not on file     Years of education: Not on file     Highest education level: Not on file   Occupational History     Not on file   Tobacco Use     Smoking status: Former     Years: 10.00     Types: Cigarettes     Start date: 2016     Quit date: 2017     Years since quittin.0     Smokeless tobacco: Never     Tobacco comments:     Quit date is 2016   Vaping Use     Vaping Use: Never used   Substance and Sexual Activity     Alcohol use: Yes     Comment: occ     Drug use: No     Sexual activity: Yes     Partners: Female   Other Topics Concern     Parent/sibling w/ CABG, MI or angioplasty before 65F 55M? Not Asked   Social History Narrative     Not on file     Social Determinants of Health     Financial Resource Strain: Not on file   Food Insecurity: Not on file   Transportation Needs: Not on file   Physical Activity: Not on file   Stress: Not on file   Social Connections: Not on file   Intimate Partner Violence: Not on file   Housing Stability: Not on file        FAMILY HISTORY:  Denies /panc/esophageal CA, no other Kapoor or other HPS-related Shanda. No IBD/celiac, no other AI/liver/thyroid disease.    Family History   Problem Relation Age of Onset     Hypertension Mother      Diabetes Mother      Lipids Mother      Hyperlipidemia Mother      Lipids Father      Cancer Father         Non Hodgkins Lymphoma     Hyperlipidemia Father      Diabetes Maternal Grandmother      Hypertension Maternal Grandmother      Cerebrovascular Disease Maternal Grandmother         70     Cancer Maternal Grandmother         rectal cancer     Heart Disease Maternal Grandfather         later 50s     Breast Cancer Paternal Grandmother      Lipids Brother      Cancer Sister         skin cancer     Cancer - colorectal Brother 37     Hypothyroidism Brother      Thyroid Disease Brother        PHYSICAL EXAMINATION:  Vitals reviewed  There were no vitals taken for this visit.    General: Patient appears well in no acute distress.   Skin: No visualized rash or lesions on visualized skin  Eyes: EOMI, no erythema, sclera icterus or discharge noted  Resp: breathing comfortably without accessory muscle usage, speaking in full sentences, no cough  MSK: Appears to have normal range of motion based on visualized movements  Neurologic: No apparent tremors, facial movements symmetric  Psych: affect normal, alert and oriented      PERTINENT STUDIES Reviewed in EMR    ASSESSMENT/PLAN:  42 year old male  presented  to GI clinic for evaluation and management of recurrent acid reflux since approximately 2 years ago. Became an every day issue since 6 months ago. Cannot name any preceding events.  Acid reflux is accompanied by belching and some intermittent bloating and abdominal pain. Rare diarrhea, usually related to certain foods consumed.  Patient has history of Nissen fundoplication in 2009 and was symptoms free for about 10 years.I reviewed his endoscopy reports. Had large hiatal hernia on EGD in 2009. Normal  colonoscopy in 2016. Reviewed xray esophagram in 2017, negative for mucosal abnormalities, strictures, or filling defects. There was a non-specific intermittent retrograde peristaltic waves and upper esophageal stasis suggestive for possible esophageal dysmotility.  Patient was currently seen by his PCP and started on famotidine 20 mg bid. Stated that it is not effective- he still has daily issue with acid reflux. Denies dysphagia or odynophagia.  Recommended the following:  -Diet modification (see AVS)  -Start pantoprazole 40 mg in the morning;  -Switch famotidine to 40 mg in the evening;  -Referral to EGD with BRAVO  - OK to schedule colonoscopy as ordered by PCP due to family history of colon cancer in brother.    May need a referral back to his surgeon if unable to provide sufficient management of GERD by medications alone. Definitely,will repeat xray esophagram or proceed with esophageal manometry before a referral if the later is  indicated.       ICD-10-CM    1. History of Nissen fundoplication  Z98.890 pantoprazole (PROTONIX) 40 MG EC tablet     Adult GI Clinic Follow-Up Order (Blank)     Adult GI  Referral - Procedure Only      2. Gastroesophageal reflux disease with esophagitis without hemorrhage  K21.00 Adult GI  Referral - Consult Only     pantoprazole (PROTONIX) 40 MG EC tablet     Adult GI Clinic Follow-Up Order (Blank)     Adult GI  Referral - Procedure Only          RTC in 2 months    Thank you for this consultation. It was a pleasure to participate in the care of this patient; please contact us with any further questions.    MARTHA Marquez, FNP-C  [facility]  Gastroenterology Department  [place], [place]    This note was created with Dragon voice recognition software, and while reviewed for accuracy, inadvertent minor typographic errors may occur. Please contact the provider if you have any questions.

## 2023-01-30 NOTE — PATIENT INSTRUCTIONS
"It was a pleasure taking care of you today.  I've included a brief summary of our discussion and care plan from today's visit below.  Please review this information with your primary care provider.  ______________________________________________________________________    My recommendations are summarized as follows:    I ordered upper endoscopy with BRAVO study to further evaluate recurrence of your acid reflux issue.    2. Start pantoprazole 40 mg in the morning and take it 30-60 minutes before breakfast.     3. Switch famotidine (Pepcid) to before bed and take 40 mg dose.    4. Please modify your diet and avoid food that causing your acid reflux.    Return to GI Clinic in 2 months to review your progress.    ______________________________________________________________________                                 Gastroesophageal reflux  Gastroesophageal reflux, also called \"acid reflux,\" occurs when the stomach contents back up into the esophagus and/or mouth. Occasional reflux is normal and can happen in healthy infants, children, and adults, most often after eating a large meal. Most episodes are brief and do not cause bothersome symptoms or complications.   By contrast, people with gastroesophageal reflux disease (GERD) experience bothersome symptoms or damage to the esophagus as a result of acid reflux. Symptoms of GERD can include heartburn, regurgitation, and difficulty or pain with swallowing.     Lifestyle modifications for gastroesophageal reflux disease (GERD).   1. Change your eating habits.  -- It's best to eat several small meals instead of two or three large meals.  -- After you eat, wait 2 to 3 hours before you lie down. Late-night snacks aren't a good idea.   -- Chocolate, mint, and alcohol can make GERD worse. They relax the valve between the esophagus and the stomach.  -- Spicy foods, foods that have a lot of acid (like tomatoes and oranges), and coffee can make GERD symptoms worse in some people. " If your symptoms are worse after you eat a certain food, you may want to stop eating that food to see if your symptoms get better.    2. Do not smoke or chew tobacco. Avoid exposure to second hand smoking. Saliva helps to neutralize refluxed acid, and smoking reduces the amount of saliva in the mouth and throat. Smoking also lowers the pressure in the lower esophageal sphincter and provokes coughing, causing frequent episodes of acid reflux in the esophagus.     3. If you have GERD symptoms at night, raise the head of your bed 6 in. (15 cm) to 8 in. (20 cm) by putting the frame on blocks or placing a foam wedge under the head of your mattress. (Adding extra pillows does not work.)  4. Avoid or reduce pressure on your stomach. Don't wear tight clothing around your middle.  5. Lose weight if you need to. Losing just 5 to 10 pounds can help.              ______________________________________________________________________    Who do I call with any questions after my visit?  Please be in touch if there are any further questions that arise following today's visit.  There are multiple ways to contact your gastroenterology care team.      During business hours, you may reach a Gastroenterology nurse at [phone], option 3.     To schedule or reschedule an appointment, please call [phone].   To schedule your lab work at [facility], please call [phone]    You can always send a secure message through [employer].  [employer] messages are answered by your nurse or doctor typically within 24 hours.  Please allow extra time on weekends and holidays.      For urgent/emergent questions after business hours, you may reach the on-call GI Fellow by contacting the [facility]  at [phone].    In order for your refill to be processed in a timely fashion, it is your responsibility to ensure you follow the recommendations from your provider regarding your laboratory studies and  follow up appointments.       How will I get the results of any tests ordered?    You will receive all of your results.  If you have signed up for [employer], any tests ordered at your visit will be available to you after your physician reviews them.  Typically this takes 1-2 weeks.  If there are urgent results that require a change in your care plan, your physician or nurse will call you to discuss the next steps.   What is Gem[employer]ish[employer]?  [email or web address] is a secure way for you to access all of your healthcare records from the [facility].  It is a web based computer program, so you can sign on to it from any location.  It also allows you to send secure messages to your care team.  I recommend signing up for [email or web address] access if you have not already done so and are comfortable with using a computer.    How to I schedule a follow-up visit?  If you did not schedule a follow-up visit today, please call [phone] to schedule a follow-up office visit.      Sincerely,  AMINAH Marquez,  [facility],  Division of Gastroenterology   ([facility])

## 2023-03-06 ENCOUNTER — TELEPHONE (OUTPATIENT)
Dept: GASTROENTEROLOGY | Facility: CLINIC | Age: 43
End: 2023-03-06
Payer: COMMERCIAL

## 2023-03-06 ENCOUNTER — HOSPITAL ENCOUNTER (OUTPATIENT)
Facility: AMBULATORY SURGERY CENTER | Age: 43
End: 2023-03-06
Attending: INTERNAL MEDICINE
Payer: COMMERCIAL

## 2023-03-06 NOTE — TELEPHONE ENCOUNTER
Patient is already scheduled for colon/EGD at , but Ayleen Day entered EGD LINN order that can only be done at  or [facility] [place].     LVM for patient to call back to reschedule to [place] or . Stated that we can do both at the same time instead of two separate appts.

## 2023-03-06 NOTE — TELEPHONE ENCOUNTER
Patient did call back and he is rescheduled to 5/12/23 at [facility] for EGD bravo and Colonoscopy with Dr.J Teixeira and new info sent to him

## 2023-04-16 ENCOUNTER — HEALTH MAINTENANCE LETTER (OUTPATIENT)
Age: 43
End: 2023-04-16

## 2023-04-26 ENCOUNTER — TELEPHONE (OUTPATIENT)
Dept: GASTROENTEROLOGY | Facility: CLINIC | Age: 43
End: 2023-04-26

## 2023-04-26 NOTE — TELEPHONE ENCOUNTER
Patient scheduled for Upper endoscopy with Bravo capsule placement and Colonoscopy on 5/12/23.     Discuss Covid policy.     Pre op exam needed? N/A    Arrival time: 1200. Procedure time 1300    Facility location: [facility]; [address] [place] [address], [address] Floor, [place] [address]    Sedation type: MAC    Anticoagulations? No    Electronic implanted devices? No    Diabetic? No    Indication for procedure: Hx of Nissen in 2009. Daily acid reflux, belching, some diffuse abd.pain, screening    Bowel prep recommendation: Miralax prep without magnesium citrate    Prep instructions sent via [employer]     Pre visit planning completed.    Caroline Almazan, HANNA  Endoscopy Procedure Pre Assessment RN

## 2023-04-27 NOTE — TELEPHONE ENCOUNTER
Attempted to contact patient regarding upcoming Upper endoscopy with Bravo capsule placement and Colonoscopy procedure on 5/12/23 for pre assessment questions. No answer.     Left message to return call to [phone] #4    Have pt hold his pepcid and protonix x 7 days    Caroline Almazan, HANNA  Endoscopy Procedure Pre Assessment RN

## 2023-05-05 ENCOUNTER — PATIENT OUTREACH (OUTPATIENT)
Dept: GASTROENTEROLOGY | Facility: CLINIC | Age: 43
End: 2023-05-05
Payer: COMMERCIAL

## 2023-05-05 NOTE — TELEPHONE ENCOUNTER
Pre assessment questions completed for upcoming Upper endoscopy with Bravo capsule placement and colonoscopy procedures scheduled on 5/12/23    COVID policy reviewed.     Pre-op exam? N/A    Reviewed procedural arrival time 1200, procedure time 1300 and facility location [facility]; [address] [place] [address], [address] Floor, [place] [address]    Designated  policy reviewed. Instructed to have someone stay 24 hours post procedure.     Patient will hold protonix and pepcid x 7 days prior to procedure.    NSAIDs? No    Anticoagulation/blood thinners? No    Electronic implanted devices? No    Diabetic? No    Procedure indication: Hx of Nissen in 2009, daily reflux, belching    Bowel prep recommendation: Miralax prep without magnesium citrate    Reviewed procedure prep instructions.     Prep instructions sent via [employer].      Patient verbalized understanding and had no questions or concerns at this time.    Marlene Eastman RN  Endoscopy Procedure Pre Assessment RN

## 2023-05-10 ENCOUNTER — MYC MEDICAL ADVICE (OUTPATIENT)
Dept: GASTROENTEROLOGY | Facility: CLINIC | Age: 43
End: 2023-05-10
Payer: COMMERCIAL

## 2023-05-10 ENCOUNTER — TELEPHONE (OUTPATIENT)
Dept: GASTROENTEROLOGY | Facility: CLINIC | Age: 43
End: 2023-05-10
Payer: COMMERCIAL

## 2023-05-10 NOTE — TELEPHONE ENCOUNTER
Caller: Ashish Blas  Reason for Reschedule/Cancellation (please be detailed, any staff messages or encounters to note?): work conflict      Prior to reschedule please review:    Ordering Provider:kin    Sedation per order:mac    Does patient have any ASC Exclusions, please identify?: n      Notes on Cancelled Procedure:    Procedure:Upper Endoscopy with BRAVO [EGD BRAVO]  And colonoscopy     Date: 5/12    Location:[facility] [place]; [address] [place] [address], [address] [place] [address]    Surgeon: Puneet        Rescheduled: Yes    Procedure: Upper Endoscopy with BRAVO [EGD BRAVO] and colonoscopy     Date: 8/18    Location:[facility] [place]; [address] [place] [address], [address] [place] [address]    Surgeon: Puneet    Sedation Level Scheduled  mac,  Reason for Sedation Level ordered    Prep/Instructions updated and sent: jayden

## 2023-06-21 ENCOUNTER — E-VISIT (OUTPATIENT)
Dept: FAMILY MEDICINE | Facility: CLINIC | Age: 43
End: 2023-06-21
Payer: COMMERCIAL

## 2023-06-21 DIAGNOSIS — F32.1 CURRENT MODERATE EPISODE OF MAJOR DEPRESSIVE DISORDER WITHOUT PRIOR EPISODE (H): Primary | ICD-10-CM

## 2023-06-21 PROCEDURE — 99207 PR NON-BILLABLE SERV PER CHARTING: CPT | Performed by: NURSE PRACTITIONER

## 2023-06-21 ASSESSMENT — PATIENT HEALTH QUESTIONNAIRE - PHQ9
SUM OF ALL RESPONSES TO PHQ QUESTIONS 1-9: 9
SUM OF ALL RESPONSES TO PHQ QUESTIONS 1-9: 9
10. IF YOU CHECKED OFF ANY PROBLEMS, HOW DIFFICULT HAVE THESE PROBLEMS MADE IT FOR YOU TO DO YOUR WORK, TAKE CARE OF THINGS AT HOME, OR GET ALONG WITH OTHER PEOPLE: VERY DIFFICULT

## 2023-06-21 ASSESSMENT — ANXIETY QUESTIONNAIRES
7. FEELING AFRAID AS IF SOMETHING AWFUL MIGHT HAPPEN: NEARLY EVERY DAY
2. NOT BEING ABLE TO STOP OR CONTROL WORRYING: NEARLY EVERY DAY
8. IF YOU CHECKED OFF ANY PROBLEMS, HOW DIFFICULT HAVE THESE MADE IT FOR YOU TO DO YOUR WORK, TAKE CARE OF THINGS AT HOME, OR GET ALONG WITH OTHER PEOPLE?: EXTREMELY DIFFICULT
GAD7 TOTAL SCORE: 19
4. TROUBLE RELAXING: NEARLY EVERY DAY
5. BEING SO RESTLESS THAT IT IS HARD TO SIT STILL: MORE THAN HALF THE DAYS
3. WORRYING TOO MUCH ABOUT DIFFERENT THINGS: NEARLY EVERY DAY
6. BECOMING EASILY ANNOYED OR IRRITABLE: MORE THAN HALF THE DAYS
1. FEELING NERVOUS, ANXIOUS, OR ON EDGE: NEARLY EVERY DAY
GAD7 TOTAL SCORE: 19
GAD7 TOTAL SCORE: 19
7. FEELING AFRAID AS IF SOMETHING AWFUL MIGHT HAPPEN: NEARLY EVERY DAY

## 2023-06-22 ASSESSMENT — PATIENT HEALTH QUESTIONNAIRE - PHQ9: SUM OF ALL RESPONSES TO PHQ QUESTIONS 1-9: 9

## 2023-06-22 ASSESSMENT — ANXIETY QUESTIONNAIRES: GAD7 TOTAL SCORE: 19

## 2023-08-02 NOTE — TELEPHONE ENCOUNTER
Attempted to contact patient in order to complete pre assessment questions.     No answer. Left message to return call to [phone] option 4      Josefa Kapoor RN  Endoscopy Procedure Pre Assessment RN

## 2023-08-02 NOTE — TELEPHONE ENCOUNTER
Pre visit planning completed.      Procedure details:    Patient scheduled for Upper endoscopy (EGD) with BRAVO capsule placement, colonoscopy on 8/18/2023.     Arrival time: 0700. Procedure time 0800    Pre op exam needed? N/A    Facility location: [facility]; [address] [place] [address], [address] Floor, [place] [address]    Sedation type: MAC    Indication for procedure: Gastroesophageal reflux disease with esophagitis without hemorrhage, screening colonoscopy      Chart review:     Electronic implanted devices? No    Diabetic? No    Diabetic medication HOLDING recommendations: (if applicable)  Oral diabetic medications: N/A  Diabetic injectables: N/A  Insulin: N/A      Medication review:    Anticoagulants? No    NSAIDS? No NSAID medications per patient's medication list.  RN will verify with pre-assessment call.    Other medication HOLDING recommendations:  PPIs: HOLD 7 days before procedure.- Protonix and Pepcid      Prep for procedure:     Bowel prep recommendation: Miralax prep without magnesium citrate   Due to:  standard bowel prep.    Prep instructions sent via [employer]     Josefa Kapoor RN  Endoscopy Procedure Pre Assessment RN  [phone] option 4

## 2023-08-09 NOTE — TELEPHONE ENCOUNTER
Second call attempt to complete pre assessment.     No answer.  Left message to return call to [phone] #4 within 24 hours or risk procedure being cancelled.     Additional information needed?  BRAVO= PPI holding 7 days prior.    Note - procedure is a colonoscopy and EGD w/LINN.      Caroline Aguilar, RN  Endoscopy Procedure Pre Assessment RN

## 2023-08-10 ENCOUNTER — TELEPHONE (OUTPATIENT)
Dept: GASTROENTEROLOGY | Facility: CLINIC | Age: 43
End: 2023-08-10
Payer: COMMERCIAL

## 2023-08-10 NOTE — TELEPHONE ENCOUNTER
No return call received.   Pre assessment was not completed for upcoming scheduled procedure.     Staff message sent to endoscopy scheduling to cancel procedure per policy.       Caroline Almazan RN   Endoscopy Procedure Pre Assessment RN

## 2023-08-10 NOTE — TELEPHONE ENCOUNTER
----- Message from Caroline Almazan RN sent at 8/10/2023  1:25 PM CDT -----  Regarding: Cancel procedure  Pre assessment was not completed for upcoming Upper endoscopy (EGD) with BRAVO capsule placement scheduled on 8/18/23.    Please cancel per policy.       Thank you,     Caroline Almazan RN  Endoscopy Procedure Pre Assessment RN  [phone] option 4

## 2023-08-10 NOTE — TELEPHONE ENCOUNTER
Caller:   Reason for Reschedule/Cancellation (please be detailed, any staff messages or encounters to note?): pre assessment not completed       Prior to reschedule please review:  Ordering Provider: Riik Johnson  Sedation per order: moderate  Does patient have any ASC Exclusions, please identify?: n      Notes on Cancelled Procedure:  Procedure: Lower Endoscopy [Colonoscopy]   Date: 08/18/2023  [facility]; [address] [place] [address], 5[address] Floor, [place] [address]  Surgeon: ANNELIESE Teixeira      Rescheduled: No   No

## 2023-08-11 ENCOUNTER — PATIENT OUTREACH (OUTPATIENT)
Dept: GASTROENTEROLOGY | Facility: CLINIC | Age: 43
End: 2023-08-11
Payer: COMMERCIAL

## 2023-08-11 NOTE — TELEPHONE ENCOUNTER
Reached out to pt to confirm their procedure cancellation for 8/18 - as this includes cancellation of Bravo study.  Left detailed vm with endoscopy number and writer's number to use as needed.

## 2023-08-17 NOTE — TELEPHONE ENCOUNTER
Writer called patient to schedule Hansen.  Left voicemail to return call.  Sent [employer]t message.    Okay to offer:  Location: [facility]; [address] [place] [address], [address] Floor, [place] [address]    Date:  10/18 with Anticipated Arrival Time: 8:45AM    11/17 with Anticipated Arrival Time: 9:30AM          Okay to transfer to [facility] if available.  Please inform [facility] if patient is scheduled.

## 2023-08-23 NOTE — TELEPHONE ENCOUNTER
2nd attempt - Writer called patient to schedule Hansen.  Left voicemail to return call.  Sent mobifriendst message.    Okay to offer:  Location: [facility]; [address] [place] [address], [address] Floor, [place] [address]    Date:  10/18 with Anticipated Arrival Time: 8:45AM    11/17 with Anticipated Arrival Time: 9:30AM            Okay to transfer to [facility] if available.  Please inform [facility] if patient is scheduled.

## 2023-08-24 NOTE — PROGRESS NOTES
"Date: 2020 10:01:06  Clinician: Deacon Garrido  Clinician NPI: [id number]  Patient: Ashish Blas  Patient : 1980  Patient Address: [address] [place], [place] [address]  Patient Phone: [phone]  Visit Protocol: URI  Patient Summary:  Ashish is a 40 year old ( : 1980 ) male who initiated a [facility] Visit for COVID-19 (Coronavirus) evaluation and screening. When asked the question \"Please sign me up to receive news, health information and promotions. \", Ashish responded \"No\".    Ashish states his symptoms started 1-2 days ago.   His symptoms consist of myalgia, malaise, a sore throat, diarrhea, a headache, a cough, and nasal congestion. He is experiencing difficulty breathing due to nasal congestion but he is not short of breath.   Symptom details     Nasal secretions: The color of his mucus is green.    Cough: Ashish coughs a few times an hour and his cough is not more bothersome at night. Phlegm does not come into his throat when he coughs. He does not believe his cough is caused by post-nasal drip.     Sore throat: Ashish reports having moderate throat pain (4-6 on a 10 point pain scale), does not have exudate on his tonsils, and can swallow liquids. The lymph nodes in his neck are not enlarged. A rash has not appeared on the skin since the sore throat started.     Headache: He states the headache is mild (1-3 on a 10 point pain scale).      Ashish denies having vomiting, rhinitis, facial pain or pressure, chills, teeth pain, ageusia, ear pain, wheezing, fever, enlarged lymph nodes, nausea, and anosmia. He also denies taking antibiotic medication in the past month, having recent facial or sinus surgery in the past 60 days, and having a sinus infection within the past year.   Precipitating events  Within the past week, Ashish has not been exposed to someone with strep throat. He has not recently been exposed to someone with influenza. Ashish has been in close contact with the following high risk " individuals: adults 65 or older.   Pertinent COVID-19 (Coronavirus) information  Ashish does not work or volunteer as healthcare worker or a . In the past 14 days, Ashish has not worked or volunteered at a healthcare facility or group living setting.   In the past 14 days, he also has not lived in a congregate living setting.   Ashish has not had a close contact with a laboratory-confirmed COVID-19 patient within 14 days of symptom onset.    Since December 2019, Ashish has not been tested for COVID-19 and has not had upper respiratory infection or influenza-like illness.   Pertinent medical history  Ashish needs a return to work/school note.   Weight: 200 lbs   Ashish does not smoke or use smokeless tobacco.   Weight: 200 lbs    MEDICATIONS: Xanax oral, ALLERGIES: NKDA  Clinician Response:  Dear Ashish,   Your symptoms show that you may have coronavirus (COVID-19). This illness can cause fever, cough and trouble breathing. Many people get a mild case and get better on their own. Some people can get very sick.  What should I do?  We would like to test you for this virus.   1. Please call [phone] to schedule your visit. Explain that you were [facility] to have a COVID-19 test. Be ready to share your [facility] visit ID number.  The following will serve as your written order for this COVID Test, ordered by me, for the indication of suspected COVID [Z20.828]: The test will be ordered in [facility], our electronic health record, after you are scheduled. It will show as ordered and authorized by Ryan Colin MD.  Order: COVID-19 (Coronavirus) PCR for SYMPTOMATIC testing from [facility].   2. When it's time for your COVID test:  Stay at least 6 feet away from others. (If someone will drive you to your test, stay in the backseat, as far away from the  as you can.)   Cover your mouth and nose with a mask, tissue or washcloth.  Go straight to the testing site. Don't make any stops on the way there or back.       "3.Starting now: Stay home and away from others (self-isolate) until:   You've had no fever---and no medicine that reduces fever---for one full day (24 hours). And...   Your other symptoms have gotten better. For example, your cough or breathing has improved. And...   At least 10 days have passed since your symptoms started.       During this time, don't leave the house except for testing or medical care.   Stay in your own room, even for meals. Use your own bathroom if you can.   Stay away from others in your home. No hugging, kissing or shaking hands. No visitors.  Don't go to work, school or anywhere else.    Clean \"high touch\" surfaces often (doorknobs, counters, handles, etc.). Use a household cleaning spray or wipes. You'll find a full list of  on the EPA website: www.epa.gov/pesticide-registration/list-n-disinfectants-use-against-sars-cov-2.   Cover your mouth and nose with a mask, tissue or washcloth to avoid spreading germs.  Wash your hands and face often. Use soap and water.  Caregivers in these groups are at risk for severe illness due to COVID-19:  o People 65 years and older  o People who live in a nursing home or long-term care facility  o People with chronic disease (lung, heart, cancer, diabetes, kidney, liver, immunologic)  o People who have a weakened immune system, including those who:   Are in cancer treatment  Take medicine that weakens the immune system, such as corticosteroids  Had a bone marrow or organ transplant  Have an immune deficiency  Have poorly controlled HIV or AIDS  Are obese (body mass index of 40 or higher)  Smoke regularly   o Caregivers should wear gloves while washing dishes, handling laundry and cleaning bedrooms and bathrooms.  o Use caution when washing and drying laundry: Don't shake dirty laundry, and use the warmest water setting that you can.  o For more tips, go to www.cdc.gov/coronavirus/2019-ncov/downloads/10Things.pdf.    4.Sign up for [facility]. We know " it's scary to hear that you might have COVID-19. We want to track your symptoms to make sure you're okay over the next 2 weeks. Please look for an email from [employer]---this is a free, online program that we'll use to keep in touch. To sign up, follow the link in the email. Learn more at [email or web address]  How can I take care of myself?   Get lots of rest. Drink extra fluids (unless a doctor has told you not to).   Take Tylenol (acetaminophen) for fever or pain. If you have liver or kidney problems, ask your family doctor if it's okay to take Tylenol.   Adults can take either:    650 mg (two 325 mg pills) every 4 to 6 hours, or...   1,000 mg (two 500 mg pills) every 8 hours as needed.    Note: Don't take more than 3,000 mg in one day. Acetaminophen is found in many medicines (both prescribed and over-the-counter medicines). Read all labels to be sure you don't take too much.   For children, check the Tylenol bottle for the right dose. The dose is based on the child's age or weight.    If you have other health problems (like cancer, heart failure, an organ transplant or severe kidney disease): Call your specialty clinic if you don't feel better in the next 2 days.       Know when to call 911. Emergency warning signs include:    Trouble breathing or shortness of breath Pain or pressure in the chest that doesn't go away Feeling confused like you haven't felt before, or not being able to wake up Bluish-colored lips or face.  Where can I get more information?    [facility] -- About COVID-19: [email or web address]   CDC -- What to Do If You're Sick: www.cdc.gov/coronavirus/2019-ncov/about/steps-when-sick.html   CDC -- Ending Home Isolation: www.cdc.gov/coronavirus/2019-ncov/hcp/disposition-in-home-patients.html   CDC -- Caring for Someone: www.cdc.gov/coronavirus/2019-ncov/if-you-are-sick/care-for-someone.html   [facility] -- Interim Guidance for Hospital Discharge to Home:  www.health.[facility].mn.us/diseases/coronavirus/hcp/hospdischarge.pdf   [facility] clinical trials (COVID-19 research studies): clinicalaffairs.[facility]/umn-clinical-trials    Below are the COVID-19 hotlines at the [facility] of Health ([facility]). Interpreters are available.    For health questions: Call [phone] or 1-850.999.8710 (7 a.m. to 7 p.m.) For questions about schools and childcare: Call 392-392-5062 or 1-777.595.5747 (7 a.m. to 7 p.m.)    Diagnosis: Contact with and (suspected) exposure to other viral communicable diseases  Diagnosis ICD: Z20.828   Tazorac Pregnancy And Lactation Text: This medication is not safe during pregnancy. It is unknown if this medication is excreted in breast milk.

## 2023-08-31 NOTE — TELEPHONE ENCOUNTER
3rd Attempt - Left message to reschedule [facility] procedure.  Closing the loop. Dates offered are released to other patients.    If patient calls back to reschedule please reach out to [facility] for new dates.

## 2023-12-07 ASSESSMENT — ANXIETY QUESTIONNAIRES
GAD7 TOTAL SCORE: 21
GAD7 TOTAL SCORE: 21
7. FEELING AFRAID AS IF SOMETHING AWFUL MIGHT HAPPEN: NEARLY EVERY DAY
3. WORRYING TOO MUCH ABOUT DIFFERENT THINGS: NEARLY EVERY DAY
IF YOU CHECKED OFF ANY PROBLEMS ON THIS QUESTIONNAIRE, HOW DIFFICULT HAVE THESE PROBLEMS MADE IT FOR YOU TO DO YOUR WORK, TAKE CARE OF THINGS AT HOME, OR GET ALONG WITH OTHER PEOPLE: EXTREMELY DIFFICULT
5. BEING SO RESTLESS THAT IT IS HARD TO SIT STILL: NEARLY EVERY DAY
6. BECOMING EASILY ANNOYED OR IRRITABLE: NEARLY EVERY DAY
2. NOT BEING ABLE TO STOP OR CONTROL WORRYING: NEARLY EVERY DAY
4. TROUBLE RELAXING: NEARLY EVERY DAY
1. FEELING NERVOUS, ANXIOUS, OR ON EDGE: NEARLY EVERY DAY

## 2023-12-07 ASSESSMENT — PATIENT HEALTH QUESTIONNAIRE - PHQ9
SUM OF ALL RESPONSES TO PHQ QUESTIONS 1-9: 20
10. IF YOU CHECKED OFF ANY PROBLEMS, HOW DIFFICULT HAVE THESE PROBLEMS MADE IT FOR YOU TO DO YOUR WORK, TAKE CARE OF THINGS AT HOME, OR GET ALONG WITH OTHER PEOPLE: EXTREMELY DIFFICULT
SUM OF ALL RESPONSES TO PHQ QUESTIONS 1-9: 20

## 2023-12-08 ENCOUNTER — TELEPHONE (OUTPATIENT)
Dept: FAMILY MEDICINE | Facility: CLINIC | Age: 43
End: 2023-12-08

## 2023-12-08 ENCOUNTER — OFFICE VISIT (OUTPATIENT)
Dept: FAMILY MEDICINE | Facility: CLINIC | Age: 43
End: 2023-12-08
Payer: COMMERCIAL

## 2023-12-08 VITALS
TEMPERATURE: 97.8 F | HEART RATE: 60 BPM | WEIGHT: 198 LBS | HEIGHT: 73 IN | BODY MASS INDEX: 26.24 KG/M2 | OXYGEN SATURATION: 97 % | RESPIRATION RATE: 16 BRPM | SYSTOLIC BLOOD PRESSURE: 118 MMHG | DIASTOLIC BLOOD PRESSURE: 81 MMHG

## 2023-12-08 DIAGNOSIS — E78.2 MIXED HYPERLIPIDEMIA: ICD-10-CM

## 2023-12-08 DIAGNOSIS — F41.1 GAD (GENERALIZED ANXIETY DISORDER): Primary | ICD-10-CM

## 2023-12-08 DIAGNOSIS — F41.0 PANIC ATTACK: ICD-10-CM

## 2023-12-08 LAB
ALBUMIN SERPL BCG-MCNC: 4.9 G/DL (ref 3.5–5.2)
ALP SERPL-CCNC: 55 U/L (ref 40–150)
ALT SERPL W P-5'-P-CCNC: 58 U/L (ref 0–70)
ANION GAP SERPL CALCULATED.3IONS-SCNC: 10 MMOL/L (ref 7–15)
AST SERPL W P-5'-P-CCNC: 42 U/L (ref 0–45)
BILIRUB SERPL-MCNC: 0.5 MG/DL
BUN SERPL-MCNC: 7 MG/DL (ref 6–20)
CALCIUM SERPL-MCNC: 10.1 MG/DL (ref 8.6–10)
CHLORIDE SERPL-SCNC: 100 MMOL/L (ref 98–107)
CHOLEST SERPL-MCNC: 229 MG/DL
CREAT SERPL-MCNC: 0.66 MG/DL (ref 0.67–1.17)
DEPRECATED HCO3 PLAS-SCNC: 30 MMOL/L (ref 22–29)
EGFRCR SERPLBLD CKD-EPI 2021: >90 ML/MIN/1.73M2
FASTING STATUS PATIENT QL REPORTED: NO
GLUCOSE SERPL-MCNC: 105 MG/DL (ref 70–99)
HDLC SERPL-MCNC: 76 MG/DL
LDLC SERPL CALC-MCNC: 127 MG/DL
NONHDLC SERPL-MCNC: 153 MG/DL
POTASSIUM SERPL-SCNC: 4.6 MMOL/L (ref 3.4–5.3)
PROT SERPL-MCNC: 7.8 G/DL (ref 6.4–8.3)
SODIUM SERPL-SCNC: 140 MMOL/L (ref 135–145)
TRIGL SERPL-MCNC: 129 MG/DL
TSH SERPL DL<=0.005 MIU/L-ACNC: 1.08 UIU/ML (ref 0.3–4.2)

## 2023-12-08 PROCEDURE — 99214 OFFICE O/P EST MOD 30 MIN: CPT | Performed by: NURSE PRACTITIONER

## 2023-12-08 PROCEDURE — 84443 ASSAY THYROID STIM HORMONE: CPT | Performed by: NURSE PRACTITIONER

## 2023-12-08 PROCEDURE — 80061 LIPID PANEL: CPT | Performed by: NURSE PRACTITIONER

## 2023-12-08 PROCEDURE — 80053 COMPREHEN METABOLIC PANEL: CPT | Performed by: NURSE PRACTITIONER

## 2023-12-08 PROCEDURE — 36415 COLL VENOUS BLD VENIPUNCTURE: CPT | Performed by: NURSE PRACTITIONER

## 2023-12-08 RX ORDER — ESCITALOPRAM OXALATE 10 MG/1
TABLET ORAL
Qty: 53 TABLET | Refills: 0 | Status: SHIPPED | OUTPATIENT
Start: 2023-12-08 | End: 2024-01-07

## 2023-12-08 RX ORDER — HYDROXYZINE HYDROCHLORIDE 25 MG/1
25 TABLET, FILM COATED ORAL EVERY 6 HOURS PRN
Qty: 60 TABLET | Refills: 0 | Status: SHIPPED | OUTPATIENT
Start: 2023-12-08 | End: 2024-01-15

## 2023-12-08 RX ORDER — ALPRAZOLAM 0.25 MG
0.25 TABLET ORAL 2 TIMES DAILY PRN
Qty: 20 TABLET | Refills: 0 | Status: SHIPPED | OUTPATIENT
Start: 2023-12-08 | End: 2024-01-01

## 2023-12-08 ASSESSMENT — ENCOUNTER SYMPTOMS: NERVOUS/ANXIOUS: 1

## 2023-12-08 ASSESSMENT — PAIN SCALES - GENERAL: PAINLEVEL: NO PAIN (0)

## 2023-12-08 NOTE — PROGRESS NOTES
"  Assessment & Plan     CELSO (generalized anxiety disorder)  Uncontrolled patient presently is not on medications recommend starting Lexapro is having panic attacks and social situations will give us limited dose of Xanax .  Also recommend counseling referral has been placed- TSH with free T4 reflex  - escitalopram (LEXAPRO) 10 MG tablet  Dispense: 53 tablet; Refill: 0  - hydrOXYzine HCl (ATARAX) 25 MG tablet  Dispense: 60 tablet; Refill: 0  - Adult Mental Health  Referral  - TSH with free T4 reflex    Mixed hyperlipidemia  - Comprehensive metabolic panel (BMP + Alb, Alk Phos, ALT, AST, Total. Bili, TP)  - Lipid panel reflex to direct LDL Fasting  - Lipid panel reflex to direct LDL Fasting  - Comprehensive metabolic panel (BMP + Alb, Alk Phos, ALT, AST, Total. Bili, TP)    Panic attack  Uncontrolled patient presently is not on medications recommend starting Lexapro is having panic attacks and social situations will give us limited dose of Xanax .  Also recommend counseling referral has been placed  - ALPRAZolam (XANAX) 0.25 MG tablet  Dispense: 20 tablet; Refill: 0    956}     BMI:   Estimated body mass index is 26.48 kg/m  as calculated from the following:    Height as of this encounter: 1.842 m (6' 0.5\").    Weight as of this encounter: 89.8 kg (198 lb).   Weight management plan: Discussed healthy diet and exercise guidelines    See Patient Instructions    MARTHA Almaraz CNP  [facility]    Nohemi Hinojosa is a 43 year old, presenting for the following health issues:  Anxiety      12/8/2023    12:47 PM   Additional Questions   Roomed by Ivana LINDA       Anxiety    History of Present Illness       Mental Health Follow-up:  Patient presents to follow-up on Depression & Anxiety.Patient's depression since last visit has been:  Worse  The patient is not having other symptoms associated with depression.  Patient's anxiety since last visit has been:  Worse  The patient is having " "other symptoms associated with anxiety.  Any significant life events: No  Patient is feeling anxious or having panic attacks.  Patient has no concerns about alcohol or drug use.    He eats 2-3 servings of fruits and vegetables daily.He consumes 0 sweetened beverage(s) daily.He exercises with enough effort to increase his heart rate 9 or less minutes per day.  He exercises with enough effort to increase his heart rate 3 or less days per week.   He is taking medications regularly.       Review of Systems   Psychiatric/Behavioral:  The patient is nervous/anxious.       Constitutional, HEENT, cardiovascular, pulmonary, gi and gu systems are negative, except as otherwise noted.      Objective    /81   Pulse 60   Temp 97.8  F (36.6  C) (Tympanic)   Resp 16   Ht 1.842 m (6' 0.5\")   Wt 89.8 kg (198 lb)   SpO2 97%   BMI 26.48 kg/m    There is no height or weight on file to calculate BMI.  Physical Exam   GENERAL: healthy, alert and no distress  EYES: Eyes grossly normal to inspection, PERRL and conjunctivae and sclerae normal  HENT: ear canals and TM's normal, nose and mouth without ulcers or lesions  NECK: no adenopathy, no asymmetry, masses, or scars and thyroid normal to palpation  RESP: lungs clear to auscultation - no rales, rhonchi or wheezes  CV: regular rate and rhythm, normal S1 S2, no S3 or S4, no murmur, click or rub, no peripheral edema and peripheral pulses strong  ABDOMEN: soft, nontender, no hepatosplenomegaly, no masses and bowel sounds normal  MS: no gross musculoskeletal defects noted, no edema  SKIN: no suspicious lesions or rashes  NEURO: Normal strength and tone, mentation intact and speech normal  PSYCH: mentation appears normal, affect normal/bright    No results found for any visits on 12/08/23.                  "

## 2023-12-08 NOTE — TELEPHONE ENCOUNTER
Prior Authorization Retail Medication Request    Medication/Dose: Escitalopram Oxalate 10MG tablet  Diagnosis and ICD code (if different than what is on RX):    New/renewal/insurance change PA/secondary ins. PA:  Previously Tried and Failed:    Rationale:      Insurance   Primary:   Insurance ID:      Secondary (if applicable):  Insurance ID:      Pharmacy Information (if different than what is on RX)  Name:  [facility]   Phone:  [phone]  Fax:[phone]    Cover my meds  Key: [id number]

## 2023-12-13 NOTE — TELEPHONE ENCOUNTER
Prior Authorization Not Needed per Insurance    Medication: ESCITALOPRAM OXALATE 10 MG PO TABS  Insurance Company: [employer] - Phone [phone] Fax [phone]  Expected CoPay: $    Pharmacy Filling the Rx: [facility] [address] [place] [address]  Pharmacy Notified: y  Patient Notified: y - pharmacy to notify    Called pharmacy, they confirm they have paid claim.

## 2024-01-01 ENCOUNTER — MYC REFILL (OUTPATIENT)
Dept: FAMILY MEDICINE | Facility: CLINIC | Age: 44
End: 2024-01-01
Payer: COMMERCIAL

## 2024-01-01 DIAGNOSIS — F41.0 PANIC ATTACK: ICD-10-CM

## 2024-01-02 RX ORDER — ALPRAZOLAM 0.25 MG
0.25 TABLET ORAL 2 TIMES DAILY PRN
Qty: 20 TABLET | Refills: 0 | Status: SHIPPED | OUTPATIENT
Start: 2024-01-02 | End: 2024-01-15

## 2024-01-15 ENCOUNTER — VIRTUAL VISIT (OUTPATIENT)
Dept: FAMILY MEDICINE | Facility: CLINIC | Age: 44
End: 2024-01-15
Attending: NURSE PRACTITIONER
Payer: COMMERCIAL

## 2024-01-15 ENCOUNTER — MYC REFILL (OUTPATIENT)
Dept: FAMILY MEDICINE | Facility: CLINIC | Age: 44
End: 2024-01-15

## 2024-01-15 DIAGNOSIS — F41.0 PANIC ATTACK: ICD-10-CM

## 2024-01-15 DIAGNOSIS — F41.1 GAD (GENERALIZED ANXIETY DISORDER): ICD-10-CM

## 2024-01-15 DIAGNOSIS — F41.1 GAD (GENERALIZED ANXIETY DISORDER): Primary | ICD-10-CM

## 2024-01-15 DIAGNOSIS — F32.1 CURRENT MODERATE EPISODE OF MAJOR DEPRESSIVE DISORDER WITHOUT PRIOR EPISODE (H): ICD-10-CM

## 2024-01-15 PROCEDURE — 99214 OFFICE O/P EST MOD 30 MIN: CPT | Mod: 95 | Performed by: NURSE PRACTITIONER

## 2024-01-15 RX ORDER — ESCITALOPRAM OXALATE 10 MG/1
10 TABLET ORAL DAILY
Qty: 7 TABLET | Refills: 0 | Status: SHIPPED | OUTPATIENT
Start: 2024-01-15

## 2024-01-15 RX ORDER — ALPRAZOLAM 0.25 MG
0.25 TABLET ORAL 2 TIMES DAILY PRN
Qty: 20 TABLET | Refills: 0 | Status: SHIPPED | OUTPATIENT
Start: 2024-01-15 | End: 2024-02-07

## 2024-01-15 RX ORDER — HYDROXYZINE HYDROCHLORIDE 25 MG/1
25 TABLET, FILM COATED ORAL EVERY 6 HOURS PRN
Qty: 60 TABLET | Refills: 0 | Status: SHIPPED | OUTPATIENT
Start: 2024-01-15 | End: 2024-03-01

## 2024-01-15 NOTE — PROGRESS NOTES
Ashish is a 43 year old who is being evaluated via a billable video visit.      How would you like to obtain your AVS? MyChart  If the video visit is dropped, the invitation should be resent by: Text to cell phone: [phone]  Will anyone else be joining your video visit? No          Assessment & Plan     CELSO (generalized anxiety disorder)  Uncontrolled patient is not tolerating the Lexapro increased symptoms of anxiety we will titrate off Lexapro and start Prozac also recommend referral to mental health for medication management as patient has had various medications that have not helped his symptoms.  Patient is also looking into counseling  - escitalopram (LEXAPRO) 10 MG tablet; Take 1 tablet (10 mg) by mouth daily  - FLUoxetine (PROZAC) 20 MG capsule; Take 1 capsule (20 mg) by mouth daily for 7 days, THEN 2 capsules (40 mg) daily for 23 days.  - Adult Mental Health  Referral; Future    Current moderate episode of major depressive disorder without prior episode (H)  See above  - escitalopram (LEXAPRO) 10 MG tablet; Take 1 tablet (10 mg) by mouth daily  - FLUoxetine (PROZAC) 20 MG capsule; Take 1 capsule (20 mg) by mouth daily for 7 days, THEN 2 capsules (40 mg) daily for 23 days.  - Adult Mental Health  Referral; Future    MARTHA Almaraz [facility]    Nohemi Hinojosa is a 43 year old, presenting for the following health issues:  No chief complaint on file.        1/15/2024    11:22 AM   Additional Questions   Roomed by Suzanne PORTER     Depression and Anxiety Follow-Up  How are you doing with your depression since your last visit? No change  How are you doing with your anxiety since your last visit?  No change  Are you having other symptoms that might be associated with depression or anxiety? No  Have you had a significant life event? No   Do you have any concerns with your use of alcohol or other drugs? No    Social History     Tobacco Use    Smoking  status: Former     Years: 10     Types: Cigarettes     Start date: 2016     Quit date: 2017     Years since quittin.0    Smokeless tobacco: Current     Types: Chew    Tobacco comments:     Quit date is 2016   Vaping Use    Vaping Use: Never used   Substance Use Topics    Alcohol use: Yes     Comment: occ    Drug use: No         10/19/2022    10:48 AM 2023     1:46 PM 2023     1:46 PM   PHQ   PHQ-9 Total Score 0 9 20   Q9: Thoughts of better off dead/self-harm past 2 weeks Not at all Not at all Not at all         10/27/2020     1:30 PM 2023     1:46 PM 2023     1:46 PM   CELSO-7 SCORE   Total Score  19 (severe anxiety) 21 (severe anxiety)   Total Score 8 19 21           Review of Systems   Constitutional, HEENT, cardiovascular, pulmonary, gi and gu systems are negative, except as otherwise noted.      Objective           Vitals:  No vitals were obtained today due to virtual visit.    Physical Exam   GENERAL: Healthy, alert and no distress  EYES: Eyes grossly normal to inspection.  No discharge or erythema, or obvious scleral/conjunctival abnormalities.  RESP: No audible wheeze, cough, or visible cyanosis.  No visible retractions or increased work of breathing.    SKIN: Visible skin clear. No significant rash, abnormal pigmentation or lesions.  NEURO: Cranial nerves grossly intact.  Mentation and speech appropriate for age.  PSYCH: Mentation appears normal, affect normal/bright, judgement and insight intact, normal speech and appearance well-groomed.    No results found for any visits on 01/15/24.            Video-Visit Details    Type of service:  Video Visit     Originating Location (pt. Location): Home    Distant Location (provider location):  On-site  Platform used for Video Visit: SproutBox

## 2024-01-29 ENCOUNTER — MYC REFILL (OUTPATIENT)
Dept: FAMILY MEDICINE | Facility: CLINIC | Age: 44
End: 2024-01-29
Payer: COMMERCIAL

## 2024-01-29 DIAGNOSIS — F41.0 PANIC ATTACK: ICD-10-CM

## 2024-01-29 RX ORDER — ALPRAZOLAM 0.25 MG
0.25 TABLET ORAL 2 TIMES DAILY PRN
Qty: 20 TABLET | Refills: 0 | OUTPATIENT
Start: 2024-01-29

## 2024-02-07 ENCOUNTER — MYC REFILL (OUTPATIENT)
Dept: FAMILY MEDICINE | Facility: CLINIC | Age: 44
End: 2024-02-07
Payer: COMMERCIAL

## 2024-02-07 DIAGNOSIS — F41.0 PANIC ATTACK: ICD-10-CM

## 2024-02-08 NOTE — TELEPHONE ENCOUNTER
Routing refill request to provider for review/approval because:  Drug not on the [facility] refill protocol   Last Written Prescription Date:  1/15/24  Last Fill Quantity: 20,  # refills: 0   Last office visit: 12/8/2023 ; last virtual visit: 1/15/2024 with prescribing provider:     Future Office Visit:  2/16/24 with Michelle Lyn NP.    Julie Behrendt RN

## 2024-02-09 RX ORDER — ALPRAZOLAM 0.25 MG
0.25 TABLET ORAL 2 TIMES DAILY PRN
Qty: 20 TABLET | Refills: 0 | Status: SHIPPED | OUTPATIENT
Start: 2024-02-09 | End: 2024-03-01

## 2024-02-29 DIAGNOSIS — F41.1 GAD (GENERALIZED ANXIETY DISORDER): ICD-10-CM

## 2024-02-29 DIAGNOSIS — F32.1 CURRENT MODERATE EPISODE OF MAJOR DEPRESSIVE DISORDER WITHOUT PRIOR EPISODE (H): ICD-10-CM

## 2024-03-01 ENCOUNTER — MYC REFILL (OUTPATIENT)
Dept: FAMILY MEDICINE | Facility: CLINIC | Age: 44
End: 2024-03-01
Payer: COMMERCIAL

## 2024-03-01 DIAGNOSIS — F32.1 CURRENT MODERATE EPISODE OF MAJOR DEPRESSIVE DISORDER WITHOUT PRIOR EPISODE (H): ICD-10-CM

## 2024-03-01 DIAGNOSIS — F41.0 PANIC ATTACK: ICD-10-CM

## 2024-03-01 DIAGNOSIS — F41.1 GAD (GENERALIZED ANXIETY DISORDER): ICD-10-CM

## 2024-03-01 RX ORDER — HYDROXYZINE HYDROCHLORIDE 25 MG/1
25 TABLET, FILM COATED ORAL EVERY 6 HOURS PRN
Qty: 60 TABLET | Refills: 0 | Status: SHIPPED | OUTPATIENT
Start: 2024-03-01 | End: 2024-03-26

## 2024-03-01 RX ORDER — ALPRAZOLAM 0.25 MG
0.25 TABLET ORAL 2 TIMES DAILY PRN
Qty: 20 TABLET | Refills: 0 | Status: SHIPPED | OUTPATIENT
Start: 2024-03-01

## 2024-03-18 ENCOUNTER — MYC REFILL (OUTPATIENT)
Dept: FAMILY MEDICINE | Facility: CLINIC | Age: 44
End: 2024-03-18
Payer: COMMERCIAL

## 2024-03-18 DIAGNOSIS — F41.0 PANIC ATTACK: ICD-10-CM

## 2024-03-18 RX ORDER — ALPRAZOLAM 0.25 MG
0.25 TABLET ORAL 2 TIMES DAILY PRN
Qty: 20 TABLET | Refills: 0 | OUTPATIENT
Start: 2024-03-18

## 2024-03-18 NOTE — TELEPHONE ENCOUNTER
Pending Prescriptions:                       Disp   Refills    ALPRAZolam (XANAX) 0.25 MG tablet          20 tab*0        Sig: Take 1 tablet (0.25 mg) by mouth 2 times daily as           needed for anxiety Must last until April           appointment    Routing refill request to provider for review/approval because:  Drug not on the [facility] refill protocol       Patient comment: Hi, could i get a half of a refill to last me to my appointment on the 4th. Got some big events coming up in the next couple of weeks and want to make sure i can attend. Thank you!        To be filled at: [facility] [address] [place] [address] - [facility] [address]        Ana Miranda RN  [facility]

## 2024-03-26 ENCOUNTER — MYC REFILL (OUTPATIENT)
Dept: FAMILY MEDICINE | Facility: CLINIC | Age: 44
End: 2024-03-26
Payer: COMMERCIAL

## 2024-03-26 DIAGNOSIS — F41.1 GAD (GENERALIZED ANXIETY DISORDER): ICD-10-CM

## 2024-03-27 RX ORDER — HYDROXYZINE HYDROCHLORIDE 25 MG/1
25 TABLET, FILM COATED ORAL EVERY 6 HOURS PRN
Qty: 60 TABLET | Refills: 0 | Status: SHIPPED | OUTPATIENT
Start: 2024-03-27 | End: 2024-04-24

## 2024-04-24 ENCOUNTER — MYC REFILL (OUTPATIENT)
Dept: FAMILY MEDICINE | Facility: CLINIC | Age: 44
End: 2024-04-24
Payer: COMMERCIAL

## 2024-04-24 DIAGNOSIS — F41.1 GAD (GENERALIZED ANXIETY DISORDER): ICD-10-CM

## 2024-04-25 RX ORDER — HYDROXYZINE HYDROCHLORIDE 25 MG/1
25 TABLET, FILM COATED ORAL EVERY 6 HOURS PRN
Qty: 60 TABLET | Refills: 2 | Status: SHIPPED | OUTPATIENT
Start: 2024-04-25 | End: 2024-08-14

## 2024-06-23 ENCOUNTER — HEALTH MAINTENANCE LETTER (OUTPATIENT)
Age: 44
End: 2024-06-23

## 2024-08-14 ENCOUNTER — MYC REFILL (OUTPATIENT)
Dept: FAMILY MEDICINE | Facility: CLINIC | Age: 44
End: 2024-08-14
Payer: COMMERCIAL

## 2024-08-14 DIAGNOSIS — F41.1 GAD (GENERALIZED ANXIETY DISORDER): ICD-10-CM

## 2024-08-15 RX ORDER — HYDROXYZINE HYDROCHLORIDE 25 MG/1
25 TABLET, FILM COATED ORAL EVERY 6 HOURS PRN
Qty: 60 TABLET | Refills: 1 | Status: SHIPPED | OUTPATIENT
Start: 2024-08-15

## 2024-08-15 RX ORDER — HYDROXYZINE HYDROCHLORIDE 25 MG/1
TABLET, FILM COATED ORAL
Qty: 60 TABLET | Refills: 0 | OUTPATIENT
Start: 2024-08-15

## 2024-11-13 ENCOUNTER — MYC REFILL (OUTPATIENT)
Dept: FAMILY MEDICINE | Facility: CLINIC | Age: 44
End: 2024-11-13
Payer: COMMERCIAL

## 2024-11-13 DIAGNOSIS — F41.1 GAD (GENERALIZED ANXIETY DISORDER): ICD-10-CM

## 2024-11-14 RX ORDER — HYDROXYZINE HYDROCHLORIDE 25 MG/1
25 TABLET, FILM COATED ORAL EVERY 6 HOURS PRN
Qty: 60 TABLET | Refills: 2 | Status: SHIPPED | OUTPATIENT
Start: 2024-11-14

## 2025-04-15 ENCOUNTER — VIRTUAL VISIT (OUTPATIENT)
Dept: FAMILY MEDICINE | Facility: CLINIC | Age: 45
End: 2025-04-15
Payer: COMMERCIAL

## 2025-04-15 DIAGNOSIS — F32.1 CURRENT MODERATE EPISODE OF MAJOR DEPRESSIVE DISORDER WITHOUT PRIOR EPISODE (H): ICD-10-CM

## 2025-04-15 DIAGNOSIS — F51.01 PRIMARY INSOMNIA: Primary | ICD-10-CM

## 2025-04-15 DIAGNOSIS — F41.1 GAD (GENERALIZED ANXIETY DISORDER): ICD-10-CM

## 2025-04-15 PROCEDURE — 98006 SYNCH AUDIO-VIDEO EST MOD 30: CPT | Performed by: NURSE PRACTITIONER

## 2025-04-15 RX ORDER — TRAZODONE HYDROCHLORIDE 50 MG/1
50 TABLET ORAL AT BEDTIME
Qty: 90 TABLET | Refills: 0 | Status: SHIPPED | OUTPATIENT
Start: 2025-04-15

## 2025-04-15 RX ORDER — HYDROXYZINE HYDROCHLORIDE 25 MG/1
25 TABLET, FILM COATED ORAL EVERY 6 HOURS PRN
Qty: 60 TABLET | Refills: 3 | Status: SHIPPED | OUTPATIENT
Start: 2025-04-15

## 2025-04-15 ASSESSMENT — ANXIETY QUESTIONNAIRES
7. FEELING AFRAID AS IF SOMETHING AWFUL MIGHT HAPPEN: SEVERAL DAYS
4. TROUBLE RELAXING: NOT AT ALL
GAD7 TOTAL SCORE: 6
6. BECOMING EASILY ANNOYED OR IRRITABLE: MORE THAN HALF THE DAYS
GAD7 TOTAL SCORE: 6
1. FEELING NERVOUS, ANXIOUS, OR ON EDGE: SEVERAL DAYS
3. WORRYING TOO MUCH ABOUT DIFFERENT THINGS: SEVERAL DAYS
IF YOU CHECKED OFF ANY PROBLEMS ON THIS QUESTIONNAIRE, HOW DIFFICULT HAVE THESE PROBLEMS MADE IT FOR YOU TO DO YOUR WORK, TAKE CARE OF THINGS AT HOME, OR GET ALONG WITH OTHER PEOPLE: SOMEWHAT DIFFICULT
GAD7 TOTAL SCORE: 6
2. NOT BEING ABLE TO STOP OR CONTROL WORRYING: SEVERAL DAYS
7. FEELING AFRAID AS IF SOMETHING AWFUL MIGHT HAPPEN: SEVERAL DAYS
8. IF YOU CHECKED OFF ANY PROBLEMS, HOW DIFFICULT HAVE THESE MADE IT FOR YOU TO DO YOUR WORK, TAKE CARE OF THINGS AT HOME, OR GET ALONG WITH OTHER PEOPLE?: SOMEWHAT DIFFICULT
5. BEING SO RESTLESS THAT IT IS HARD TO SIT STILL: NOT AT ALL

## 2025-04-15 ASSESSMENT — PATIENT HEALTH QUESTIONNAIRE - PHQ9
10. IF YOU CHECKED OFF ANY PROBLEMS, HOW DIFFICULT HAVE THESE PROBLEMS MADE IT FOR YOU TO DO YOUR WORK, TAKE CARE OF THINGS AT HOME, OR GET ALONG WITH OTHER PEOPLE: SOMEWHAT DIFFICULT
SUM OF ALL RESPONSES TO PHQ QUESTIONS 1-9: 6
SUM OF ALL RESPONSES TO PHQ QUESTIONS 1-9: 6

## 2025-04-15 NOTE — PROGRESS NOTES
"Ashish is a 44 year old who is being evaluated via a billable video visit.    How would you like to obtain your AVS? MyChart  If the video visit is dropped, the invitation should be resent by: Text to cell phone: [phone]  Will anyone else be joining your video visit? No  {If patient encounters technical issues they should call [phone] :551962}    {PROVIDER CHARTING PREFERENCE:006315}    Subjective   Ashish is a 44 year old, presenting for the following health issues:  Anxiety        4/15/2025     1:58 PM   Additional Questions   Roomed by [facility]     History of Present Illness       Mental Health Follow-up:  Patient presents to follow-up on Depression & Anxiety.Patient's depression since last visit has been:  Better  The patient is not having other symptoms associated with depression.  Patient's anxiety since last visit has been:  Better  The patient is not having other symptoms associated with anxiety.  Any significant life events: No  Patient is feeling anxious or having panic attacks.  Patient has no concerns about alcohol or drug use.    He eats 2-3 servings of fruits and vegetables daily.He consumes 0 sweetened beverage(s) daily.He exercises with enough effort to increase his heart rate 9 or less minutes per day.  He exercises with enough effort to increase his heart rate 3 or less days per week.   He is taking medications regularly.        {SUPERLIST (Optional):634328}  {additonal problems for provider to add (Optional):782395}    {ROS Picklists (Optional):871101}      Objective           Vitals:  No vitals were obtained today due to virtual visit.    Physical Exam   {video visit exam brief selected:165403}    {Diagnostic Test Results (Optional):892601}      Video-Visit Details    Type of service:  Video Visit   Originating Location (pt. Location): {video visit patient location:505756::\"Home\"}  {PROVIDER LOCATION On-site should be selected for visits conducted from your clinic location or adjoining [facility] " "hospital, academic office, or other nearby [facility] building. Off-site should be selected for all other provider locations, including home:113568}  Distant Location (provider location):  {virtual location provider:362331}  Platform used for Video Visit: {Virtual Visit Platforms:831641::\"mapp2link\"}  Signed Electronically by: MARTHA Almaraz CNP  {Email feedback regarding this note to [email or web address]   :821133}  "

## 2025-07-12 ENCOUNTER — HEALTH MAINTENANCE LETTER (OUTPATIENT)
Age: 45
End: 2025-07-12

## 2025-08-21 ENCOUNTER — MYC REFILL (OUTPATIENT)
Dept: FAMILY MEDICINE | Facility: CLINIC | Age: 45
End: 2025-08-21
Payer: COMMERCIAL

## 2025-08-21 DIAGNOSIS — F51.01 PRIMARY INSOMNIA: ICD-10-CM

## 2025-08-21 DIAGNOSIS — F41.0 PANIC ATTACK: ICD-10-CM

## 2025-08-23 RX ORDER — TRAZODONE HYDROCHLORIDE 50 MG/1
50 TABLET ORAL AT BEDTIME
Qty: 90 TABLET | Refills: 0 | Status: SHIPPED | OUTPATIENT
Start: 2025-08-23

## 2025-08-23 RX ORDER — ALPRAZOLAM 0.25 MG
0.25 TABLET ORAL 2 TIMES DAILY PRN
Qty: 10 TABLET | Refills: 0 | Status: SHIPPED | OUTPATIENT
Start: 2025-08-23